# Patient Record
Sex: FEMALE | Race: WHITE | NOT HISPANIC OR LATINO | Employment: OTHER | URBAN - NONMETROPOLITAN AREA
[De-identification: names, ages, dates, MRNs, and addresses within clinical notes are randomized per-mention and may not be internally consistent; named-entity substitution may affect disease eponyms.]

---

## 2017-02-22 ENCOUNTER — TRANSCRIBE ORDERS (OUTPATIENT)
Dept: MAMMOGRAPHY | Facility: HOSPITAL | Age: 77
End: 2017-02-22

## 2017-02-22 DIAGNOSIS — Z13.9 SCREENING: Primary | ICD-10-CM

## 2017-03-16 ENCOUNTER — HOSPITAL ENCOUNTER (OUTPATIENT)
Dept: MAMMOGRAPHY | Facility: HOSPITAL | Age: 77
Discharge: HOME | End: 2017-03-16
Admitting: NURSE PRACTITIONER

## 2017-03-16 DIAGNOSIS — Z13.9 SCREENING: ICD-10-CM

## 2017-03-16 PROCEDURE — 77063 BREAST TOMOSYNTHESIS BI: CPT

## 2017-03-16 PROCEDURE — G0202 SCR MAMMO BI INCL CAD: HCPCS

## 2017-04-07 ENCOUNTER — OFFICE VISIT (OUTPATIENT)
Dept: PRIMARY CARE CLINIC | Age: 77
End: 2017-04-07
Payer: MEDICARE

## 2017-04-07 VITALS
HEIGHT: 66 IN | OXYGEN SATURATION: 97 % | SYSTOLIC BLOOD PRESSURE: 120 MMHG | BODY MASS INDEX: 35.93 KG/M2 | WEIGHT: 223.6 LBS | TEMPERATURE: 98.3 F | RESPIRATION RATE: 20 BRPM | DIASTOLIC BLOOD PRESSURE: 62 MMHG | HEART RATE: 76 BPM

## 2017-04-07 DIAGNOSIS — J06.9 UPPER RESPIRATORY TRACT INFECTION, UNSPECIFIED TYPE: Primary | ICD-10-CM

## 2017-04-07 PROCEDURE — 1036F TOBACCO NON-USER: CPT | Performed by: FAMILY MEDICINE

## 2017-04-07 PROCEDURE — 1123F ACP DISCUSS/DSCN MKR DOCD: CPT | Performed by: FAMILY MEDICINE

## 2017-04-07 PROCEDURE — 4040F PNEUMOC VAC/ADMIN/RCVD: CPT | Performed by: FAMILY MEDICINE

## 2017-04-07 PROCEDURE — 99213 OFFICE O/P EST LOW 20 MIN: CPT | Performed by: FAMILY MEDICINE

## 2017-04-07 PROCEDURE — 1090F PRES/ABSN URINE INCON ASSESS: CPT | Performed by: FAMILY MEDICINE

## 2017-04-07 PROCEDURE — G8417 CALC BMI ABV UP PARAM F/U: HCPCS | Performed by: FAMILY MEDICINE

## 2017-04-07 PROCEDURE — G8427 DOCREV CUR MEDS BY ELIG CLIN: HCPCS | Performed by: FAMILY MEDICINE

## 2017-04-07 PROCEDURE — G8400 PT W/DXA NO RESULTS DOC: HCPCS | Performed by: FAMILY MEDICINE

## 2017-04-07 RX ORDER — AZITHROMYCIN 250 MG/1
TABLET, FILM COATED ORAL
Qty: 1 PACKET | Refills: 0 | Status: SHIPPED | OUTPATIENT
Start: 2017-04-07 | End: 2018-05-17 | Stop reason: ALTCHOICE

## 2017-04-07 RX ORDER — BENZONATATE 200 MG/1
200 CAPSULE ORAL 3 TIMES DAILY PRN
Qty: 30 CAPSULE | Refills: 0 | Status: SHIPPED | OUTPATIENT
Start: 2017-04-07 | End: 2017-04-14

## 2017-04-07 ASSESSMENT — ENCOUNTER SYMPTOMS
CONSTIPATION: 0
SINUS PRESSURE: 1
VOMITING: 0
SHORTNESS OF BREATH: 0
EYE REDNESS: 0
EYE ITCHING: 0
DIARRHEA: 0
EYE PAIN: 1
WHEEZING: 0
COUGH: 1
SORE THROAT: 1
RHINORRHEA: 1
EYE DISCHARGE: 0
NAUSEA: 0
ABDOMINAL PAIN: 0

## 2017-05-04 ENCOUNTER — HOSPITAL ENCOUNTER (OUTPATIENT)
Dept: GENERAL RADIOLOGY | Age: 77
Discharge: HOME | End: 2017-05-04
Attending: FAMILY MEDICINE | Admitting: FAMILY MEDICINE

## 2017-05-04 DIAGNOSIS — R19.00 PELVIC MASS IN FEMALE: ICD-10-CM

## 2018-02-05 ENCOUNTER — TRANSCRIBE ORDERS (OUTPATIENT)
Dept: MAMMOGRAPHY | Facility: HOSPITAL | Age: 78
End: 2018-02-05

## 2018-02-05 DIAGNOSIS — Z12.39 BREAST CANCER SCREENING: Primary | ICD-10-CM

## 2018-02-14 RX ORDER — INSULIN GLARGINE 100 [IU]/ML
INJECTION, SOLUTION SUBCUTANEOUS
Status: ON HOLD | COMMUNITY
End: 2018-06-11

## 2018-02-14 RX ORDER — CARVEDILOL 25 MG/1
TABLET ORAL
COMMUNITY

## 2018-02-14 RX ORDER — MONTELUKAST SODIUM 10 MG/1
TABLET ORAL
COMMUNITY

## 2018-02-14 RX ORDER — AMLODIPINE BESYLATE 5 MG/1
5 TABLET ORAL DAILY
COMMUNITY

## 2018-02-14 RX ORDER — OXYCODONE AND ACETAMINOPHEN 10; 325 MG/1; MG/1
1 TABLET ORAL EVERY 6 HOURS PRN
COMMUNITY

## 2018-02-14 RX ORDER — LOSARTAN POTASSIUM 100 MG/1
TABLET ORAL
COMMUNITY

## 2018-02-14 RX ORDER — DOCUSATE SODIUM 100 MG/1
100 CAPSULE, LIQUID FILLED ORAL 2 TIMES DAILY
Status: ON HOLD | COMMUNITY
End: 2018-06-11

## 2018-02-14 RX ORDER — ROPINIROLE 2 MG/1
TABLET, FILM COATED ORAL
COMMUNITY

## 2018-02-14 RX ORDER — FUROSEMIDE 20 MG/1
20 TABLET ORAL 2 TIMES DAILY
Status: ON HOLD | COMMUNITY
End: 2018-06-11

## 2018-02-14 RX ORDER — PHENOL 1.4 %
600 AEROSOL, SPRAY (ML) MUCOUS MEMBRANE DAILY
COMMUNITY

## 2018-02-14 RX ORDER — LEVOTHYROXINE SODIUM 0.07 MG/1
TABLET ORAL
COMMUNITY

## 2018-02-14 RX ORDER — ASPIRIN 81 MG/1
TABLET, CHEWABLE ORAL
COMMUNITY

## 2018-02-15 ENCOUNTER — OFFICE VISIT (OUTPATIENT)
Dept: SURGERY | Facility: CLINIC | Age: 78
End: 2018-02-15

## 2018-02-15 VITALS
WEIGHT: 220 LBS | OXYGEN SATURATION: 98 % | HEIGHT: 64 IN | SYSTOLIC BLOOD PRESSURE: 120 MMHG | BODY MASS INDEX: 37.56 KG/M2 | DIASTOLIC BLOOD PRESSURE: 50 MMHG | TEMPERATURE: 98.1 F | HEART RATE: 67 BPM

## 2018-02-15 DIAGNOSIS — K59.04 CHRONIC IDIOPATHIC CONSTIPATION: Primary | ICD-10-CM

## 2018-02-15 DIAGNOSIS — Z12.11 SCREENING FOR COLON CANCER: ICD-10-CM

## 2018-02-15 DIAGNOSIS — N18.2 ANEMIA IN STAGE 2 CHRONIC KIDNEY DISEASE: ICD-10-CM

## 2018-02-15 DIAGNOSIS — D63.1 ANEMIA IN STAGE 2 CHRONIC KIDNEY DISEASE: ICD-10-CM

## 2018-02-15 PROCEDURE — 99203 OFFICE O/P NEW LOW 30 MIN: CPT | Performed by: SURGERY

## 2018-02-15 NOTE — PROGRESS NOTES
Patient: Dai Murphy    YOB: 1940    Date: 02/15/2018    Primary Care Provider: Buck Stern MD    Chief complaint:   Chief Complaint   Patient presents with   • Constipation     Chronic constipation        Subjective .     History of present illness:  I saw the patient in the office today as a consultation for evaluation and treatment of constipation and right flank pain. Constipation has been intermittent for the last several years.  Patient states she does have to take a laxative to have a bowel movement.  Patient denies any black or bloody bowel movements.  She also complains of RUQ pain and nausea after eating fatty/greasy food.  This is mild and only intermittent.  She no longer has her gallbladder.  Last normal colonoscopy was done 7 years ago at [facility] by myself. Patient denies a family history of colon cancer.      The following portions of the patient's history were reviewed and updated as appropriate: allergies, current medications, past family history, past medical history, past social history, past surgical history and problem list.      Review of Systems   Constitutional: Negative for chills, fever and unexpected weight change.   HENT: Negative for hearing loss, trouble swallowing and voice change.    Eyes: Negative for visual disturbance.   Respiratory: Positive for cough and shortness of breath. Negative for apnea, chest tightness and wheezing.    Cardiovascular: Negative for chest pain, palpitations and leg swelling.   Gastrointestinal: Positive for abdominal pain, constipation and nausea. Negative for abdominal distention, anal bleeding, blood in stool, diarrhea, rectal pain and vomiting.   Endocrine: Negative for cold intolerance and heat intolerance.   Genitourinary: Positive for flank pain. Negative for difficulty urinating and dysuria.   Musculoskeletal: Negative for back pain and gait problem.   Skin: Negative for color change, rash and wound.   Neurological:  Negative for dizziness, syncope, speech difficulty, weakness, light-headedness, numbness and headaches.   Hematological: Negative for adenopathy. Does not bruise/bleed easily.   Psychiatric/Behavioral: Negative for confusion. The patient is not nervous/anxious.        History:  Past Medical History:   Diagnosis Date   • CAD (coronary artery disease)    • CHF (congestive heart failure)    • Chronic kidney disease    • Diabetes mellitus    • GERD (gastroesophageal reflux disease)    • Hyperlipidemia    • Hypertension        Past Surgical History:   Procedure Laterality Date   • CHOLECYSTECTOMY     • OOPHORECTOMY         Family History   Problem Relation Age of Onset   • Heart disease Mother    • No Known Problems Father        Social History   Substance Use Topics   • Smoking status: Former Smoker     Quit date: 2/15/1968   • Smokeless tobacco: Never Used   • Alcohol use No       Allergies:  No Known Allergies    Medications:    Current Outpatient Prescriptions:   •  amLODIPine (NORVASC) 5 MG tablet, Take 5 mg by mouth Daily., Disp: , Rfl:   •  aspirin 81 MG chewable tablet, Take 81 mg by mouth daily., Disp: , Rfl:   •  calcium carbonate (OS-TONYA) 600 MG tablet, Take 600 mg by mouth Daily., Disp: , Rfl:   •  carvedilol (COREG) 25 MG tablet, Take 25 mg by mouth 2 times daily (with meals)., Disp: , Rfl:   •  docusate sodium (COLACE) 100 MG capsule, Take 100 mg by mouth 2 (Two) Times a Day., Disp: , Rfl:   •  fluticasone (VERAMYST) 27.5 MCG/SPRAY nasal spray, 2 sprays into each nostril Daily., Disp: , Rfl:   •  furosemide (LASIX) 20 MG tablet, Take 20 mg by mouth 2 (Two) Times a Day., Disp: , Rfl:   •  insulin glargine (LANTUS) 100 UNIT/ML injection, Inject 50 Units into the skin daily., Disp: , Rfl:   •  insulin lispro (humaLOG) 100 UNIT/ML injection, Inject  under the skin 3 (Three) Times a Day Before Meals., Disp: , Rfl:   •  levothyroxine (SYNTHROID, LEVOTHROID) 75 MCG tablet, Take 75 mcg by mouth Daily., Disp: , Rfl:    •  losartan (COZAAR) 100 MG tablet, Take 100 mg by mouth daily., Disp: , Rfl:   •  metFORMIN (GLUCOPHAGE) 500 MG tablet, Take 500 mg by mouth 2 times daily (with meals)., Disp: , Rfl:   •  montelukast (SINGULAIR) 10 MG tablet, Take 10 mg by mouth nightly., Disp: , Rfl:   •  oxyCODONE-acetaminophen (PERCOCET)  MG per tablet, Take 1 tablet by mouth Every 6 (Six) Hours As Needed for Moderate Pain ., Disp: , Rfl:   •  rOPINIRole (REQUIP) 2 MG tablet, Take 2 mg by mouth 2 times daily., Disp: , Rfl:     Objective     Vital Signs:   Temp:  [98.1 °F (36.7 °C)] 98.1 °F (36.7 °C)  Heart Rate:  [67] 67  BP: (120)/(50) 120/50    Physical Exam:   General Appearance:    Alert, cooperative, in no acute distress   Head:    Normocephalic, without obvious abnormality, atraumatic   Eyes:            Lids and lashes normal, conjunctivae and sclerae normal, no   icterus, no pallor, corneas clear, PERRL   Ears:    Ears appear intact with no abnormalities noted   Lungs:     Clear to auscultation,respirations regular, even and                  unlabored    Heart:    Regular rhythm and normal rate, normal S1 and S2, no            murmur   Abdomen:     no masses, no organomegaly, soft non-tender, non-distended, no guarding, there is no evidence of tenderness   Extremities:   Moves all extremities well, no edema, no cyanosis, no             redness   Pulses:   Pulses palpable and equal bilaterally   Skin:   No bleeding, bruising or rash   Neurologic:   Cranial nerves 2 - 12 grossly intact, sensation intact   Psychiatric: No evidence of depression or anxiety      Results Review:   I reviewed the patient's new clinical results.  Labs were reviewed    Review of Systems was reviewed and confirmed as accurate today.    Assessment/Plan :    1. Chronic idiopathic constipation : Stool softener seems to help.  Fiber supplement of choice daily might be of benefit as well.     2. Screening for colon cancer :Patient had a colonoscopy which  revealed diverticulosis 7 years ago.  She is not a candidate for screening at this time since she had no polyps and no polyp history.  No family history of colon cancer.      3. Anemia: Likely secondary to chronic disease and/or mild renal disease.  Does not appear to be microcytic.  I'll check an iron level.  I'll also check a stool guaiac.  If her iron is low or stool guaiac is positive I'll recommend a colonoscopy.    If there is an indication for colonoscopy such as iron deficiency or blood in her stool we'll proceed with a colonoscopy.  She understands this procedure as well as the risk of bleeding and perforation.    Electronically signed by Robert Foy MD  02/15/18 2:49 PM  Scribed for Robert Foy MD by Shweta Ornelas. 2/15/2018  3:45 PM

## 2018-02-20 ENCOUNTER — RESULTS ENCOUNTER (OUTPATIENT)
Dept: SURGERY | Facility: CLINIC | Age: 78
End: 2018-02-20

## 2018-02-20 DIAGNOSIS — D63.1 ANEMIA IN STAGE 2 CHRONIC KIDNEY DISEASE: ICD-10-CM

## 2018-02-20 DIAGNOSIS — N18.2 ANEMIA IN STAGE 2 CHRONIC KIDNEY DISEASE: ICD-10-CM

## 2018-03-22 ENCOUNTER — HOSPITAL ENCOUNTER (OUTPATIENT)
Dept: MAMMOGRAPHY | Facility: HOSPITAL | Age: 78
Discharge: HOME | End: 2018-03-22
Admitting: FAMILY MEDICINE

## 2018-03-22 DIAGNOSIS — Z12.39 BREAST CANCER SCREENING: ICD-10-CM

## 2018-03-22 PROCEDURE — 77063 BREAST TOMOSYNTHESIS BI: CPT

## 2018-03-22 PROCEDURE — 77067 SCR MAMMO BI INCL CAD: CPT

## 2018-03-23 ENCOUNTER — TRANSCRIBE ORDERS (OUTPATIENT)
Dept: ADMINISTRATIVE | Facility: HOSPITAL | Age: 78
End: 2018-03-23

## 2018-03-23 DIAGNOSIS — M54.41 ACUTE BACK PAIN WITH SCIATICA, RIGHT: Primary | ICD-10-CM

## 2018-03-26 ENCOUNTER — HOSPITAL ENCOUNTER (OUTPATIENT)
Dept: OTHER | Age: 78
Discharge: HOME | End: 2018-03-26

## 2018-03-26 LAB
A/G RATIO: 1.5 (ref 0.8–2)
ALBUMIN SERPL-MCNC: 4.2 G/DL (ref 3.4–4.8)
ALP BLD-CCNC: 101 U/L (ref 25–100)
ALT SERPL-CCNC: 16 U/L (ref 4–36)
ANION GAP SERPL CALCULATED.3IONS-SCNC: 13 MMOL/L (ref 3–16)
AST SERPL-CCNC: 15 U/L (ref 8–33)
BASOPHILS ABSOLUTE: 0 K/UL (ref 0–0.1)
BASOPHILS RELATIVE PERCENT: 0.4 %
BILIRUB SERPL-MCNC: 0.7 MG/DL (ref 0.3–1.2)
BUN BLDV-MCNC: 36 MG/DL (ref 6–20)
CALCIUM SERPL-MCNC: 9.5 MG/DL (ref 8.5–10.5)
CHLORIDE BLD-SCNC: 97 MMOL/L (ref 98–107)
CO2: 27 MMOL/L (ref 20–30)
CREAT SERPL-MCNC: 1.1 MG/DL (ref 0.4–1.2)
EOSINOPHILS ABSOLUTE: 0.1 K/UL (ref 0–0.4)
EOSINOPHILS RELATIVE PERCENT: 1.7 %
GFR AFRICAN AMERICAN: 58
GFR NON-AFRICAN AMERICAN: 48
GLOBULIN: 2.8 G/DL
GLUCOSE BLD-MCNC: 245 MG/DL (ref 74–106)
HCT VFR BLD CALC: 35.5 % (ref 37–47)
HEMOGLOBIN: 11.6 G/DL (ref 11.5–16.5)
IMMATURE GRANULOCYTES #: 0 K/UL
IMMATURE GRANULOCYTES %: 0.2 % (ref 0–5)
LYMPHOCYTES ABSOLUTE: 2 K/UL (ref 1.5–4)
LYMPHOCYTES RELATIVE PERCENT: 23.7 %
MCH RBC QN AUTO: 30.7 PG (ref 27–32)
MCHC RBC AUTO-ENTMCNC: 32.7 G/DL (ref 31–35)
MCV RBC AUTO: 93.9 FL (ref 80–100)
MONOCYTES ABSOLUTE: 0.6 K/UL (ref 0.2–0.8)
MONOCYTES RELATIVE PERCENT: 6.7 %
NEUTROPHILS ABSOLUTE: 5.7 K/UL (ref 2–7.5)
NEUTROPHILS RELATIVE PERCENT: 67.3 %
PDW BLD-RTO: 12.3 % (ref 11–16)
PLATELET # BLD: 232 K/UL (ref 150–400)
PMV BLD AUTO: 9.6 FL (ref 6–10)
POTASSIUM SERPL-SCNC: 5.1 MMOL/L (ref 3.4–5.1)
RBC # BLD: 3.78 M/UL (ref 3.8–5.8)
RHEUMATOID FACTOR: <10 IU/ML
SODIUM BLD-SCNC: 137 MMOL/L (ref 136–145)
TOTAL PROTEIN: 7 G/DL (ref 6.4–8.3)
WBC # BLD: 8.4 K/UL (ref 4–11)

## 2018-03-27 LAB
ANA INTERPRETATION: NORMAL
ANTI-NUCLEAR ANTIBODY (ANA): NEGATIVE
KAPPA, FREE LIGHT CHAINS, SERUM: 69.65 MG/L (ref 3.3–19.4)
KAPPA/LAMBDA RATIO: 2.59 (ref 0.26–1.65)
KAPPA/LAMBDA TEST COMMENT: ABNORMAL
LAMBDA, FREE LIGHT CHAINS, SERUM: 26.92 MG/L (ref 5.71–26.3)

## 2018-03-29 ENCOUNTER — HOSPITAL ENCOUNTER (OUTPATIENT)
Dept: GENERAL RADIOLOGY | Facility: HOSPITAL | Age: 78
Discharge: HOME | End: 2018-03-29

## 2018-03-29 ENCOUNTER — TRANSCRIBE ORDERS (OUTPATIENT)
Dept: GENERAL RADIOLOGY | Facility: HOSPITAL | Age: 78
End: 2018-03-29

## 2018-03-29 ENCOUNTER — HOSPITAL ENCOUNTER (OUTPATIENT)
Dept: MRI IMAGING | Facility: HOSPITAL | Age: 78
Discharge: HOME | End: 2018-03-29
Admitting: FAMILY MEDICINE

## 2018-03-29 DIAGNOSIS — R52 PAIN: ICD-10-CM

## 2018-03-29 DIAGNOSIS — R52 PAIN: Primary | ICD-10-CM

## 2018-03-29 DIAGNOSIS — M54.41 ACUTE BACK PAIN WITH SCIATICA, RIGHT: ICD-10-CM

## 2018-03-29 LAB — MISCELLANEOUS LAB TEST ORDER: ABNORMAL

## 2018-03-29 PROCEDURE — 72148 MRI LUMBAR SPINE W/O DYE: CPT

## 2018-03-29 PROCEDURE — 73130 X-RAY EXAM OF HAND: CPT

## 2018-03-29 PROCEDURE — 73610 X-RAY EXAM OF ANKLE: CPT

## 2018-03-29 PROCEDURE — 72100 X-RAY EXAM L-S SPINE 2/3 VWS: CPT

## 2018-03-29 PROCEDURE — 73562 X-RAY EXAM OF KNEE 3: CPT

## 2018-04-04 ENCOUNTER — TELEPHONE (OUTPATIENT)
Dept: SURGERY | Facility: CLINIC | Age: 78
End: 2018-04-04

## 2018-04-04 NOTE — TELEPHONE ENCOUNTER
I called the patient to see if they were going to have labs done, however there was answer so I left a message for her to call back.

## 2018-04-26 ENCOUNTER — HOSPITAL ENCOUNTER (OUTPATIENT)
Dept: PHYSICAL THERAPY | Age: 78
Discharge: HOME | End: 2018-04-30
Attending: ORTHOPAEDIC SURGERY | Admitting: ORTHOPAEDIC SURGERY

## 2018-05-01 ENCOUNTER — HOSPITAL ENCOUNTER (OUTPATIENT)
Dept: OTHER | Age: 78
Discharge: HOME | End: 2018-05-31
Attending: ORTHOPAEDIC SURGERY | Admitting: ORTHOPAEDIC SURGERY

## 2018-05-08 ENCOUNTER — TELEPHONE (OUTPATIENT)
Dept: SURGERY | Facility: CLINIC | Age: 78
End: 2018-05-08

## 2018-05-08 NOTE — TELEPHONE ENCOUNTER
Please let patient no that I ordered that labwork to further characterize her anemia make sure it was sent because her iron is low.  If her iron is low she would need a colonoscopy if not then we can avoid the colonoscopy.  I do recommend getting those labs.  She doesn't have to do I am at the hospital.  She can do them at [facility] if she wants to.

## 2018-05-17 ENCOUNTER — TRANSCRIBE ORDERS (OUTPATIENT)
Dept: ADMINISTRATIVE | Facility: HOSPITAL | Age: 78
End: 2018-05-17

## 2018-05-17 DIAGNOSIS — R42 DIZZINESS AND GIDDINESS: Primary | ICD-10-CM

## 2018-05-22 ENCOUNTER — HOSPITAL ENCOUNTER (OUTPATIENT)
Dept: ULTRASOUND IMAGING | Facility: HOSPITAL | Age: 78
Discharge: HOME | End: 2018-05-22
Admitting: NURSE PRACTITIONER

## 2018-05-22 DIAGNOSIS — R42 DIZZINESS AND GIDDINESS: ICD-10-CM

## 2018-05-22 PROCEDURE — 93880 EXTRACRANIAL BILAT STUDY: CPT

## 2018-05-23 NOTE — TELEPHONE ENCOUNTER
Letter given to Jessica so that it will be sent certified mail since we are unable to speak to the patient on the phone.

## 2018-06-07 ENCOUNTER — OFFICE VISIT (OUTPATIENT)
Dept: SURGERY | Facility: CLINIC | Age: 78
End: 2018-06-07

## 2018-06-07 VITALS
HEIGHT: 64 IN | HEART RATE: 69 BPM | TEMPERATURE: 97.9 F | OXYGEN SATURATION: 98 % | BODY MASS INDEX: 36.91 KG/M2 | WEIGHT: 216.2 LBS

## 2018-06-07 DIAGNOSIS — I65.23 CAROTID STENOSIS, ASYMPTOMATIC, BILATERAL: Primary | ICD-10-CM

## 2018-06-07 PROCEDURE — 99214 OFFICE O/P EST MOD 30 MIN: CPT | Performed by: SURGERY

## 2018-06-07 NOTE — PROGRESS NOTES
Patient: Dai Murphy    YOB: 1940    Date: 06/07/2018    Primary Care Provider: Buck Stern MD    Chief Complaint   Patient presents with   • Carotid Artery Disease       Subjective .     History of present illness:  The patient is in the office today for evaluation for carotid endarterectomy.  She complains of dizziness and tingling in her feet.  She denies any pain.  She had an ultrasound that shows greater than 70% on the right side and less than 50% on the left side. Patient denies TIA or stroke symptoms. No chest pain or shortness of breath. She was seen by cardiology and had stable findings and cleared for surgery. No family history of TIA or strokes.    The following portions of the patient's history were reviewed and updated as appropriate: allergies, current medications, past family history, past medical history, past social history, past surgical history and problem list.      Review of Systems   Constitutional: Negative for chills, fever and unexpected weight change.   HENT: Negative for hearing loss, trouble swallowing and voice change.    Eyes: Negative for visual disturbance.   Respiratory: Negative for apnea, cough, chest tightness, shortness of breath and wheezing.    Cardiovascular: Negative for chest pain, palpitations and leg swelling.   Gastrointestinal: Negative for abdominal distention, abdominal pain, anal bleeding, blood in stool, constipation, diarrhea, nausea, rectal pain and vomiting.   Endocrine: Negative for cold intolerance and heat intolerance.   Genitourinary: Negative for difficulty urinating, dysuria and flank pain.   Musculoskeletal: Negative for back pain and gait problem.   Skin: Negative for color change, rash and wound.   Neurological: Positive for dizziness, light-headedness and numbness. Negative for syncope, speech difficulty, weakness and headaches.   Hematological: Negative for adenopathy. Does not bruise/bleed easily.   Psychiatric/Behavioral:  "Negative for confusion. The patient is not nervous/anxious.        Allergies:  No Known Allergies    Medications:    Current Outpatient Prescriptions:   •  amLODIPine (NORVASC) 5 MG tablet, Take 5 mg by mouth Daily., Disp: , Rfl:   •  aspirin 81 MG chewable tablet, Take 81 mg by mouth daily., Disp: , Rfl:   •  calcium carbonate (OS-TONYA) 600 MG tablet, Take 600 mg by mouth Daily., Disp: , Rfl:   •  carvedilol (COREG) 25 MG tablet, Take 25 mg by mouth 2 times daily (with meals)., Disp: , Rfl:   •  docusate sodium (COLACE) 100 MG capsule, Take 100 mg by mouth 2 (Two) Times a Day., Disp: , Rfl:   •  fluticasone (VERAMYST) 27.5 MCG/SPRAY nasal spray, 2 sprays into each nostril Daily., Disp: , Rfl:   •  furosemide (LASIX) 20 MG tablet, Take 20 mg by mouth 2 (Two) Times a Day., Disp: , Rfl:   •  insulin glargine (LANTUS) 100 UNIT/ML injection, Inject 50 Units into the skin daily., Disp: , Rfl:   •  insulin lispro (humaLOG) 100 UNIT/ML injection, Inject  under the skin 3 (Three) Times a Day Before Meals., Disp: , Rfl:   •  levothyroxine (SYNTHROID, LEVOTHROID) 75 MCG tablet, Take 75 mcg by mouth Daily., Disp: , Rfl:   •  losartan (COZAAR) 100 MG tablet, Take 100 mg by mouth daily., Disp: , Rfl:   •  metFORMIN (GLUCOPHAGE) 500 MG tablet, Take 500 mg by mouth 2 times daily (with meals)., Disp: , Rfl:   •  montelukast (SINGULAIR) 10 MG tablet, Take 10 mg by mouth nightly., Disp: , Rfl:   •  oxyCODONE-acetaminophen (PERCOCET)  MG per tablet, Take 1 tablet by mouth Every 6 (Six) Hours As Needed for Moderate Pain ., Disp: , Rfl:   •  rOPINIRole (REQUIP) 2 MG tablet, Take 2 mg by mouth 2 times daily., Disp: , Rfl:     History\"  Past Medical History:   Diagnosis Date   • CAD (coronary artery disease)    • CHF (congestive heart failure)    • Chronic kidney disease    • Diabetes mellitus    • GERD (gastroesophageal reflux disease)    • Hyperlipidemia    • Hypertension        Past Surgical History:   Procedure Laterality Date " "  • CHOLECYSTECTOMY     • OOPHORECTOMY         Family History   Problem Relation Age of Onset   • Heart disease Mother    • No Known Problems Father        Social History   Substance Use Topics   • Smoking status: Former Smoker     Quit date: 2/15/1968   • Smokeless tobacco: Never Used   • Alcohol use No        Objective     Vital Signs:   Vitals:    06/07/18 1032   Pulse: 69   Temp: 97.9 °F (36.6 °C)   TempSrc: Temporal Artery    SpO2: 98%   Weight: 98.1 kg (216 lb 3.2 oz)   Height: 162.6 cm (64.02\")       Physical Exam:   General Appearance:    Alert, cooperative, in no acute distress   Head:    Normocephalic, without obvious abnormality, atraumatic   Eyes:            Lids and lashes normal, conjunctivae and sclerae normal, no   icterus, no pallor, corneas clear, PERRLA   Ears:    Ears appear intact with no abnormalities noted   Throat:   No oral lesions, no thrush, oral mucosa moist   Neck:   No adenopathy, supple, trachea midline, no thyromegaly, Bilateral  carotid bruits, no JVD   Lungs:     Clear to auscultation,respirations regular, even and                  unlabored    Heart:    Regular rhythm and normal rate, normal S1 and S2, no            murmur, no gallop, no rub, no click   Chest Wall:    No abnormalities observed   Abdomen:     Normal bowel sounds, no masses, no organomegaly, soft        non-tender, non-distended, no guarding, no rebound                tenderness   Extremities:   Moves all extremities well, no edema, no cyanosis, no             redness   Pulses:   Pulses palpable and equal bilaterally   Skin:   No bleeding, bruising or rash   Lymph nodes:   No palpable adenopathy   Neurologic:   Cranial nerves 2 - 12 grossly intact, sensation intact, DTR       present and equal bilaterally     Results Review:   I reviewed the patient's new clinical results.    Assessment/Plan     1. Carotid stenosis, asymptomatic, bilateral        Discussion with patient, recommend right carotid endarterectomy for " high-grade stenosis. Risks of bleeding, infection and 2% chance of perioperative CVA were discussed and patient agreeable. She also understands she'll be admitted in ICU for overnight observation. Patient understands and is agreeable, no further questions per patient.    I discussed the patients findings and my recommendations with patient    Review of Systems was reviewed and confirmed as accurate today.    Electronically signed by Nate Matthew MD  06/07/18      Portions of this note have been scribed for Nate Matthew MD by Michelle Milligan. 6/7/2018  10:49 AM

## 2018-06-08 ENCOUNTER — TRANSCRIBE ORDERS (OUTPATIENT)
Dept: ADMINISTRATIVE | Facility: HOSPITAL | Age: 78
End: 2018-06-08

## 2018-06-08 ENCOUNTER — HOSPITAL ENCOUNTER (OUTPATIENT)
Dept: GENERAL RADIOLOGY | Facility: HOSPITAL | Age: 78
Discharge: HOME | End: 2018-06-08
Admitting: SURGERY

## 2018-06-08 ENCOUNTER — APPOINTMENT (OUTPATIENT)
Dept: PREADMISSION TESTING | Facility: HOSPITAL | Age: 78
End: 2018-06-08

## 2018-06-08 VITALS — BODY MASS INDEX: 36.88 KG/M2 | WEIGHT: 216 LBS | HEIGHT: 64 IN

## 2018-06-08 DIAGNOSIS — I25.10 CAD IN NATIVE ARTERY: Primary | ICD-10-CM

## 2018-06-08 PROBLEM — I65.23 CAROTID STENOSIS, ASYMPTOMATIC, BILATERAL: Status: ACTIVE | Noted: 2018-06-08

## 2018-06-08 LAB
ANION GAP SERPL CALCULATED.3IONS-SCNC: 16 MMOL/L (ref 10–20)
BUN BLD-MCNC: 31 MG/DL (ref 7–20)
BUN/CREAT SERPL: 31 (ref 7.1–23.5)
CALCIUM SPEC-SCNC: 9.3 MG/DL (ref 8.4–10.2)
CHLORIDE SERPL-SCNC: 102 MMOL/L (ref 98–107)
CO2 SERPL-SCNC: 28 MMOL/L (ref 26–30)
CREAT BLD-MCNC: 1 MG/DL (ref 0.6–1.3)
DEPRECATED RDW RBC AUTO: 42.8 FL (ref 37–54)
ERYTHROCYTE [DISTWIDTH] IN BLOOD BY AUTOMATED COUNT: 12.6 % (ref 11.5–14.5)
GFR SERPL CREATININE-BSD FRML MDRD: 54 ML/MIN/1.73
GLUCOSE BLD-MCNC: 271 MG/DL (ref 74–98)
HCT VFR BLD AUTO: 32.9 % (ref 37–47)
HGB BLD-MCNC: 11 G/DL (ref 12–16)
MCH RBC QN AUTO: 31.3 PG (ref 27–31)
MCHC RBC AUTO-ENTMCNC: 33.4 G/DL (ref 30–37)
MCV RBC AUTO: 93.5 FL (ref 81–99)
PLATELET # BLD AUTO: 245 10*3/MM3 (ref 130–400)
PMV BLD AUTO: 10.2 FL (ref 6–12)
POTASSIUM BLD-SCNC: 5 MMOL/L (ref 3.5–5.1)
RBC # BLD AUTO: 3.52 10*6/MM3 (ref 4.2–5.4)
SODIUM BLD-SCNC: 141 MMOL/L (ref 137–145)
WBC NRBC COR # BLD: 7.56 10*3/MM3 (ref 4.8–10.8)

## 2018-06-08 PROCEDURE — 80048 BASIC METABOLIC PNL TOTAL CA: CPT | Performed by: SURGERY

## 2018-06-08 PROCEDURE — 71045 X-RAY EXAM CHEST 1 VIEW: CPT

## 2018-06-08 PROCEDURE — 85027 COMPLETE CBC AUTOMATED: CPT | Performed by: SURGERY

## 2018-06-08 PROCEDURE — 36415 COLL VENOUS BLD VENIPUNCTURE: CPT

## 2018-06-08 NOTE — DISCHARGE INSTRUCTIONS
Patient instructed on PAT PASS information.  Patient/family member verbalized understanding of instruction/education.Patient to apply Chlorhexadine wipes  to surgical area (as instructed) the night before procedure and the AM of procedure. Wipes provided.Chlorhexidine wipes given to patient with verification sheet. Patient/Family member verbalized understanding to all teaching and instruction.PAT PASS GIVEN/REVIEWED WITH PT.  VERBALIZED UNDERSTANDING OF THE FOLLOWING:  DO NOT EAT, DRINK, SMOKE, USE SMOKELESS TOBACCO OR CHEW GUM AFTER MIDNIGHT THE NIGHT BEFORE SURGERY.  THIS ALSO INCLUDES HARD CANDIES AND MINTS.    DO NOT SHAVE THE AREA TO BE OPERATED ON AT LEAST 48 HOURS PRIOR TO THE PROCEDURE.  DO NOT WEAR MAKE UP OR NAIL POLISH.  DO NOT LEAVE IN ANY PIERCING OR WEAR JEWELRY THE DAY OF SURGERY.      DO NOT USE ADHESIVES IF YOU WEAR DENTURES.    DO NOT WEAR EYE CONTACTS; BRING IN YOUR GLASSES.    ONLY TAKE MEDICATION THE MORNING OF YOUR PROCEDURE IF INSTRUCTED BY YOUR SURGEON WITH ENOUGH WATER TO SWALLOW THE MEDICATION.  IF YOUR SURGEON DID NOT SPECIFY WHICH MEDICATIONS TO TAKE, YOU WILL NEED TO CALL THEIR OFFICE FOR FURTHER INSTRUCTIONS AND DO AS THEY INSTRUCT.    LEAVE ANYTHING YOU CONSIDER VALUABLE AT HOME.    YOU WILL NEED TO ARRANGE FOR SOMEONE TO DRIVE YOU HOME AFTER SURGERY.  IT IS RECOMMENDED THAT YOU DO NOT DRIVE, WORK, DRINK ALCOHOL OR MAKE MAJOR DECISIONS FOR AT LEAST 24 HOURS AFTER YOUR PROCEDURE IS COMPLETE.      THE DAY OF YOUR PROCEDURE, BRING IN THE FOLLOWING IF APPLICABLE:   PICTURE ID AND INSURANCE/MEDICARE OR MEDICAID CARDS   COPY OF ADVANCED DIRECTIVE/LIVING WILL/POWER OR    CPAP/BIPAP/INHALERS   SKIN PREP SHEET   YOUR PREADMISSION TESTING PASS (IF NOT A PHONE HISTORY)

## 2018-06-11 ENCOUNTER — HOSPITAL ENCOUNTER (OUTPATIENT)
Facility: HOSPITAL | Age: 78
Discharge: HOME | End: 2018-06-12
Attending: INTERNAL MEDICINE | Admitting: INTERNAL MEDICINE

## 2018-06-11 DIAGNOSIS — I25.10 CAD IN NATIVE ARTERY: Primary | ICD-10-CM

## 2018-06-11 LAB
ALBUMIN SERPL-MCNC: 4.2 G/DL (ref 3.5–5)
ALBUMIN/GLOB SERPL: 1.4 G/DL (ref 1–2)
ALP SERPL-CCNC: 115 U/L (ref 38–126)
ALT SERPL W P-5'-P-CCNC: 25 U/L (ref 13–69)
ANION GAP SERPL CALCULATED.3IONS-SCNC: 14.5 MMOL/L (ref 10–20)
AST SERPL-CCNC: 22 U/L (ref 15–46)
BILIRUB SERPL-MCNC: 0.8 MG/DL (ref 0.2–1.3)
BUN BLD-MCNC: 29 MG/DL (ref 7–20)
BUN/CREAT SERPL: 29 (ref 7.1–23.5)
CALCIUM SPEC-SCNC: 9.4 MG/DL (ref 8.4–10.2)
CHLORIDE SERPL-SCNC: 104 MMOL/L (ref 98–107)
CO2 SERPL-SCNC: 28 MMOL/L (ref 26–30)
CREAT BLD-MCNC: 1 MG/DL (ref 0.6–1.3)
DEPRECATED RDW RBC AUTO: 42 FL (ref 37–54)
DEPRECATED RDW RBC AUTO: 42.8 FL (ref 37–54)
ERYTHROCYTE [DISTWIDTH] IN BLOOD BY AUTOMATED COUNT: 12.3 % (ref 11.5–14.5)
ERYTHROCYTE [DISTWIDTH] IN BLOOD BY AUTOMATED COUNT: 12.5 % (ref 11.5–14.5)
GFR SERPL CREATININE-BSD FRML MDRD: 54 ML/MIN/1.73
GLOBULIN UR ELPH-MCNC: 3 GM/DL
GLUCOSE BLD-MCNC: 175 MG/DL (ref 74–98)
GLUCOSE BLDC GLUCOMTR-MCNC: 190 MG/DL (ref 70–130)
GLUCOSE BLDC GLUCOMTR-MCNC: 238 MG/DL (ref 70–130)
HCT VFR BLD AUTO: 31.5 % (ref 37–47)
HCT VFR BLD AUTO: 34.3 % (ref 37–47)
HGB BLD-MCNC: 10.4 G/DL (ref 12–16)
HGB BLD-MCNC: 11.7 G/DL (ref 12–16)
MCH RBC QN AUTO: 30.7 PG (ref 27–31)
MCH RBC QN AUTO: 31.7 PG (ref 27–31)
MCHC RBC AUTO-ENTMCNC: 33 G/DL (ref 30–37)
MCHC RBC AUTO-ENTMCNC: 34.1 G/DL (ref 30–37)
MCV RBC AUTO: 92.9 FL (ref 81–99)
MCV RBC AUTO: 93 FL (ref 81–99)
PLATELET # BLD AUTO: 196 10*3/MM3 (ref 130–400)
PLATELET # BLD AUTO: 223 10*3/MM3 (ref 130–400)
PMV BLD AUTO: 9.7 FL (ref 6–12)
PMV BLD AUTO: 9.9 FL (ref 6–12)
POTASSIUM BLD-SCNC: 4.5 MMOL/L (ref 3.5–5.1)
PROT SERPL-MCNC: 7.2 G/DL (ref 6.3–8.2)
RBC # BLD AUTO: 3.39 10*6/MM3 (ref 4.2–5.4)
RBC # BLD AUTO: 3.69 10*6/MM3 (ref 4.2–5.4)
SODIUM BLD-SCNC: 142 MMOL/L (ref 137–145)
WBC NRBC COR # BLD: 10.1 10*3/MM3 (ref 4.8–10.8)
WBC NRBC COR # BLD: 8.17 10*3/MM3 (ref 4.8–10.8)

## 2018-06-11 PROCEDURE — 85027 COMPLETE CBC AUTOMATED: CPT | Performed by: INTERNAL MEDICINE

## 2018-06-11 PROCEDURE — A9270 NON-COVERED ITEM OR SERVICE: HCPCS | Performed by: INTERNAL MEDICINE

## 2018-06-11 PROCEDURE — 63710000001 AMLODIPINE 5 MG TABLET: Performed by: INTERNAL MEDICINE

## 2018-06-11 PROCEDURE — C1894 INTRO/SHEATH, NON-LASER: HCPCS | Performed by: INTERNAL MEDICINE

## 2018-06-11 PROCEDURE — 82962 GLUCOSE BLOOD TEST: CPT

## 2018-06-11 PROCEDURE — C1874 STENT, COATED/COV W/DEL SYS: HCPCS | Performed by: INTERNAL MEDICINE

## 2018-06-11 PROCEDURE — C1887 CATHETER, GUIDING: HCPCS | Performed by: INTERNAL MEDICINE

## 2018-06-11 PROCEDURE — 63710000001 ATORVASTATIN 40 MG TABLET: Performed by: INTERNAL MEDICINE

## 2018-06-11 PROCEDURE — C1769 GUIDE WIRE: HCPCS | Performed by: INTERNAL MEDICINE

## 2018-06-11 PROCEDURE — 63710000001 INSULIN DETEMIR PER 5 UNITS: Performed by: INTERNAL MEDICINE

## 2018-06-11 PROCEDURE — 93005 ELECTROCARDIOGRAM TRACING: CPT | Performed by: INTERNAL MEDICINE

## 2018-06-11 PROCEDURE — 63710000001 ASPIRIN 81 MG CHEWABLE TABLET: Performed by: INTERNAL MEDICINE

## 2018-06-11 PROCEDURE — G0378 HOSPITAL OBSERVATION PER HR: HCPCS

## 2018-06-11 PROCEDURE — 63710000001 INSULIN ASPART PER 5 UNITS: Performed by: INTERNAL MEDICINE

## 2018-06-11 PROCEDURE — 25010000002 MIDAZOLAM PER 1 MG: Performed by: INTERNAL MEDICINE

## 2018-06-11 PROCEDURE — 63710000001 ROPINIROLE 1 MG TABLET: Performed by: INTERNAL MEDICINE

## 2018-06-11 PROCEDURE — C1760 CLOSURE DEV, VASC: HCPCS | Performed by: INTERNAL MEDICINE

## 2018-06-11 PROCEDURE — 25010000002 FENTANYL CITRATE (PF) 100 MCG/2ML SOLUTION: Performed by: INTERNAL MEDICINE

## 2018-06-11 PROCEDURE — 93572 IV DOP VEL&/PRESS C FLO EA: CPT | Performed by: INTERNAL MEDICINE

## 2018-06-11 PROCEDURE — 63710000001 MONTELUKAST 10 MG TABLET: Performed by: INTERNAL MEDICINE

## 2018-06-11 PROCEDURE — 25010000002 ADENOSINE (DIAGNOSTIC) PER 30 MG: Performed by: INTERNAL MEDICINE

## 2018-06-11 PROCEDURE — 63710000001 LOSARTAN 50 MG TABLET: Performed by: INTERNAL MEDICINE

## 2018-06-11 PROCEDURE — 80053 COMPREHEN METABOLIC PANEL: CPT | Performed by: INTERNAL MEDICINE

## 2018-06-11 PROCEDURE — 63710000001 CARVEDILOL 25 MG TABLET: Performed by: INTERNAL MEDICINE

## 2018-06-11 PROCEDURE — C9600 PERC DRUG-EL COR STENT SING: HCPCS | Performed by: INTERNAL MEDICINE

## 2018-06-11 PROCEDURE — 0 IOPAMIDOL PER 1 ML: Performed by: INTERNAL MEDICINE

## 2018-06-11 PROCEDURE — 25010000002 BIVALIRUDIN TRIFLUOROACETATE 250 MG RECONSTITUTED SOLUTION 1 EACH VIAL: Performed by: INTERNAL MEDICINE

## 2018-06-11 PROCEDURE — 63710000001 OXYCODONE-ACETAMINOPHEN 10-325 MG TABLET: Performed by: INTERNAL MEDICINE

## 2018-06-11 PROCEDURE — 93458 L HRT ARTERY/VENTRICLE ANGIO: CPT | Performed by: INTERNAL MEDICINE

## 2018-06-11 PROCEDURE — 93571 IV DOP VEL&/PRESS C FLO 1ST: CPT | Performed by: INTERNAL MEDICINE

## 2018-06-11 PROCEDURE — 25010000002 HEPARIN (PORCINE) PER 1000 UNITS: Performed by: INTERNAL MEDICINE

## 2018-06-11 PROCEDURE — C1725 CATH, TRANSLUMIN NON-LASER: HCPCS | Performed by: INTERNAL MEDICINE

## 2018-06-11 DEVICE — XIENCE ALPINE EVEROLIMUS ELUTING CORONARY STENT SYSTEM 3.50 MM X 38 MM / RAPID-EXCHANGE
Type: IMPLANTABLE DEVICE | Status: FUNCTIONAL
Brand: XIENCE ALPINE

## 2018-06-11 DEVICE — XIENCE ALPINE EVEROLIMUS ELUTING CORONARY STENT SYSTEM 2.75 MM X 33 MM / RAPID-EXCHANGE
Type: IMPLANTABLE DEVICE | Status: FUNCTIONAL
Brand: XIENCE ALPINE

## 2018-06-11 DEVICE — XIENCE ALPINE EVEROLIMUS ELUTING CORONARY STENT SYSTEM 3.00 MM X 15 MM / RAPID-EXCHANGE
Type: IMPLANTABLE DEVICE | Status: FUNCTIONAL
Brand: XIENCE ALPINE

## 2018-06-11 RX ORDER — ASPIRIN 325 MG
TABLET ORAL AS NEEDED
Status: DISCONTINUED | OUTPATIENT
Start: 2018-06-11 | End: 2018-06-11 | Stop reason: HOSPADM

## 2018-06-11 RX ORDER — CLOPIDOGREL BISULFATE 75 MG/1
TABLET ORAL AS NEEDED
Status: DISCONTINUED | OUTPATIENT
Start: 2018-06-11 | End: 2018-06-11 | Stop reason: HOSPADM

## 2018-06-11 RX ORDER — CLOPIDOGREL BISULFATE 75 MG/1
75 TABLET ORAL DAILY
Status: DISCONTINUED | OUTPATIENT
Start: 2018-06-12 | End: 2018-06-12 | Stop reason: HOSPADM

## 2018-06-11 RX ORDER — LIDOCAINE HYDROCHLORIDE 10 MG/ML
INJECTION, SOLUTION INFILTRATION; PERINEURAL AS NEEDED
Status: DISCONTINUED | OUTPATIENT
Start: 2018-06-11 | End: 2018-06-11 | Stop reason: HOSPADM

## 2018-06-11 RX ORDER — AMLODIPINE BESYLATE 5 MG/1
5 TABLET ORAL DAILY
Status: DISCONTINUED | OUTPATIENT
Start: 2018-06-11 | End: 2018-06-12 | Stop reason: HOSPADM

## 2018-06-11 RX ORDER — FENTANYL CITRATE 50 UG/ML
INJECTION, SOLUTION INTRAMUSCULAR; INTRAVENOUS AS NEEDED
Status: DISCONTINUED | OUTPATIENT
Start: 2018-06-11 | End: 2018-06-11 | Stop reason: HOSPADM

## 2018-06-11 RX ORDER — CARVEDILOL 25 MG/1
25 TABLET ORAL 2 TIMES DAILY WITH MEALS
Status: DISCONTINUED | OUTPATIENT
Start: 2018-06-11 | End: 2018-06-12 | Stop reason: HOSPADM

## 2018-06-11 RX ORDER — SODIUM CHLORIDE 9 MG/ML
100 INJECTION, SOLUTION INTRAVENOUS CONTINUOUS
Status: DISCONTINUED | OUTPATIENT
Start: 2018-06-11 | End: 2018-06-12 | Stop reason: HOSPADM

## 2018-06-11 RX ORDER — MONTELUKAST SODIUM 10 MG/1
10 TABLET ORAL DAILY
Status: DISCONTINUED | OUTPATIENT
Start: 2018-06-11 | End: 2018-06-12 | Stop reason: HOSPADM

## 2018-06-11 RX ORDER — LEVOTHYROXINE SODIUM 0.07 MG/1
75 TABLET ORAL
Status: DISCONTINUED | OUTPATIENT
Start: 2018-06-12 | End: 2018-06-12 | Stop reason: HOSPADM

## 2018-06-11 RX ORDER — OXYCODONE AND ACETAMINOPHEN 10; 325 MG/1; MG/1
1 TABLET ORAL EVERY 6 HOURS PRN
Status: DISCONTINUED | OUTPATIENT
Start: 2018-06-11 | End: 2018-06-12 | Stop reason: HOSPADM

## 2018-06-11 RX ORDER — ALPRAZOLAM 0.5 MG/1
0.5 TABLET ORAL 3 TIMES DAILY PRN
Status: DISCONTINUED | OUTPATIENT
Start: 2018-06-11 | End: 2018-06-12 | Stop reason: HOSPADM

## 2018-06-11 RX ORDER — LOSARTAN POTASSIUM 50 MG/1
100 TABLET ORAL
Status: DISCONTINUED | OUTPATIENT
Start: 2018-06-11 | End: 2018-06-12 | Stop reason: HOSPADM

## 2018-06-11 RX ORDER — MIDAZOLAM HYDROCHLORIDE 1 MG/ML
INJECTION INTRAMUSCULAR; INTRAVENOUS AS NEEDED
Status: DISCONTINUED | OUTPATIENT
Start: 2018-06-11 | End: 2018-06-11 | Stop reason: HOSPADM

## 2018-06-11 RX ORDER — NICOTINE POLACRILEX 4 MG
1 LOZENGE BUCCAL
Status: DISCONTINUED | OUTPATIENT
Start: 2018-06-11 | End: 2018-06-12 | Stop reason: HOSPADM

## 2018-06-11 RX ORDER — DEXTROSE MONOHYDRATE 25 G/50ML
25 INJECTION, SOLUTION INTRAVENOUS
Status: DISCONTINUED | OUTPATIENT
Start: 2018-06-11 | End: 2018-06-12 | Stop reason: HOSPADM

## 2018-06-11 RX ORDER — ROPINIROLE 1 MG/1
2 TABLET, FILM COATED ORAL NIGHTLY
Status: DISCONTINUED | OUTPATIENT
Start: 2018-06-11 | End: 2018-06-12 | Stop reason: HOSPADM

## 2018-06-11 RX ORDER — ASPIRIN 81 MG/1
81 TABLET, CHEWABLE ORAL DAILY
Status: DISCONTINUED | OUTPATIENT
Start: 2018-06-11 | End: 2018-06-12 | Stop reason: HOSPADM

## 2018-06-11 RX ORDER — ATORVASTATIN CALCIUM 40 MG/1
80 TABLET, FILM COATED ORAL NIGHTLY
Status: DISCONTINUED | OUTPATIENT
Start: 2018-06-11 | End: 2018-06-12 | Stop reason: HOSPADM

## 2018-06-11 RX ADMIN — AMLODIPINE BESYLATE 5 MG: 5 TABLET ORAL at 16:43

## 2018-06-11 RX ADMIN — MONTELUKAST SODIUM 10 MG: 10 TABLET, FILM COATED ORAL at 16:42

## 2018-06-11 RX ADMIN — ASPIRIN 81 MG 81 MG: 81 TABLET ORAL at 16:42

## 2018-06-11 RX ADMIN — LOSARTAN POTASSIUM 100 MG: 50 TABLET, FILM COATED ORAL at 16:42

## 2018-06-11 RX ADMIN — INSULIN ASPART 4 UNITS: 100 INJECTION, SOLUTION INTRAVENOUS; SUBCUTANEOUS at 16:42

## 2018-06-11 RX ADMIN — OXYCODONE HYDROCHLORIDE AND ACETAMINOPHEN 1 TABLET: 10; 325 TABLET ORAL at 22:02

## 2018-06-11 RX ADMIN — INSULIN ASPART 2 UNITS: 100 INJECTION, SOLUTION INTRAVENOUS; SUBCUTANEOUS at 22:00

## 2018-06-11 RX ADMIN — INSULIN DETEMIR 50 UNITS: 100 INJECTION, SOLUTION SUBCUTANEOUS at 22:00

## 2018-06-11 RX ADMIN — ROPINIROLE HYDROCHLORIDE 2 MG: 1 TABLET, FILM COATED ORAL at 21:56

## 2018-06-11 RX ADMIN — CARVEDILOL 25 MG: 25 TABLET, FILM COATED ORAL at 17:46

## 2018-06-11 RX ADMIN — ATORVASTATIN CALCIUM 80 MG: 40 TABLET, FILM COATED ORAL at 21:56

## 2018-06-12 ENCOUNTER — TELEPHONE (OUTPATIENT)
Dept: SURGERY | Facility: CLINIC | Age: 78
End: 2018-06-12

## 2018-06-12 VITALS
SYSTOLIC BLOOD PRESSURE: 133 MMHG | BODY MASS INDEX: 36.27 KG/M2 | DIASTOLIC BLOOD PRESSURE: 57 MMHG | OXYGEN SATURATION: 98 % | HEART RATE: 63 BPM | RESPIRATION RATE: 18 BRPM | TEMPERATURE: 97.9 F | WEIGHT: 212.44 LBS | HEIGHT: 64 IN

## 2018-06-12 LAB
ANION GAP SERPL CALCULATED.3IONS-SCNC: 9.9 MMOL/L (ref 10–20)
BUN BLD-MCNC: 24 MG/DL (ref 7–20)
BUN/CREAT SERPL: 30 (ref 7.1–23.5)
CALCIUM SPEC-SCNC: 8.9 MG/DL (ref 8.4–10.2)
CHLORIDE SERPL-SCNC: 105 MMOL/L (ref 98–107)
CO2 SERPL-SCNC: 30 MMOL/L (ref 26–30)
CREAT BLD-MCNC: 0.8 MG/DL (ref 0.6–1.3)
DEPRECATED RDW RBC AUTO: 41 FL (ref 37–54)
ERYTHROCYTE [DISTWIDTH] IN BLOOD BY AUTOMATED COUNT: 12.3 % (ref 11.5–14.5)
GFR SERPL CREATININE-BSD FRML MDRD: 69 ML/MIN/1.73
GLUCOSE BLD-MCNC: 110 MG/DL (ref 74–98)
GLUCOSE BLDC GLUCOMTR-MCNC: 140 MG/DL (ref 70–130)
GLUCOSE BLDC GLUCOMTR-MCNC: 188 MG/DL (ref 70–130)
HCT VFR BLD AUTO: 29.8 % (ref 37–47)
HGB BLD-MCNC: 10.2 G/DL (ref 12–16)
MCH RBC QN AUTO: 31.6 PG (ref 27–31)
MCHC RBC AUTO-ENTMCNC: 34.2 G/DL (ref 30–37)
MCV RBC AUTO: 92.3 FL (ref 81–99)
PLATELET # BLD AUTO: 174 10*3/MM3 (ref 130–400)
PMV BLD AUTO: 9.6 FL (ref 6–12)
POTASSIUM BLD-SCNC: 3.9 MMOL/L (ref 3.5–5.1)
RBC # BLD AUTO: 3.23 10*6/MM3 (ref 4.2–5.4)
SODIUM BLD-SCNC: 141 MMOL/L (ref 137–145)
TROPONIN I SERPL-MCNC: 0.25 NG/ML (ref 0–0.03)
WBC NRBC COR # BLD: 8.71 10*3/MM3 (ref 4.8–10.8)

## 2018-06-12 PROCEDURE — A9270 NON-COVERED ITEM OR SERVICE: HCPCS | Performed by: INTERNAL MEDICINE

## 2018-06-12 PROCEDURE — 63710000001 CLOPIDOGREL 75 MG TABLET: Performed by: INTERNAL MEDICINE

## 2018-06-12 PROCEDURE — 84484 ASSAY OF TROPONIN QUANT: CPT | Performed by: INTERNAL MEDICINE

## 2018-06-12 PROCEDURE — 82962 GLUCOSE BLOOD TEST: CPT

## 2018-06-12 PROCEDURE — 85027 COMPLETE CBC AUTOMATED: CPT | Performed by: INTERNAL MEDICINE

## 2018-06-12 PROCEDURE — 63710000001 CARVEDILOL 25 MG TABLET: Performed by: INTERNAL MEDICINE

## 2018-06-12 PROCEDURE — 63710000001 AMLODIPINE 5 MG TABLET: Performed by: INTERNAL MEDICINE

## 2018-06-12 PROCEDURE — 63710000001 LEVOTHYROXINE 75 MCG TABLET: Performed by: INTERNAL MEDICINE

## 2018-06-12 PROCEDURE — 63710000001 LOSARTAN 50 MG TABLET: Performed by: INTERNAL MEDICINE

## 2018-06-12 PROCEDURE — G0378 HOSPITAL OBSERVATION PER HR: HCPCS

## 2018-06-12 PROCEDURE — 80048 BASIC METABOLIC PNL TOTAL CA: CPT | Performed by: INTERNAL MEDICINE

## 2018-06-12 PROCEDURE — 63710000001 ASPIRIN 81 MG CHEWABLE TABLET: Performed by: INTERNAL MEDICINE

## 2018-06-12 PROCEDURE — 63710000001 MONTELUKAST 10 MG TABLET: Performed by: INTERNAL MEDICINE

## 2018-06-12 PROCEDURE — 93005 ELECTROCARDIOGRAM TRACING: CPT | Performed by: INTERNAL MEDICINE

## 2018-06-12 RX ORDER — ATORVASTATIN CALCIUM 80 MG/1
80 TABLET, FILM COATED ORAL NIGHTLY
Qty: 90 TABLET | Refills: 3 | Status: SHIPPED | OUTPATIENT
Start: 2018-06-12

## 2018-06-12 RX ORDER — CLOPIDOGREL BISULFATE 75 MG/1
75 TABLET ORAL DAILY
Qty: 90 TABLET | Refills: 3 | Status: SHIPPED | OUTPATIENT
Start: 2018-06-12

## 2018-06-12 RX ADMIN — ASPIRIN 81 MG 81 MG: 81 TABLET ORAL at 09:18

## 2018-06-12 RX ADMIN — MONTELUKAST SODIUM 10 MG: 10 TABLET, FILM COATED ORAL at 09:19

## 2018-06-12 RX ADMIN — CLOPIDOGREL BISULFATE 75 MG: 75 TABLET ORAL at 09:18

## 2018-06-12 RX ADMIN — LOSARTAN POTASSIUM 100 MG: 50 TABLET, FILM COATED ORAL at 09:18

## 2018-06-12 RX ADMIN — LEVOTHYROXINE SODIUM 75 MCG: 75 TABLET ORAL at 06:07

## 2018-06-12 RX ADMIN — SODIUM CHLORIDE 100 ML/HR: 9 INJECTION, SOLUTION INTRAVENOUS at 00:32

## 2018-06-12 RX ADMIN — CARVEDILOL 25 MG: 25 TABLET, FILM COATED ORAL at 09:18

## 2018-06-12 RX ADMIN — AMLODIPINE BESYLATE 5 MG: 5 TABLET ORAL at 09:18

## 2018-06-25 ENCOUNTER — APPOINTMENT (OUTPATIENT)
Dept: PREADMISSION TESTING | Facility: HOSPITAL | Age: 78
End: 2018-06-25

## 2018-06-25 VITALS — BODY MASS INDEX: 35.85 KG/M2 | HEIGHT: 64 IN | WEIGHT: 210 LBS

## 2018-06-26 ENCOUNTER — TRANSCRIBE ORDERS (OUTPATIENT)
Dept: ADMINISTRATIVE | Facility: HOSPITAL | Age: 78
End: 2018-06-26

## 2018-06-26 DIAGNOSIS — R33.9 INCOMPLETE BLADDER EMPTYING: Primary | ICD-10-CM

## 2018-06-27 ENCOUNTER — TELEPHONE (OUTPATIENT)
Dept: SURGERY | Facility: CLINIC | Age: 78
End: 2018-06-27

## 2018-06-27 DIAGNOSIS — I65.29 STENOSIS OF CAROTID ARTERY, UNSPECIFIED LATERALITY: Primary | ICD-10-CM

## 2018-06-28 ENCOUNTER — HOSPITAL ENCOUNTER (OUTPATIENT)
Dept: ULTRASOUND IMAGING | Facility: HOSPITAL | Age: 78
Discharge: HOME | End: 2018-06-28
Admitting: INTERNAL MEDICINE

## 2018-06-28 DIAGNOSIS — R33.9 INCOMPLETE BLADDER EMPTYING: ICD-10-CM

## 2018-06-28 PROCEDURE — 76857 US EXAM PELVIC LIMITED: CPT

## 2018-06-28 NOTE — TELEPHONE ENCOUNTER
I spoke to patient's daughter and informed her  will need cardiac clearence and [facility] will be contacting her. Her pending

## 2018-07-02 ENCOUNTER — ANESTHESIA (OUTPATIENT)
Dept: PERIOP | Facility: HOSPITAL | Age: 78
End: 2018-07-02

## 2018-07-02 ENCOUNTER — ANESTHESIA EVENT (OUTPATIENT)
Dept: PERIOP | Facility: HOSPITAL | Age: 78
End: 2018-07-02

## 2018-07-02 ENCOUNTER — HOSPITAL ENCOUNTER (INPATIENT)
Facility: HOSPITAL | Age: 78
LOS: 1 days | Discharge: HOME | End: 2018-07-03
Attending: SURGERY | Admitting: SURGERY

## 2018-07-02 DIAGNOSIS — I65.23 CAROTID STENOSIS, ASYMPTOMATIC, BILATERAL: ICD-10-CM

## 2018-07-02 LAB
GLUCOSE BLDC GLUCOMTR-MCNC: 142 MG/DL (ref 70–130)
GLUCOSE BLDC GLUCOMTR-MCNC: 183 MG/DL (ref 70–130)
GLUCOSE BLDC GLUCOMTR-MCNC: 184 MG/DL (ref 70–130)
GLUCOSE BLDC GLUCOMTR-MCNC: 211 MG/DL (ref 70–130)
GLUCOSE BLDC GLUCOMTR-MCNC: 259 MG/DL (ref 70–130)
GLUCOSE BLDC GLUCOMTR-MCNC: 269 MG/DL (ref 70–130)
GLUCOSE BLDC GLUCOMTR-MCNC: 295 MG/DL (ref 70–130)
GLUCOSE BLDC GLUCOMTR-MCNC: 319 MG/DL (ref 70–130)

## 2018-07-02 PROCEDURE — 03CH0ZZ EXTIRPATION OF MATTER FROM RIGHT COMMON CAROTID ARTERY, OPEN APPROACH: ICD-10-PCS | Performed by: SURGERY

## 2018-07-02 PROCEDURE — 03CK0ZZ EXTIRPATION OF MATTER FROM RIGHT INTERNAL CAROTID ARTERY, OPEN APPROACH: ICD-10-PCS | Performed by: SURGERY

## 2018-07-02 PROCEDURE — 35800 EXPLORE NECK VESSELS: CPT | Performed by: SURGERY

## 2018-07-02 PROCEDURE — 25010000002 MIDAZOLAM PER 1 MG: Performed by: NURSE ANESTHETIST, CERTIFIED REGISTERED

## 2018-07-02 PROCEDURE — 25010000002 FENTANYL CITRATE (PF) 100 MCG/2ML SOLUTION: Performed by: NURSE ANESTHETIST, CERTIFIED REGISTERED

## 2018-07-02 PROCEDURE — 82962 GLUCOSE BLOOD TEST: CPT

## 2018-07-02 PROCEDURE — 63710000001 INSULIN ASPART PER 5 UNITS: Performed by: SURGERY

## 2018-07-02 PROCEDURE — 88304 TISSUE EXAM BY PATHOLOGIST: CPT | Performed by: SURGERY

## 2018-07-02 PROCEDURE — 94799 UNLISTED PULMONARY SVC/PX: CPT

## 2018-07-02 PROCEDURE — 03LY0ZZ OCCLUSION OF UPPER ARTERY, OPEN APPROACH: ICD-10-PCS | Performed by: SURGERY

## 2018-07-02 PROCEDURE — 25010000002 HEPARIN (PORCINE) PER 1000 UNITS: Performed by: SURGERY

## 2018-07-02 PROCEDURE — 0KC20ZZ EXTIRPATION OF MATTER FROM RIGHT NECK MUSCLE, OPEN APPROACH: ICD-10-PCS | Performed by: SURGERY

## 2018-07-02 PROCEDURE — 35301 RECHANNELING OF ARTERY: CPT | Performed by: SURGERY

## 2018-07-02 PROCEDURE — 63710000001 INSULIN REGULAR HUMAN PER 5 UNITS: Performed by: SURGERY

## 2018-07-02 PROCEDURE — 03CM0ZZ EXTIRPATION OF MATTER FROM RIGHT EXTERNAL CAROTID ARTERY, OPEN APPROACH: ICD-10-PCS | Performed by: SURGERY

## 2018-07-02 PROCEDURE — 25010000002 PROTAMINE SULFATE PER 10 MG: Performed by: NURSE ANESTHETIST, CERTIFIED REGISTERED

## 2018-07-02 PROCEDURE — 25010000002 HEPARIN (PORCINE) PER 1000 UNITS: Performed by: NURSE ANESTHETIST, CERTIFIED REGISTERED

## 2018-07-02 PROCEDURE — 25010000002 PROPOFOL 1000 MG/ML EMULSION: Performed by: NURSE ANESTHETIST, CERTIFIED REGISTERED

## 2018-07-02 PROCEDURE — 03JY0ZZ INSPECTION OF UPPER ARTERY, OPEN APPROACH: ICD-10-PCS | Performed by: SURGERY

## 2018-07-02 PROCEDURE — 25010000003 CEFAZOLIN PER 500 MG: Performed by: NURSE ANESTHETIST, CERTIFIED REGISTERED

## 2018-07-02 RX ORDER — ONDANSETRON 2 MG/ML
4 INJECTION INTRAMUSCULAR; INTRAVENOUS EVERY 6 HOURS PRN
Status: DISCONTINUED | OUTPATIENT
Start: 2018-07-02 | End: 2018-07-03 | Stop reason: HOSPADM

## 2018-07-02 RX ORDER — MAGNESIUM HYDROXIDE 1200 MG/15ML
LIQUID ORAL AS NEEDED
Status: DISCONTINUED | OUTPATIENT
Start: 2018-07-02 | End: 2018-07-02 | Stop reason: HOSPADM

## 2018-07-02 RX ORDER — SODIUM CHLORIDE 0.9 % (FLUSH) 0.9 %
1-10 SYRINGE (ML) INJECTION AS NEEDED
Status: DISCONTINUED | OUTPATIENT
Start: 2018-07-02 | End: 2018-07-03 | Stop reason: HOSPADM

## 2018-07-02 RX ORDER — ONDANSETRON 4 MG/1
4 TABLET, ORALLY DISINTEGRATING ORAL EVERY 6 HOURS PRN
Status: DISCONTINUED | OUTPATIENT
Start: 2018-07-02 | End: 2018-07-03 | Stop reason: HOSPADM

## 2018-07-02 RX ORDER — HEPARIN SODIUM 1000 [USP'U]/ML
INJECTION, SOLUTION INTRAVENOUS; SUBCUTANEOUS AS NEEDED
Status: DISCONTINUED | OUTPATIENT
Start: 2018-07-02 | End: 2018-07-02 | Stop reason: SURG

## 2018-07-02 RX ORDER — SODIUM CHLORIDE 9 MG/ML
100 INJECTION, SOLUTION INTRAVENOUS CONTINUOUS
Status: DISCONTINUED | OUTPATIENT
Start: 2018-07-02 | End: 2018-07-03 | Stop reason: HOSPADM

## 2018-07-02 RX ORDER — SODIUM CHLORIDE, SODIUM LACTATE, POTASSIUM CHLORIDE, CALCIUM CHLORIDE 600; 310; 30; 20 MG/100ML; MG/100ML; MG/100ML; MG/100ML
INJECTION, SOLUTION INTRAVENOUS CONTINUOUS PRN
Status: DISCONTINUED | OUTPATIENT
Start: 2018-07-02 | End: 2018-07-02 | Stop reason: SURG

## 2018-07-02 RX ORDER — FENTANYL CITRATE 50 UG/ML
INJECTION, SOLUTION INTRAMUSCULAR; INTRAVENOUS AS NEEDED
Status: DISCONTINUED | OUTPATIENT
Start: 2018-07-02 | End: 2018-07-02 | Stop reason: SURG

## 2018-07-02 RX ORDER — ROPINIROLE 1 MG/1
2 TABLET, FILM COATED ORAL EVERY 12 HOURS SCHEDULED
Status: DISCONTINUED | OUTPATIENT
Start: 2018-07-02 | End: 2018-07-03 | Stop reason: HOSPADM

## 2018-07-02 RX ORDER — SODIUM CHLORIDE 0.9 % (FLUSH) 0.9 %
1-10 SYRINGE (ML) INJECTION AS NEEDED
Status: DISCONTINUED | OUTPATIENT
Start: 2018-07-02 | End: 2018-07-02

## 2018-07-02 RX ORDER — MEPERIDINE HYDROCHLORIDE 50 MG/ML
25 INJECTION INTRAMUSCULAR; INTRAVENOUS; SUBCUTANEOUS
Status: DISCONTINUED | OUTPATIENT
Start: 2018-07-02 | End: 2018-07-02 | Stop reason: HOSPADM

## 2018-07-02 RX ORDER — PROTAMINE SULFATE 10 MG/ML
INJECTION, SOLUTION INTRAVENOUS AS NEEDED
Status: DISCONTINUED | OUTPATIENT
Start: 2018-07-02 | End: 2018-07-02 | Stop reason: SURG

## 2018-07-02 RX ORDER — MORPHINE SULFATE 2 MG/ML
2 INJECTION, SOLUTION INTRAMUSCULAR; INTRAVENOUS
Status: DISCONTINUED | OUTPATIENT
Start: 2018-07-02 | End: 2018-07-02 | Stop reason: HOSPADM

## 2018-07-02 RX ORDER — DEXAMETHASONE SODIUM PHOSPHATE 10 MG/ML
INJECTION, SOLUTION INTRAMUSCULAR; INTRAVENOUS
Status: DISPENSED
Start: 2018-07-02 | End: 2018-07-02

## 2018-07-02 RX ORDER — CARVEDILOL 25 MG/1
25 TABLET ORAL 2 TIMES DAILY WITH MEALS
Status: DISCONTINUED | OUTPATIENT
Start: 2018-07-02 | End: 2018-07-03 | Stop reason: HOSPADM

## 2018-07-02 RX ORDER — ONDANSETRON 4 MG/1
4 TABLET, FILM COATED ORAL EVERY 6 HOURS PRN
Status: DISCONTINUED | OUTPATIENT
Start: 2018-07-02 | End: 2018-07-03 | Stop reason: HOSPADM

## 2018-07-02 RX ORDER — LIDOCAINE HYDROCHLORIDE 10 MG/ML
INJECTION, SOLUTION INFILTRATION; PERINEURAL AS NEEDED
Status: DISCONTINUED | OUTPATIENT
Start: 2018-07-02 | End: 2018-07-02 | Stop reason: HOSPADM

## 2018-07-02 RX ORDER — LOSARTAN POTASSIUM 50 MG/1
50 TABLET ORAL 2 TIMES DAILY
Status: DISCONTINUED | OUTPATIENT
Start: 2018-07-02 | End: 2018-07-03 | Stop reason: HOSPADM

## 2018-07-02 RX ORDER — HYDROCODONE BITARTRATE AND ACETAMINOPHEN 7.5; 325 MG/1; MG/1
1 TABLET ORAL EVERY 4 HOURS PRN
Status: DISCONTINUED | OUTPATIENT
Start: 2018-07-02 | End: 2018-07-03 | Stop reason: HOSPADM

## 2018-07-02 RX ORDER — KETAMINE HYDROCHLORIDE 50 MG/ML
INJECTION, SOLUTION, CONCENTRATE INTRAMUSCULAR; INTRAVENOUS AS NEEDED
Status: DISCONTINUED | OUTPATIENT
Start: 2018-07-02 | End: 2018-07-02 | Stop reason: SURG

## 2018-07-02 RX ORDER — SODIUM CHLORIDE 9 MG/ML
500 INJECTION, SOLUTION INTRAVENOUS CONTINUOUS
Status: ACTIVE | OUTPATIENT
Start: 2018-07-02 | End: 2018-07-03

## 2018-07-02 RX ORDER — MEPERIDINE HYDROCHLORIDE 50 MG/ML
50 INJECTION INTRAMUSCULAR; INTRAVENOUS; SUBCUTANEOUS ONCE AS NEEDED
Status: DISCONTINUED | OUTPATIENT
Start: 2018-07-02 | End: 2018-07-02 | Stop reason: HOSPADM

## 2018-07-02 RX ORDER — HYDROCODONE BITARTRATE AND ACETAMINOPHEN 7.5; 325 MG/1; MG/1
1 TABLET ORAL ONCE AS NEEDED
Status: DISCONTINUED | OUTPATIENT
Start: 2018-07-02 | End: 2018-07-02 | Stop reason: HOSPADM

## 2018-07-02 RX ORDER — DEXTROSE AND SODIUM CHLORIDE 5; .45 G/100ML; G/100ML
100 INJECTION, SOLUTION INTRAVENOUS CONTINUOUS
Status: DISCONTINUED | OUTPATIENT
Start: 2018-07-02 | End: 2018-07-02

## 2018-07-02 RX ORDER — NICOTINE POLACRILEX 4 MG
1 LOZENGE BUCCAL
Status: DISCONTINUED | OUTPATIENT
Start: 2018-07-02 | End: 2018-07-03 | Stop reason: HOSPADM

## 2018-07-02 RX ORDER — INSULIN GLARGINE 100 [IU]/ML
70 INJECTION, SOLUTION SUBCUTANEOUS DAILY
COMMUNITY

## 2018-07-02 RX ORDER — DEXTROSE MONOHYDRATE 25 G/50ML
25 INJECTION, SOLUTION INTRAVENOUS
Status: DISCONTINUED | OUTPATIENT
Start: 2018-07-02 | End: 2018-07-03 | Stop reason: HOSPADM

## 2018-07-02 RX ORDER — MONTELUKAST SODIUM 10 MG/1
5 TABLET ORAL NIGHTLY
Status: DISCONTINUED | OUTPATIENT
Start: 2018-07-02 | End: 2018-07-03 | Stop reason: HOSPADM

## 2018-07-02 RX ORDER — MIDAZOLAM HYDROCHLORIDE 1 MG/ML
INJECTION INTRAMUSCULAR; INTRAVENOUS AS NEEDED
Status: DISCONTINUED | OUTPATIENT
Start: 2018-07-02 | End: 2018-07-02 | Stop reason: SURG

## 2018-07-02 RX ORDER — AMLODIPINE BESYLATE 5 MG/1
5 TABLET ORAL DAILY
Status: DISCONTINUED | OUTPATIENT
Start: 2018-07-02 | End: 2018-07-03 | Stop reason: HOSPADM

## 2018-07-02 RX ORDER — ONDANSETRON 2 MG/ML
4 INJECTION INTRAMUSCULAR; INTRAVENOUS ONCE AS NEEDED
Status: DISCONTINUED | OUTPATIENT
Start: 2018-07-02 | End: 2018-07-02 | Stop reason: HOSPADM

## 2018-07-02 RX ADMIN — HYDROCODONE BITARTRATE AND ACETAMINOPHEN 1 TABLET: 7.5; 325 TABLET ORAL at 10:53

## 2018-07-02 RX ADMIN — INSULIN ASPART 4 UNITS: 100 INJECTION, SOLUTION INTRAVENOUS; SUBCUTANEOUS at 17:04

## 2018-07-02 RX ADMIN — FENTANYL CITRATE 50 MCG: 50 INJECTION, SOLUTION INTRAMUSCULAR; INTRAVENOUS at 07:36

## 2018-07-02 RX ADMIN — SODIUM CHLORIDE 500 ML: 9 INJECTION, SOLUTION INTRAVENOUS at 07:10

## 2018-07-02 RX ADMIN — METFORMIN HYDROCHLORIDE 500 MG: 500 TABLET, FILM COATED ORAL at 10:53

## 2018-07-02 RX ADMIN — SODIUM CHLORIDE 100 ML/HR: 9 INJECTION, SOLUTION INTRAVENOUS at 15:03

## 2018-07-02 RX ADMIN — HEPARIN SODIUM 5000 UNITS: 1000 INJECTION, SOLUTION INTRAVENOUS; SUBCUTANEOUS at 07:53

## 2018-07-02 RX ADMIN — INSULIN ASPART 3 UNITS: 100 INJECTION, SOLUTION INTRAVENOUS; SUBCUTANEOUS at 21:14

## 2018-07-02 RX ADMIN — CARVEDILOL 25 MG: 25 TABLET, FILM COATED ORAL at 17:04

## 2018-07-02 RX ADMIN — SODIUM CHLORIDE, POTASSIUM CHLORIDE, SODIUM LACTATE AND CALCIUM CHLORIDE: 600; 310; 30; 20 INJECTION, SOLUTION INTRAVENOUS at 07:20

## 2018-07-02 RX ADMIN — ROPINIROLE HYDROCHLORIDE 2 MG: 1 TABLET, FILM COATED ORAL at 10:52

## 2018-07-02 RX ADMIN — FENTANYL CITRATE 50 MCG: 50 INJECTION, SOLUTION INTRAMUSCULAR; INTRAVENOUS at 07:27

## 2018-07-02 RX ADMIN — SODIUM CHLORIDE, POTASSIUM CHLORIDE, SODIUM LACTATE AND CALCIUM CHLORIDE: 600; 310; 30; 20 INJECTION, SOLUTION INTRAVENOUS at 08:20

## 2018-07-02 RX ADMIN — CEFAZOLIN SODIUM 2 G: 2 SOLUTION INTRAVENOUS at 07:20

## 2018-07-02 RX ADMIN — MONTELUKAST SODIUM 5 MG: 10 TABLET, FILM COATED ORAL at 21:14

## 2018-07-02 RX ADMIN — KETAMINE HYDROCHLORIDE 25 MG: 50 INJECTION, SOLUTION INTRAMUSCULAR; INTRAVENOUS at 11:48

## 2018-07-02 RX ADMIN — MIDAZOLAM HYDROCHLORIDE 1 MG: 1 INJECTION, SOLUTION INTRAMUSCULAR; INTRAVENOUS at 11:48

## 2018-07-02 RX ADMIN — LOSARTAN POTASSIUM 50 MG: 50 TABLET, FILM COATED ORAL at 21:14

## 2018-07-02 RX ADMIN — PROPOFOL 50 MCG/KG/MIN: 10 INJECTION, EMULSION INTRAVENOUS at 07:27

## 2018-07-02 RX ADMIN — MIDAZOLAM HYDROCHLORIDE 1 MG: 1 INJECTION, SOLUTION INTRAMUSCULAR; INTRAVENOUS at 12:03

## 2018-07-02 RX ADMIN — PROPOFOL 50 MCG/KG/MIN: 10 INJECTION, EMULSION INTRAVENOUS at 11:58

## 2018-07-02 RX ADMIN — PROTAMINE SULFATE 50 MG: 10 INJECTION, SOLUTION INTRAVENOUS at 12:07

## 2018-07-02 RX ADMIN — HUMAN INSULIN 20 UNITS: 100 INJECTION, SOLUTION SUBCUTANEOUS at 13:12

## 2018-07-02 RX ADMIN — DEXTROSE AND SODIUM CHLORIDE 100 ML/HR: 5; 450 INJECTION, SOLUTION INTRAVENOUS at 09:33

## 2018-07-02 RX ADMIN — ROPINIROLE HYDROCHLORIDE 2 MG: 1 TABLET, FILM COATED ORAL at 21:14

## 2018-07-02 RX ADMIN — HYDROCODONE BITARTRATE AND ACETAMINOPHEN 1 TABLET: 7.5; 325 TABLET ORAL at 21:18

## 2018-07-02 NOTE — ANESTHESIA PREPROCEDURE EVALUATION
Anesthesia Evaluation     Patient summary reviewed and Nursing notes reviewed   no history of anesthetic complications:  NPO Solid Status: > 8 hours  NPO Liquid Status: > 8 hours           Airway   Mallampati: II  TM distance: >3 FB  Neck ROM: full  No difficulty expected  Dental    (+) edentulous, upper dentures and lower dentures    Pulmonary - normal exam   (+) a smoker Former,     ROS comment: Quit smoking 50 years ago  Cardiovascular - normal exam  Exercise tolerance: poor (<4 METS)    ECG reviewed  PT is on anticoagulation therapy  Patient on routine beta blocker and Beta blocker given within 24 hours of surgery    (+) hypertension well controlled 2 medications or greater, CAD, cardiac stents Drug eluting stent within the past 12 months CHF, PVD, hyperlipidemia,  carotid artery disease right carotid    ROS comment: 6/11/18 cardiac cath  Conclusions:     #1 Successful stenting of the distal LAD (drug-eluting stent 3 x 15 mm) reducing a critical lesion to remnant of 0% improving at TRISTEN 2 flow to TRISTEN-3 flow.     #2 successful stenting of the mid LAD (drug-eluting stent 3.5 x 33 mm) oozing a critical lesion to a remnant of 0% improving at TRISTEN 2 flow to TRISTEN-3 flow.     #3 persistent calcific 30-50% narrowing in the proximal LAD thought to be physiologically insignificant by FFR evaluation (0.85) during intravenous adenosine infusion.     #4 borderline FFR values 1 minute into adenosine infusion in the RCA (0.82)-adenosine terminated due to severe bradycardia.     #5 successful angioplasty with stenting of the proximal RCA (drug-eluting stent 2.75 x 33 mm) reducing a critical lesion to a remnant of 0% been maintained TRISTEN-3 flow before and after.     #6 successful post dilatation of the proximal aspect of the bowel stent by 3 mm noncompliant balloon.    5/17/18 Carotid doppler    IMPRESSION:     1. Severe plaque in the right carotid bulb and proximal ICA producing a  greater than 70% stenosis.  2. Mild plaque  in the left carotid bulb producing a less than 50%  stenosis. Of note there is retrograde flow in the vertebral artery which  may reflect a significant stenosis at the origin of the left subclavian  artery.             Neuro/Psych  (+) headaches,     GI/Hepatic/Renal/Endo    (+)  GERD well controlled,  diabetes mellitus type 1 poorly controlled using insulin,     Musculoskeletal     Abdominal  - normal exam   Substance History      OB/GYN          Other   (+) arthritis       Other Comment: Fasting blood glucose = 184 mg/dl DOS                Anesthesia Plan    ASA 3     MAC and regional   (Patient advised that intravenous sedation would be used as the primary anesthetic, along with local anesthesia if necessary. Every effort will be made to make sure the patient is comfortable.     The patient was told that they may experience recall of the procedure. The patient was further advised that if the MAC anesthetic was deemed ineffective that general anesthesia administered via LMA or ETT may be required.    Patient verbalized understanding and agreed to plan of care.     Intermediate cervical plexus PNB discussed to be used as primary anesthetic. Pt advised of risks/benefits of procedure. Pt verbalized understanding of procedure and agreed to plan. )  intravenous induction   Anesthetic plan and risks discussed with patient.

## 2018-07-02 NOTE — ANESTHESIA PROCEDURE NOTES
Peripheral Block    Start time: 7/2/2018 7:10 AM  Stop time: 7/2/2018 7:14 AM  Reason for block: at surgeon's request and post-op pain management  Performed by  CRNA: SANDY LOCKHART  Preanesthetic Checklist  Completed: patient identified, site marked, surgical consent, pre-op evaluation, timeout performed, IV checked, risks and benefits discussed and monitors and equipment checked  Prep:  Pt Position: supine  Sterile barriers:cap, gloves, mask and sterile barriers  Prep: ChloraPrep  Patient monitoring: blood pressure monitoring, continuous pulse oximetry and EKG  Procedure  Performed under: local infiltration  Guidance:ultrasound guided  Nerve locations: superficial ceerv plexus. Images:still images obtained    Anesthesia block type: superficial cervical plexus.  Injection Technique:single-shot  Needle Type:echogenic  Needle Gauge:22 G  Resistance on Injection: none  Medications  Comment:Adjuncts per total volume of LA:    [id number] mg PSF      If required, intravenous sedation was given -- see meds on anesthesia record.  Local Injected:bupivacaine 0.25% Local Amount Injected:10mL  Post Assessment  Injection Assessment: negative aspiration for heme, no paresthesia on injection and incremental injection  Patient Tolerance:comfortable throughout block  Complications:no  Additional Notes    ++++

## 2018-07-02 NOTE — ANESTHESIA POSTPROCEDURE EVALUATION
Patient: Dai Murphy    Procedure Summary     Date:  07/02/18 Room / Location:  [facility] OR  /  [facility] OR    Anesthesia Start:  0720 Anesthesia Stop:  0832    Procedure:  CAROTID ENDARTERECTOMY (Right Neck) Diagnosis:       Carotid stenosis, asymptomatic, bilateral      (Carotid stenosis, asymptomatic, bilateral [I65.23])    Surgeon:  Nate Matthew MD Provider:  Kevin Lara CRNA    Anesthesia Type:  MAC, regional ASA Status:  3          Anesthesia Type: MAC, regional  Last vitals  BP   132/44 (07/02/18 0918)   Temp   97.3 °F (36.3 °C) (07/02/18 0829)   Pulse   65 (07/02/18 0918)   Resp   18 (07/02/18 0918)     SpO2   100 % (07/02/18 0918)     Post Anesthesia Care and Evaluation    Patient location during evaluation: bedside  Patient participation: complete - patient participated  Level of consciousness: awake  Pain score: 0  Pain management: adequate  Airway patency: patent  Anesthetic complications: No anesthetic complications  PONV Status: controlled  Cardiovascular status: acceptable and stable  Respiratory status: acceptable and room air  Hydration status: acceptable

## 2018-07-02 NOTE — PLAN OF CARE
Problem: Skin Injury Risk (Adult)  Goal: Skin Health and Integrity  Outcome: Ongoing (interventions implemented as appropriate)   07/02/18 1110   Skin Injury Risk (Adult)   Skin Health and Integrity achieves outcome       Problem: Pain, Acute (Adult)  Goal: Acceptable Pain Control/Comfort Level  Outcome: Ongoing (interventions implemented as appropriate)   07/02/18 1110   Pain, Acute (Adult)   Acceptable Pain Control/Comfort Level making progress toward outcome

## 2018-07-02 NOTE — DISCHARGE INSTR - OTHER ORDERS
If you have any questions about your recovery, please call the [facility] Nurse Call Center at [phone]. A registered nurse is available 24 hours a day 7 days a week to assist you.

## 2018-07-02 NOTE — SIGNIFICANT NOTE
not in sds wr and na at cell phone at [phone]. Reached via cell phone at [phone] to inform him pt in pacu and room assignment.

## 2018-07-02 NOTE — OP NOTE
PATIENT:    Dai Murphy    DATE OF SURGERY:  7/2/2018    PHYSICIAN:    Nate Matthew MD    REFERRING PHYSICIAN:  Nate Matthew MD    YOB: 1940    PREOPERATIVE DIAGNOSIS:  Hematoma postop from right carotid endarterectomy    POSTOPERATIVE DIAGNOSIS:  Small arterial bleeder in the right sternocleidomastoid muscle with evacuation of hematoma    PROCEDURE:  Exploration right carotid endarterectomy site with ligation of small artery in the sternocleidomastoid muscle and evacuation of hematoma.    INDICATIONS:  The patient was sent to me as a consultation by Nate Matthew MD for evaluation and treatment of a history significant for right carotid hematoma about 4 hours postop, quickly expanding.  Airway not compromised.. They are here now today for evacuation of hematoma and expiration of wound for possible arterial bleeder.    ANESTHESIA:  Sedation with local Anesthesia     OPERATIVE PROCEDURE:  The patient was taken to the operating room, placed in the supine position, and given sedation with local anesthesia.  They were prepped and draped in the normal sterile fashion.  They did receive preoperative IV antibiotics.  The nursing staff did perform intraoperative timeout prior to the incision.    The incision was opened on the right neck and a small arterial bleeder was found the sternocleidomastoid muscle.  It was quickly ligated with 2 clips.  All other bleeding was observed closely and cauterized.  No bleeding from the anastomosis and arteriotomy site was seen.  I placed thrombin in the wound for hemostasis.  After close observation and no further bleeding was visualized.  I went ahead and closed the platysma with 3-0 Vicryl and the skin with 4-0 Vicryl suture.  Procedure tolerated well, no complications.  EBL was 20 cc.    The patient was stable at this point in time and subsequently transferred back to the recovery room in stable condition.       Nate Matthew MD  7/2/2018  12:08  PM

## 2018-07-02 NOTE — OP NOTE
PATIENT:    Dai Murphy    DATE OF SURGERY:  7/2/2018    PHYSICIAN:    Nate Matthew MD    REFERRING PHYSICIAN:  Nate Matthew MD    YOB: 1940    PREOPERATIVE DIAGNOSIS:  Right carotid stenosis greater than 90%    POSTOPERATIVE DIAGNOSIS:  Same    PROCEDURE:  Right carotid endarterectomy    INDICATIONS:  The patient was sent to me as a consultation by Nate Matthew MD for evaluation and treatment of a history significant for right carotid stenosis greater than 90%. They are here now today for right carotid endarterectomy    ANESTHESIA:  Sedation with regional Anesthesia     OPERATIVE PROCEDURE:  The patient was taken to the operating room, placed in the supine position, and given sedation with regional anesthesia.  They were prepped and draped in the normal sterile fashion.  They did receive preoperative IV antibiotics.  The nursing staff did perform intraoperative timeout prior to the incision.    Incision made along the right border of the sternocleidomastoid muscle.  The platysma was divided.  The facial vein was also divided.  I was able to get control of the internal carotid artery, external carotid artery and the common carotid artery.  Careful not to injure the nerves.  I then gave 5000 units of heparin IV and clamped after 5 minutes.  Patient remained asymptomatic during clamping, was able to move her left arm and Capoten.  I then opened up the artery and made an arteriotomy.  I did a endarterectomy and remove the entire plaque with an excellent endpoint in the internal carotid artery.  There was good backbleeding from the ICA.  I irrigated copiously with saline and removed all loose debris.  I then closed the arteriotomy with a 6-0 Prolene suture from each and an met in the center.  I then opened up the external carotid and common carotid flushed it.  Finally the ICA was opened.  Clamp time was 13 minutes.  Patient remained neurologically intact after unclamping.  Bleeding was  stopped with Surgicel and cautery.  The platysma was closed with a running suture and the skin with 4-0 nylon suture.  Dressing applied, no complications.  EBL was 20 cc.    The patient was stable at this point in time and subsequently transferred back to the recovery room in stable condition.       Nate Matthew MD  7/2/2018  8:40 AM

## 2018-07-02 NOTE — ANESTHESIA POSTPROCEDURE EVALUATION
Patient: Dai Murphy    Procedure Summary     Date:  07/02/18 Room / Location:  [facility] OR  /  [facility] OR    Anesthesia Start:  1145 Anesthesia Stop:      Procedure:  CAROTID ENDARTERECTOMY EXPLORATION AND EVACUATION OF HEMATOMA; LIGATION OF UN-NAMED  ARTERY (Right Neck) Diagnosis:      Surgeon:  Nate Matthew MD Provider:  Clemente David CRNA    Anesthesia Type:  MAC, regional ASA Status:  3          Anesthesia Type: MAC, regional  Last vitals  BP   141/59 (07/02/18 1135)   Temp   97.9 °F (36.6 °C) (07/02/18 1000)   Pulse   67 (07/02/18 1135)   Resp   16 (07/02/18 1000)     SpO2   100 % (07/02/18 1135)     Post Anesthesia Care and Evaluation    Patient location during evaluation: PHASE II  Patient participation: complete - patient participated  Level of consciousness: awake  Pain score: 0  Pain management: adequate  Airway patency: patent  Anesthetic complications: No anesthetic complications  PONV Status: none  Cardiovascular status: acceptable  Respiratory status: acceptable and nasal cannula  Hydration status: acceptable    Comments: vsss resp spont, reflexes intact, responsive, report given to pacu nurse

## 2018-07-02 NOTE — NURSING NOTE
Upon reassessment, patient noted to have increased swelling to incision site. Drainage noted to be darker red in color. Patient complains of increased pain and difficulty swallowing. Dr. Matthew phoned and message given to ARAVIND Harrison RN of patient assessment. Orders given to return to OR at this time. 4x4 gauze applied to site. Process explained to patient, transport monitor applied and patient transported to OR in stable condition with RN and monitor tech at 1130

## 2018-07-03 VITALS
SYSTOLIC BLOOD PRESSURE: 101 MMHG | OXYGEN SATURATION: 100 % | WEIGHT: 218.7 LBS | HEART RATE: 58 BPM | DIASTOLIC BLOOD PRESSURE: 69 MMHG | TEMPERATURE: 98.2 F | HEIGHT: 64 IN | BODY MASS INDEX: 37.34 KG/M2 | RESPIRATION RATE: 20 BRPM

## 2018-07-03 LAB
ANION GAP SERPL CALCULATED.3IONS-SCNC: 11.3 MMOL/L (ref 10–20)
BUN BLD-MCNC: 25 MG/DL (ref 7–20)
BUN/CREAT SERPL: 25 (ref 7.1–23.5)
CALCIUM SPEC-SCNC: 8.4 MG/DL (ref 8.4–10.2)
CHLORIDE SERPL-SCNC: 111 MMOL/L (ref 98–107)
CO2 SERPL-SCNC: 22 MMOL/L (ref 26–30)
CREAT BLD-MCNC: 1 MG/DL (ref 0.6–1.3)
GFR SERPL CREATININE-BSD FRML MDRD: 54 ML/MIN/1.73
GLUCOSE BLD-MCNC: 113 MG/DL (ref 74–98)
GLUCOSE BLDC GLUCOMTR-MCNC: 120 MG/DL (ref 70–130)
GLUCOSE BLDC GLUCOMTR-MCNC: 147 MG/DL (ref 70–130)
GLUCOSE BLDC GLUCOMTR-MCNC: 207 MG/DL (ref 70–130)
POTASSIUM BLD-SCNC: 5.3 MMOL/L (ref 3.5–5.1)
SODIUM BLD-SCNC: 139 MMOL/L (ref 137–145)

## 2018-07-03 PROCEDURE — 63710000001 INSULIN ASPART PER 5 UNITS: Performed by: SURGERY

## 2018-07-03 PROCEDURE — 80048 BASIC METABOLIC PNL TOTAL CA: CPT | Performed by: SURGERY

## 2018-07-03 PROCEDURE — 99024 POSTOP FOLLOW-UP VISIT: CPT | Performed by: SURGERY

## 2018-07-03 PROCEDURE — 82962 GLUCOSE BLOOD TEST: CPT

## 2018-07-03 PROCEDURE — 63710000001 INSULIN DETEMIR PER 5 UNITS: Performed by: SURGERY

## 2018-07-03 RX ORDER — FLUTICASONE PROPIONATE 50 MCG
2 SPRAY, SUSPENSION (ML) NASAL DAILY
Status: ON HOLD | COMMUNITY
End: 2018-07-03

## 2018-07-03 RX ORDER — HYDROCODONE BITARTRATE AND ACETAMINOPHEN 7.5; 325 MG/1; MG/1
1 TABLET ORAL EVERY 4 HOURS PRN
Qty: 24 TABLET | Refills: 0 | Status: SHIPPED | OUTPATIENT
Start: 2018-07-03 | End: 2018-07-12

## 2018-07-03 RX ADMIN — HYDROCODONE BITARTRATE AND ACETAMINOPHEN 1 TABLET: 7.5; 325 TABLET ORAL at 03:03

## 2018-07-03 RX ADMIN — LOSARTAN POTASSIUM 50 MG: 50 TABLET, FILM COATED ORAL at 08:38

## 2018-07-03 RX ADMIN — METFORMIN HYDROCHLORIDE 500 MG: 500 TABLET, FILM COATED ORAL at 08:38

## 2018-07-03 RX ADMIN — CARVEDILOL 25 MG: 25 TABLET, FILM COATED ORAL at 08:38

## 2018-07-03 RX ADMIN — ROPINIROLE HYDROCHLORIDE 2 MG: 1 TABLET, FILM COATED ORAL at 08:38

## 2018-07-03 RX ADMIN — INSULIN ASPART 3 UNITS: 100 INJECTION, SOLUTION INTRAVENOUS; SUBCUTANEOUS at 12:05

## 2018-07-03 RX ADMIN — AMLODIPINE BESYLATE 5 MG: 5 TABLET ORAL at 08:38

## 2018-07-03 RX ADMIN — SODIUM CHLORIDE 100 ML/HR: 9 INJECTION, SOLUTION INTRAVENOUS at 00:38

## 2018-07-03 RX ADMIN — INSULIN DETEMIR 70 UNITS: 100 INJECTION, SOLUTION SUBCUTANEOUS at 06:37

## 2018-07-03 NOTE — PROGRESS NOTES
Discharge Planning Assessment  [facility]     Patient Name: Dai Murphy  MRN: [id number]  Today's Date: 7/3/2018    Admit Date: 7/2/2018          Discharge Needs Assessment     Row Name 07/03/18 1034       Discharge Needs Assessment    Readmission Within the Last 30 Days no previous admission in last 30 days    Concerns to be Addressed discharge planning    Equipment Needed After Discharge none    Discharge Coordination/Progress home with  no home health, but  checks onthem            Discharge Plan     Row Name 07/03/18 1035       Plan    Plan Sw spoke to patient and  rearding discharge plan Lives with . Denies needs.        Destination     No service coordination in this encounter.      Durable Medical Equipment     No service coordination in this encounter.      Dialysis/Infusion     No service coordination in this encounter.      Home Medical Care     No service coordination in this encounter.      Social Care     No service coordination in this encounter.        Expected Discharge Date and Time     Expected Discharge Date Expected Discharge Time    Jul 3, 2018               Demographic Summary     Row Name 07/03/18 1033       General Information    Admission Type inpatient    Arrived From home    Expected Length of Stay (LOS) 1    Referral Source admission list    Reason for Consult discharge planning    Preferred Language English            Functional Status     Row Name 07/03/18 1033       Functional Status, IADL    Medications independent    Meal Preparation independent    Housekeeping assistive person    Laundry assistive person    Shopping assistive person            Psychosocial    No documentation.           Abuse/Neglect    No documentation.           Legal    No documentation.           Substance Abuse    No documentation.           Patient Forms    No documentation.         nAya Baptiste

## 2018-07-03 NOTE — DISCHARGE INSTRUCTIONS
Please follow all post op instructions and follow up appointment time from your physician's office included in your discharge packet.  .   No pushing, pulling, tugging,  heavy lifting, or strenuous activity.  Always use good hand hygiene/washing techniques.  NO driving while taking pain medications.To assist you in voiding:  Drink plenty of fluids  Listen to running water while attempting to void.    May remove dressing in 2 days. May shower in 2 days.

## 2018-07-03 NOTE — PROGRESS NOTES
"Patient: Dai Murphy  Procedure(s):  CAROTID ENDARTERECTOMY EXPLORATION AND EVACUATION OF HEMATOMA; LIGATION OF UN-NAMED  ARTERY  Anesthesia type: [unfilled]    Patient location: ICU  Last vitals: /66 (BP Location: Right arm, Patient Position: Lying)   Pulse 59   Temp 97.9 °F (36.6 °C) (Oral)   Resp 18   Ht 162.6 cm (64\")   Wt 99.2 kg (218 lb 11.2 oz)   SpO2 98%   BMI 37.54 kg/m²   Level of consciousness: awake, alert and oriented    Post-anesthesia pain: adequate analgesia  Airway patency: patent  Respiratory: unassisted  Cardiovascular: stable and blood pressure at baseline  Hydration: euvolemic    Anesthetic complications: no       "

## 2018-07-03 NOTE — DISCHARGE SUMMARY
Date of Discharge:  7/3/2018    Discharge Diagnosis:   Right carotid stenosis    Problem List:  Principal Problem:    Carotid stenosis, asymptomatic, bilateral      Presenting Problem/History of Present Illness  Carotid stenosis, asymptomatic, bilateral [I65.23]  Carotid stenosis, asymptomatic, bilateral [I65.23]        Hospital Course  Patient is a 77 y.o. female presented with right carotid stenosis greater than 90%.  Patient underwent successful right CEA.,  4 hours postop, patient developed an expanding hematoma in the right neck.  Patient taken operating room immediately and had hematoma decompressed, was found to have a small arterial bleed in the sternocleidomastoid muscle.  It was ligated with 2 clips.  Patient taken back up stairs was doing well.  No neurologic deficits.  After 24 hours, ready for discharge.  Patient was on Plavix and aspirin given procedure due to recent stent placement..        Physical Exam:     General Appearance:    Alert, cooperative, in no acute distress   Head:    Normocephalic, without obvious abnormality, atraumatic   Eyes:            Lids and lashes normal, conjunctivae and sclerae normal, no   icterus, no pallor, corneas clear, PERRLA   Ears:    Ears appear intact with no abnormalities noted   Throat:   No oral lesions, no thrush, oral mucosa moist   Neck:   No adenopathy, supple, trachea midline, no thyromegaly, no     carotid bruit, no JVD   Back:     No kyphosis present, no scoliosis present, no skin lesions,       erythema or scars, no tenderness to percussion or                   palpation,   range of motion normal   Lungs:     Clear to auscultation,respirations regular, even and                   unlabored    Heart:    Regular rhythm and normal rate, normal S1 and S2, no            murmur, no gallop, no rub, no click   Breast Exam:    Deferred   Abdomen:     Normal bowel sounds, no masses, no organomegaly, soft        non-tender, non-distended, no guarding, no rebound                  tenderness   Genitalia:    Deferred   Extremities:   Moves all extremities well, no edema, no cyanosis, no              redness   Pulses:   Pulses palpable and equal bilaterally   Skin:   No bleeding, bruising or rash   Lymph nodes:   No palpable adenopathy   Neurologic:   Cranial nerves 2 - 12 grossly intact, sensation intact, DTR        present and equal bilaterally       Procedures Performed  Procedure(s):  CAROTID ENDARTERECTOMY EXPLORATION AND EVACUATION OF HEMATOMA; LIGATION OF UN-NAMED  ARTERY       Consults:   Consults     No orders found for last 30 day(s).          Pertinent labs/studies reviewed with patient    Condition on Discharge:  Stable    Vital Signs  Temp:  [97.3 °F (36.3 °C)-98.3 °F (36.8 °C)] 97.9 °F (36.6 °C)  Heart Rate:  [52-72] 58  Resp:  [13-19] 18  BP: ()/(39-98) 118/49    Discharge Disposition  Home or Self Care    Discharge Medications     Discharge Medications      New Medications      Instructions Start Date   HYDROcodone-acetaminophen 7.5-325 MG per tablet  Commonly known as:  NORCO   1 tablet, Oral, Every 4 Hours PRN         Continue These Medications      Instructions Start Date   amLODIPine 5 MG tablet  Commonly known as:  NORVASC   5 mg, Oral, Daily      aspirin 81 MG chewable tablet   Take 81 mg by mouth daily.      atorvastatin 80 MG tablet  Commonly known as:  LIPITOR   80 mg, Oral, Nightly      calcium carbonate 600 MG tablet  Commonly known as:  OS-TONYA   600 mg, Oral, Daily      carvedilol 25 MG tablet  Commonly known as:  COREG   Take 25 mg by mouth 2 times daily (with meals).      clopidogrel 75 MG tablet  Commonly known as:  PLAVIX   75 mg, Oral, Daily      insulin glargine 100 UNIT/ML injection  Commonly known as:  LANTUS   70 Units, Subcutaneous, Daily      levothyroxine 75 MCG tablet  Commonly known as:  SYNTHROID, LEVOTHROID   Take 75 mcg by mouth Daily.      losartan 100 MG tablet  Commonly known as:  COZAAR   Take 100 mg by mouth daily.       metFORMIN 500 MG tablet  Commonly known as:  GLUCOPHAGE   Take 500 mg by mouth 2 times daily (with meals).      montelukast 10 MG tablet  Commonly known as:  SINGULAIR   Take 10 mg by mouth nightly.      oxyCODONE-acetaminophen  MG per tablet  Commonly known as:  PERCOCET   1 tablet, Oral, Every 6 Hours PRN      rOPINIRole 2 MG tablet  Commonly known as:  REQUIP   Take 2 mg by mouth 2 times daily.             Discharge Diet       Activity at Discharge      Follow-up Appointments  No future appointments.  Additional Instructions for the Follow-ups that You Need to Schedule     Discharge Follow-up with Specialty: richard; 1 Week    As directed      Specialty:  richard    Follow Up:  1 Week                 Nate Matthew MD  7/3/2018  4:59 AM

## 2018-07-03 NOTE — PLAN OF CARE
Problem: Carotid Endarterectomy (Adult)  Goal: Signs and Symptoms of Listed Potential Problems Will be Absent, Minimized or Managed (Carotid Endarterectomy)  Outcome: Ongoing (interventions implemented as appropriate)

## 2018-07-05 NOTE — PROGRESS NOTES
Case Management Discharge Note         Destination     No service has been selected for the patient.      Durable Medical Equipment     No service has been selected for the patient.      Dialysis/Infusion     No service has been selected for the patient.      Home Medical Care     No service has been selected for the patient.      Social Care     No service has been selected for the patient.        Other: Other    Final Discharge Disposition Code: 01 - home or self-care

## 2018-07-07 LAB
LAB AP CASE REPORT: NORMAL
PATH REPORT.FINAL DX SPEC: NORMAL

## 2018-07-12 ENCOUNTER — OFFICE VISIT (OUTPATIENT)
Dept: SURGERY | Facility: CLINIC | Age: 78
End: 2018-07-12

## 2018-07-12 VITALS
SYSTOLIC BLOOD PRESSURE: 98 MMHG | HEIGHT: 64 IN | WEIGHT: 218.7 LBS | BODY MASS INDEX: 37.34 KG/M2 | OXYGEN SATURATION: 99 % | TEMPERATURE: 96.8 F | DIASTOLIC BLOOD PRESSURE: 47 MMHG | HEART RATE: 67 BPM

## 2018-07-12 DIAGNOSIS — Z48.89 POSTOPERATIVE VISIT: Primary | ICD-10-CM

## 2018-07-12 PROCEDURE — 99024 POSTOP FOLLOW-UP VISIT: CPT | Performed by: SURGERY

## 2018-07-12 NOTE — PROGRESS NOTES
"Patient: Dai Murphy    YOB: 1940    Date: 07/12/2018    Primary Care Provider: Buck Stern MD    Chief Complaint   Patient presents with   • Post-op     CEA       History: The patient is in the office today for a post op visit after her recent right CEA.  She says that she is doing well since the procedure. The incision is healing well.  She complains of being tired since the surgery.noted TIA or stroke symptoms. Patient had postop hematoma, doing well from that standpoint.    The following portions of the patient's history were reviewed and updated as appropriate: allergies, current medications, past family history, past medical history, past social history, past surgical history and problem list.      Review of Systems   Constitutional: Negative for chills, fatigue and fever.   Respiratory: Negative for cough.    Cardiovascular: Negative for chest pain.   Gastrointestinal: Negative for abdominal pain, diarrhea, nausea and vomiting.       Vital Signs  Vitals:    07/12/18 1103   BP: 98/47   Pulse: 67   Temp: 96.8 °F (36 °C)   TempSrc: Temporal Artery    SpO2: 99%   Weight: 99.2 kg (218 lb 11.1 oz)   Height: 162.6 cm (64.02\")       Allergies:  No Known Allergies    Medications:    Current Outpatient Prescriptions:   •  amLODIPine (NORVASC) 5 MG tablet, Take 5 mg by mouth Daily., Disp: , Rfl:   •  aspirin 81 MG chewable tablet, Take 81 mg by mouth daily., Disp: , Rfl:   •  atorvastatin (LIPITOR) 80 MG tablet, Take 1 tablet by mouth Every Night., Disp: 90 tablet, Rfl: 3  •  calcium carbonate (OS-TONYA) 600 MG tablet, Take 600 mg by mouth Daily., Disp: , Rfl:   •  carvedilol (COREG) 25 MG tablet, Take 25 mg by mouth 2 times daily (with meals)., Disp: , Rfl:   •  clopidogrel (PLAVIX) 75 MG tablet, Take 1 tablet by mouth Daily., Disp: 90 tablet, Rfl: 3  •  HYDROcodone-acetaminophen (NORCO) 7.5-325 MG per tablet, Take 1 tablet by mouth Every 4 (Four) Hours As Needed for Moderate Pain, Disp: 24 " tablet, Rfl: 0  •  insulin glargine (LANTUS) 100 UNIT/ML injection, Inject 70 Units under the skin Daily., Disp: , Rfl:   •  levothyroxine (SYNTHROID, LEVOTHROID) 75 MCG tablet, Take 75 mcg by mouth Daily., Disp: , Rfl:   •  losartan (COZAAR) 100 MG tablet, Take 100 mg by mouth daily., Disp: , Rfl:   •  metFORMIN (GLUCOPHAGE) 500 MG tablet, Take 500 mg by mouth 2 times daily (with meals)., Disp: , Rfl:   •  montelukast (SINGULAIR) 10 MG tablet, Take 10 mg by mouth nightly., Disp: , Rfl:   •  oxyCODONE-acetaminophen (PERCOCET)  MG per tablet, Take 1 tablet by mouth Every 6 (Six) Hours As Needed for Moderate Pain ., Disp: , Rfl:   •  rOPINIRole (REQUIP) 2 MG tablet, Take 2 mg by mouth 2 times daily., Disp: , Rfl:     Physical Exam:   General Appearance:    Alert, cooperative, in no acute distress   Head:    Normocephalic, without obvious abnormality, atraumatic   Lungs:     Clear to auscultation,respirations regular, even and                  unlabored    Heart:    Regular rhythm and normal rate, normal S1 and S2, no            murmur, no gallop, no rub, no click   Abdomen:     Normal bowel sounds, no masses, no organomegaly, soft        non-tender, non-distended, no guarding, no rebound                tenderness   Extremities:   Moves all extremities well, no edema, no cyanosis, no             redness   Pulses:   Pulses palpable and equal bilaterally   Skin:   No bleeding, bruising or rash       Results Review:   I reviewed the patient's new clinical results.     Assessment/Plan     1. Postoperative visit      Continue aspirin daily, follow-up in one year for carotid ultrasound.  Electronically signed by Nate Matthew MD  07/12/18    Portions of this note have been scribed for Nate Matthew MD by Michelle Milligan. 7/12/2018  12:16 PM

## 2018-07-31 ENCOUNTER — HOSPITAL ENCOUNTER (OUTPATIENT)
Facility: HOSPITAL | Age: 78
Discharge: HOME | End: 2018-07-31
Payer: MEDICARE

## 2018-07-31 LAB
ALBUMIN SERPL-MCNC: 4 G/DL (ref 3.4–4.8)
ANION GAP SERPL CALCULATED.3IONS-SCNC: 14 MMOL/L (ref 3–16)
BASOPHILS ABSOLUTE: 0 K/UL (ref 0–0.1)
BASOPHILS RELATIVE PERCENT: 0.3 %
BUN BLDV-MCNC: 32 MG/DL (ref 6–20)
CALCIUM SERPL-MCNC: 9.7 MG/DL (ref 8.5–10.5)
CHLORIDE BLD-SCNC: 102 MMOL/L (ref 98–107)
CO2: 24 MMOL/L (ref 20–30)
CREAT SERPL-MCNC: 1 MG/DL (ref 0.4–1.2)
EOSINOPHILS ABSOLUTE: 0.2 K/UL (ref 0–0.4)
EOSINOPHILS RELATIVE PERCENT: 3.3 %
GFR AFRICAN AMERICAN: >59
GFR NON-AFRICAN AMERICAN: 53
GLUCOSE BLD-MCNC: 189 MG/DL (ref 74–106)
HCT VFR BLD CALC: 32.1 % (ref 37–47)
HEMOGLOBIN: 10.1 G/DL (ref 11.5–16.5)
IMMATURE GRANULOCYTES #: 0 K/UL
IMMATURE GRANULOCYTES %: 0.3 % (ref 0–5)
LYMPHOCYTES ABSOLUTE: 1.6 K/UL (ref 1.5–4)
LYMPHOCYTES RELATIVE PERCENT: 24.7 %
MCH RBC QN AUTO: 30.7 PG (ref 27–32)
MCHC RBC AUTO-ENTMCNC: 31.5 G/DL (ref 31–35)
MCV RBC AUTO: 97.6 FL (ref 80–100)
MONOCYTES ABSOLUTE: 0.6 K/UL (ref 0.2–0.8)
MONOCYTES RELATIVE PERCENT: 8.8 %
NEUTROPHILS ABSOLUTE: 4.1 K/UL (ref 2–7.5)
NEUTROPHILS RELATIVE PERCENT: 62.6 %
PARATHYROID HORMONE INTACT: 41.5 PG/ML (ref 14–72)
PDW BLD-RTO: 13 % (ref 11–16)
PHOSPHORUS: 4.7 MG/DL (ref 2.5–4.5)
PLATELET # BLD: 242 K/UL (ref 150–400)
PMV BLD AUTO: 10.2 FL (ref 6–10)
POTASSIUM SERPL-SCNC: 5 MMOL/L (ref 3.4–5.1)
RBC # BLD: 3.29 M/UL (ref 3.8–5.8)
SODIUM BLD-SCNC: 140 MMOL/L (ref 136–145)
VITAMIN D 25-HYDROXY: 18.3 (ref 32–100)
WBC # BLD: 6.6 K/UL (ref 4–11)

## 2018-07-31 PROCEDURE — 80069 RENAL FUNCTION PANEL: CPT

## 2018-07-31 PROCEDURE — 83970 ASSAY OF PARATHORMONE: CPT

## 2018-07-31 PROCEDURE — 36415 COLL VENOUS BLD VENIPUNCTURE: CPT

## 2018-07-31 PROCEDURE — 82306 VITAMIN D 25 HYDROXY: CPT

## 2018-07-31 PROCEDURE — 85025 COMPLETE CBC W/AUTO DIFF WBC: CPT

## 2019-01-05 ENCOUNTER — HOSPITAL ENCOUNTER (EMERGENCY)
Facility: HOSPITAL | Age: 79
Discharge: HOME | End: 2019-01-05
Attending: FAMILY MEDICINE
Payer: MEDICARE

## 2019-01-05 ENCOUNTER — APPOINTMENT (OUTPATIENT)
Dept: GENERAL RADIOLOGY | Facility: HOSPITAL | Age: 79
End: 2019-01-05
Payer: MEDICARE

## 2019-01-05 VITALS
BODY MASS INDEX: 35.34 KG/M2 | TEMPERATURE: 98.7 F | HEART RATE: 68 BPM | SYSTOLIC BLOOD PRESSURE: 139 MMHG | RESPIRATION RATE: 16 BRPM | OXYGEN SATURATION: 97 % | HEIGHT: 60 IN | WEIGHT: 180 LBS | DIASTOLIC BLOOD PRESSURE: 43 MMHG

## 2019-01-05 DIAGNOSIS — R07.9 CHEST PAIN, UNSPECIFIED TYPE: Primary | ICD-10-CM

## 2019-01-05 LAB
A/G RATIO: 1.3 (ref 0.8–2)
ALBUMIN SERPL-MCNC: 3.9 G/DL (ref 3.4–4.8)
ALP BLD-CCNC: 115 U/L (ref 25–100)
ALT SERPL-CCNC: 16 U/L (ref 4–36)
ANION GAP SERPL CALCULATED.3IONS-SCNC: 10 MMOL/L (ref 3–16)
AST SERPL-CCNC: 19 U/L (ref 8–33)
BASOPHILS ABSOLUTE: 0 K/UL (ref 0–0.1)
BASOPHILS RELATIVE PERCENT: 0.4 %
BILIRUB SERPL-MCNC: 1 MG/DL (ref 0.3–1.2)
BUN BLDV-MCNC: 28 MG/DL (ref 6–20)
CALCIUM SERPL-MCNC: 9.5 MG/DL (ref 8.5–10.5)
CHLORIDE BLD-SCNC: 102 MMOL/L (ref 98–107)
CO2: 27 MMOL/L (ref 20–30)
CREAT SERPL-MCNC: 1 MG/DL (ref 0.4–1.2)
EOSINOPHILS ABSOLUTE: 0.3 K/UL (ref 0–0.4)
EOSINOPHILS RELATIVE PERCENT: 2.7 %
GFR AFRICAN AMERICAN: >59
GFR NON-AFRICAN AMERICAN: 53
GLOBULIN: 3.1 G/DL
GLUCOSE BLD-MCNC: 175 MG/DL (ref 74–106)
HCT VFR BLD CALC: 33.3 % (ref 37–47)
HEMOGLOBIN: 11 G/DL (ref 11.5–16.5)
IMMATURE GRANULOCYTES #: 0 K/UL
IMMATURE GRANULOCYTES %: 0.2 % (ref 0–5)
LYMPHOCYTES ABSOLUTE: 2.1 K/UL (ref 1.5–4)
LYMPHOCYTES RELATIVE PERCENT: 22.8 %
MCH RBC QN AUTO: 31.5 PG (ref 27–32)
MCHC RBC AUTO-ENTMCNC: 33 G/DL (ref 31–35)
MCV RBC AUTO: 95.4 FL (ref 80–100)
MONOCYTES ABSOLUTE: 0.7 K/UL (ref 0.2–0.8)
MONOCYTES RELATIVE PERCENT: 7.6 %
NEUTROPHILS ABSOLUTE: 6.1 K/UL (ref 2–7.5)
NEUTROPHILS RELATIVE PERCENT: 66.3 %
PDW BLD-RTO: 12.1 % (ref 11–16)
PLATELET # BLD: 245 K/UL (ref 150–400)
PMV BLD AUTO: 10.3 FL (ref 6–10)
POTASSIUM SERPL-SCNC: 4.8 MMOL/L (ref 3.4–5.1)
RBC # BLD: 3.49 M/UL (ref 3.8–5.8)
SODIUM BLD-SCNC: 139 MMOL/L (ref 136–145)
TOTAL PROTEIN: 7 G/DL (ref 6.4–8.3)
TROPONIN: <0.3 NG/ML
WBC # BLD: 9.2 K/UL (ref 4–11)

## 2019-01-05 PROCEDURE — 36415 COLL VENOUS BLD VENIPUNCTURE: CPT

## 2019-01-05 PROCEDURE — 71045 X-RAY EXAM CHEST 1 VIEW: CPT

## 2019-01-05 PROCEDURE — 85025 COMPLETE CBC W/AUTO DIFF WBC: CPT

## 2019-01-05 PROCEDURE — 99285 EMERGENCY DEPT VISIT HI MDM: CPT

## 2019-01-05 PROCEDURE — 80053 COMPREHEN METABOLIC PANEL: CPT

## 2019-01-05 PROCEDURE — 93005 ELECTROCARDIOGRAM TRACING: CPT

## 2019-01-05 PROCEDURE — 84484 ASSAY OF TROPONIN QUANT: CPT

## 2019-01-05 RX ORDER — NITROGLYCERIN 0.4 MG/1
TABLET SUBLINGUAL
Qty: 25 TABLET | Refills: 0 | Status: SHIPPED | OUTPATIENT
Start: 2019-01-05

## 2019-01-05 RX ORDER — 0.9 % SODIUM CHLORIDE 0.9 %
10 VIAL (ML) INJECTION DAILY
Status: DISCONTINUED | OUTPATIENT
Start: 2019-01-05 | End: 2019-01-05 | Stop reason: HOSPADM

## 2019-01-05 RX ORDER — PANTOPRAZOLE SODIUM 40 MG/10ML
40 INJECTION, POWDER, LYOPHILIZED, FOR SOLUTION INTRAVENOUS DAILY
Status: DISCONTINUED | OUTPATIENT
Start: 2019-01-05 | End: 2019-01-05

## 2019-01-05 ASSESSMENT — PAIN SCALES - GENERAL: PAINLEVEL_OUTOF10: 9

## 2019-01-05 ASSESSMENT — PAIN DESCRIPTION - DESCRIPTORS: DESCRIPTORS: SHARP

## 2019-01-05 ASSESSMENT — HEART SCORE: ECG: 0

## 2019-01-05 ASSESSMENT — PAIN DESCRIPTION - ORIENTATION: ORIENTATION: LEFT

## 2019-01-05 ASSESSMENT — PAIN DESCRIPTION - PROGRESSION: CLINICAL_PROGRESSION: GRADUALLY WORSENING

## 2019-01-05 ASSESSMENT — PAIN DESCRIPTION - LOCATION: LOCATION: CHEST

## 2019-01-05 ASSESSMENT — ENCOUNTER SYMPTOMS: NAUSEA: 1

## 2019-01-05 ASSESSMENT — PAIN DESCRIPTION - PAIN TYPE: TYPE: ACUTE PAIN

## 2019-01-05 ASSESSMENT — PAIN DESCRIPTION - ONSET: ONSET: ON-GOING

## 2019-01-05 ASSESSMENT — PAIN DESCRIPTION - FREQUENCY: FREQUENCY: INTERMITTENT

## 2019-02-13 ENCOUNTER — TRANSCRIBE ORDERS (OUTPATIENT)
Dept: ADMINISTRATIVE | Facility: HOSPITAL | Age: 79
End: 2019-02-13

## 2019-02-13 DIAGNOSIS — Z12.39 SCREENING BREAST EXAMINATION: Primary | ICD-10-CM

## 2019-03-27 ENCOUNTER — HOSPITAL ENCOUNTER (OUTPATIENT)
Dept: MAMMOGRAPHY | Facility: HOSPITAL | Age: 79
Discharge: HOME | End: 2019-03-27
Admitting: INTERNAL MEDICINE

## 2019-03-27 DIAGNOSIS — Z12.39 SCREENING BREAST EXAMINATION: ICD-10-CM

## 2019-03-27 PROCEDURE — 77067 SCR MAMMO BI INCL CAD: CPT

## 2019-03-27 PROCEDURE — 77063 BREAST TOMOSYNTHESIS BI: CPT

## 2019-05-27 ENCOUNTER — APPOINTMENT (OUTPATIENT)
Dept: GENERAL RADIOLOGY | Facility: HOSPITAL | Age: 79
End: 2019-05-27
Payer: MEDICARE

## 2019-05-27 ENCOUNTER — HOSPITAL ENCOUNTER (EMERGENCY)
Facility: HOSPITAL | Age: 79
Discharge: HOME | End: 2019-05-27
Attending: HOSPITALIST
Payer: MEDICARE

## 2019-05-27 VITALS
SYSTOLIC BLOOD PRESSURE: 132 MMHG | DIASTOLIC BLOOD PRESSURE: 88 MMHG | HEIGHT: 65 IN | TEMPERATURE: 99 F | OXYGEN SATURATION: 97 % | RESPIRATION RATE: 16 BRPM | WEIGHT: 203 LBS | BODY MASS INDEX: 33.82 KG/M2 | HEART RATE: 72 BPM

## 2019-05-27 DIAGNOSIS — R11.0 NAUSEA: ICD-10-CM

## 2019-05-27 DIAGNOSIS — E86.0 DEHYDRATION: Primary | ICD-10-CM

## 2019-05-27 DIAGNOSIS — R53.83 FATIGUE, UNSPECIFIED TYPE: ICD-10-CM

## 2019-05-27 DIAGNOSIS — N30.00 ACUTE CYSTITIS WITHOUT HEMATURIA: ICD-10-CM

## 2019-05-27 LAB
A/G RATIO: 1.4 (ref 0.8–2)
ALBUMIN SERPL-MCNC: 3.9 G/DL (ref 3.4–4.8)
ALP BLD-CCNC: 47 U/L (ref 25–100)
ALT SERPL-CCNC: 15 U/L (ref 4–36)
ANION GAP SERPL CALCULATED.3IONS-SCNC: 13 MMOL/L (ref 3–16)
AST SERPL-CCNC: 20 U/L (ref 8–33)
BACTERIA: ABNORMAL /HPF
BASOPHILS ABSOLUTE: 0 K/UL (ref 0–0.1)
BASOPHILS RELATIVE PERCENT: 0.3 %
BILIRUB SERPL-MCNC: 0.4 MG/DL (ref 0.3–1.2)
BILIRUBIN URINE: ABNORMAL
BLOOD, URINE: NEGATIVE
BUN BLDV-MCNC: 46 MG/DL (ref 6–20)
CALCIUM SERPL-MCNC: 9.1 MG/DL (ref 8.5–10.5)
CHLORIDE BLD-SCNC: 100 MMOL/L (ref 98–107)
CLARITY: CLEAR
CO2: 24 MMOL/L (ref 20–30)
COLOR: YELLOW
CREAT SERPL-MCNC: 1.7 MG/DL (ref 0.4–1.2)
EOSINOPHILS ABSOLUTE: 0.1 K/UL (ref 0–0.4)
EOSINOPHILS RELATIVE PERCENT: 1.6 %
EPITHELIAL CELLS, UA: ABNORMAL /HPF
GFR AFRICAN AMERICAN: 35
GFR NON-AFRICAN AMERICAN: 29
GLOBULIN: 2.8 G/DL
GLUCOSE BLD-MCNC: 276 MG/DL (ref 74–106)
GLUCOSE URINE: NEGATIVE MG/DL
HCT VFR BLD CALC: 34.6 % (ref 37–47)
HEMOGLOBIN: 11.2 G/DL (ref 11.5–16.5)
IMMATURE GRANULOCYTES #: 0 K/UL
IMMATURE GRANULOCYTES %: 0.3 % (ref 0–5)
KETONES, URINE: NEGATIVE MG/DL
LEUKOCYTE ESTERASE, URINE: ABNORMAL
LIPASE: 16 U/L (ref 5.6–51.3)
LYMPHOCYTES ABSOLUTE: 2.3 K/UL (ref 1.5–4)
LYMPHOCYTES RELATIVE PERCENT: 29.1 %
MCH RBC QN AUTO: 31.5 PG (ref 27–32)
MCHC RBC AUTO-ENTMCNC: 32.4 G/DL (ref 31–35)
MCV RBC AUTO: 97.5 FL (ref 80–100)
MICROSCOPIC EXAMINATION: YES
MONOCYTES ABSOLUTE: 0.5 K/UL (ref 0.2–0.8)
MONOCYTES RELATIVE PERCENT: 6.8 %
MUCUS: ABNORMAL /LPF
NEUTROPHILS ABSOLUTE: 4.9 K/UL (ref 2–7.5)
NEUTROPHILS RELATIVE PERCENT: 61.9 %
NITRITE, URINE: NEGATIVE
PDW BLD-RTO: 12.4 % (ref 11–16)
PH UA: 5 (ref 5–8)
PLATELET # BLD: 258 K/UL (ref 150–400)
PMV BLD AUTO: 10.7 FL (ref 6–10)
POTASSIUM REFLEX MAGNESIUM: 4.5 MMOL/L (ref 3.4–5.1)
PROTEIN UA: NEGATIVE MG/DL
RBC # BLD: 3.55 M/UL (ref 3.8–5.8)
RBC UA: ABNORMAL /HPF (ref 0–2)
SODIUM BLD-SCNC: 137 MMOL/L (ref 136–145)
SPECIFIC GRAVITY UA: >=1.03 (ref 1–1.03)
TOTAL PROTEIN: 6.7 G/DL (ref 6.4–8.3)
URINE REFLEX TO CULTURE: YES
URINE TYPE: ABNORMAL
UROBILINOGEN, URINE: 0.2 E.U./DL
WBC # BLD: 8 K/UL (ref 4–11)
WBC UA: ABNORMAL /HPF (ref 0–5)

## 2019-05-27 PROCEDURE — 81001 URINALYSIS AUTO W/SCOPE: CPT

## 2019-05-27 PROCEDURE — 71101 X-RAY EXAM UNILAT RIBS/CHEST: CPT

## 2019-05-27 PROCEDURE — 96360 HYDRATION IV INFUSION INIT: CPT

## 2019-05-27 PROCEDURE — 83690 ASSAY OF LIPASE: CPT

## 2019-05-27 PROCEDURE — 80053 COMPREHEN METABOLIC PANEL: CPT

## 2019-05-27 PROCEDURE — 99284 EMERGENCY DEPT VISIT MOD MDM: CPT

## 2019-05-27 PROCEDURE — 85025 COMPLETE CBC W/AUTO DIFF WBC: CPT

## 2019-05-27 PROCEDURE — 93005 ELECTROCARDIOGRAM TRACING: CPT

## 2019-05-27 PROCEDURE — 87086 URINE CULTURE/COLONY COUNT: CPT

## 2019-05-27 PROCEDURE — 2580000003 HC RX 258: Performed by: HOSPITALIST

## 2019-05-27 RX ORDER — 0.9 % SODIUM CHLORIDE 0.9 %
1000 INTRAVENOUS SOLUTION INTRAVENOUS ONCE
Status: COMPLETED | OUTPATIENT
Start: 2019-05-27 | End: 2019-05-27

## 2019-05-27 RX ORDER — NITROFURANTOIN 25; 75 MG/1; MG/1
100 CAPSULE ORAL 2 TIMES DAILY
Qty: 20 CAPSULE | Refills: 0 | Status: SHIPPED | OUTPATIENT
Start: 2019-05-27 | End: 2019-06-06

## 2019-05-27 RX ORDER — ONDANSETRON 4 MG/1
4 TABLET, ORALLY DISINTEGRATING ORAL EVERY 8 HOURS PRN
Qty: 15 TABLET | Refills: 0 | Status: SHIPPED | OUTPATIENT
Start: 2019-05-27

## 2019-05-27 RX ADMIN — SODIUM CHLORIDE 1000 ML: 9 INJECTION, SOLUTION INTRAVENOUS at 17:06

## 2019-05-27 NOTE — ED PROVIDER NOTES
[address] ENCOUNTER      Pt Name: Anisa Alicia  MRN: [id number]  YOB: 1940  Date of evaluation: 5/27/2019  [facility]       Chief Complaint   Patient presents with    Abdominal Pain    Fatigue    Shortness of Breath    Nausea     no vomiting         HISTORY OF PRESENT ILLNESS  (Location/Symptom, Timing/Onset, Context/Setting, Quality, Duration, Modifying Factors, Severity.)   Anisa  is a 77 y.o. female who presents to the emergency department for multiple complaints. Patient states that she cleaned her house and had some activity about 2 weeks ago which was more than what she usually does and since then she's been complaining of pain to the right back/ribs area. Patient advised nursing staff that she was having abdominal pain but she declined that with me R she states she has had some mild nausea but no vomiting. Patient states that she's been fatigued and weak for several weeks. She also states that she's had intermittent pain around her heart. States that she's been short of breath with these symptoms. She denies any diaphoresis. Patient states the only thing that really going on today while she was resting on the stretcher was just the pain in her back. Denies any numbness tingling weakness of extremities. Denies any trauma or injury. Patient was just concerned because she had had pancreatitis before in the past and was concerned that this could possibly be a flare of this. Denies any sick contacts. Patient does admit to some mild burning with urination but denies any hematuria. Nursing notes were reviewed.     REVIEW OFSYSTEMS    (2-9 systems for level 4, 10 or more for level 5)   ROS:  General:  No fevers, no chills, +weakness/fatigue  Cardiovascular:  No chest pain, no palpitations  Respiratory:  +shortness of breath, no cough, no wheezing  Gastrointestinal:  No pain, + nausea, no vomiting, no diarrhea  Musculoskeletal:  +back/rib pain on right, no joint pain  Skin:  No rash, no easy bruising  Neurologic:  No speech problems, no headache, no extremity weakness  Psychiatric:  No anxiety  Genitourinary:  +dysuria, no hematuria    Except as noted above the remainder of the review of systems was reviewed and negative. PAST MEDICAL HISTORY     Past Medical History:   Diagnosis Date    Arthritis     CAD (coronary artery disease)     Chronic kidney disease     ? low functioning per pt    Diabetes mellitus ([address]     Hyperlipidemia     Hypertension     Thyroid disease          SURGICAL HISTORY       Past Surgical History:   Procedure Laterality Date    CARDIAC SURGERY      3 stents in  2018     SECTION      CHOLECYSTECTOMY      COLONOSCOPY      HYSTERECTOMY           CURRENT MEDICATIONS       Previous Medications    ASPIRIN 81 MG CHEWABLE TABLET    Take 81 mg by mouth daily. CALCIUM CARB-CHOLECALCIFEROL (CALCIUM+D3 PO)    Take by mouth daily    CARVEDILOL (COREG) 25 MG TABLET    Take 25 mg by mouth 2 times daily (with meals). FLUTICASONE (FLONASE) 50 MCG/ACT NASAL SPRAY    1 spray by Nasal route daily Each nostril daily    INSULIN GLARGINE (LANTUS SC)    Inject 50 Units into the skin daily. LEVOTHYROXINE (SYNTHROID) 75 MCG TABLET    Take 75 mcg by mouth Daily. LORATADINE (CLARITIN) 10 MG TABLET    Take 10 mg by mouth daily    LOSARTAN (COZAAR) 100 MG TABLET    Take 100 mg by mouth daily. MOMETASONE-FORMOTEROL (DULERA) 200-5 MCG/ACT INHALER    Inhale 2 puffs into the lungs 2 times daily    MONTELUKAST (SINGULAIR) 10 MG TABLET    Take 10 mg by mouth nightly. NAPROXEN (NAPROSYN) 500 MG TABLET    Take 1 tablet by mouth 2 times daily as needed for Pain    NITROGLYCERIN (NITROSTAT) 0.4 MG SL TABLET    Take with onset of chest pain, up to max of 3 total doses 5 min apart.  If no relief after 3 doses, call 911 or go to the Emergency Room OXYCODONE-ACETAMINOPHEN (PERCOCET) 7.5-325 MG PER TABLET    Take 1 tablet by mouth daily. Thais Martinez RANITIDINE (ZANTAC) 150 MG TABLET    Take 1 tablet by mouth 2 times daily    ROPINIROLE (REQUIP) 2 MG TABLET    Take 2 mg by mouth 2 times daily. SIMVASTATIN (ZOCOR) 10 MG TABLET    Take 10 mg by mouth nightly. ALLERGIES     Patient has no known allergies. FAMILY HISTORY     History reviewed. No pertinent family history.        SOCIAL HISTORY       Social History     Socioeconomic History    Marital status:      Spouse name: None    Number of children: None    Years of education: None    Highest education level: None   Occupational History    None   Social Needs    Financial resource strain: None    Food insecurity:     Worry: None     Inability: None    Transportation needs:     Medical: None     Non-medical: None   Tobacco Use    Smoking status: Former Smoker    Smokeless tobacco: Never Used   Substance and Sexual Activity    Alcohol use: No    Drug use: No    Sexual activity: Never   Lifestyle    Physical activity:     Days per week: None     Minutes per session: None    Stress: None   Relationships    Social connections:     Talks on phone: None     Gets together: None     Attends [employer] service: None     Active member of club or organization: None     Attends meetings of clubs or organizations: None     Relationship status: None    Intimate partner violence:     Fear of current or ex partner: None     Emotionally abused: None     Physically abused: None     Forced sexual activity: None   Other Topics Concern    None   Social History Narrative    None         PHYSICAL EXAM    (up to 7 for level 4, 8 or more for level 5)     ED Triage Vitals [05/27/19 1355]   BP Temp Temp Source Pulse Resp SpO2 Height Weight   (!) 121/42 99 °F (37.2 °C) Oral 70 18 97 % 5' 5\" (1.651 m) 203 lb (92.1 kg)       Physical Exam  General :Patient is awake, alert, oriented, in no acute distress, nontoxic images are visualized and preliminarily interpreted by the emergency physician with the below findings:      ? Radiologist's Report Reviewed:  XR RIBS RIGHT INCLUDE CHEST (MIN 3 VIEWS)   Final Result   Impression:   1. No evidence of rib fracture.             ED BEDSIDE ULTRASOUND:   Performed by ED Physician - none    LABS:    I have reviewed and interpreted all of the currently available lab results from this visit (ifapplicable):  Results for orders placed or performed during the hospital encounter of 05/27/19   Urine Reflex to Culture   Result Value Ref Range    Color, UA Yellow Straw/Yellow    Clarity, UA Clear Clear    Glucose, Ur Negative Negative mg/dL    Bilirubin Urine SMALL (A) Negative    Ketones, Urine Negative Negative mg/dL    Specific Gravity, UA >=1.030 1.005 - 1.030    Blood, Urine Negative Negative    pH, UA 5.0 5.0 - 8.0    Protein, UA Negative Negative mg/dL    Urobilinogen, Urine 0.2 <2.0 E.U./dL    Nitrite, Urine Negative Negative    Leukocyte Esterase, Urine TRACE (A) Negative    Microscopic Examination YES     Urine Reflex to Culture Yes     Urine Type Voided    Microscopic Urinalysis   Result Value Ref Range    Mucus, UA 1+ (A) /LPF    WBC, UA 6-10 (A) 0 - 5 /HPF    RBC, UA None seen 0 - 2 /HPF    Epi Cells 5-10 /HPF    Bacteria, UA Rare (A) /HPF   CBC Auto Differential   Result Value Ref Range    WBC 8.0 4.0 - 11.0 K/uL    RBC 3.55 (L) 3.80 - 5.80 M/uL    Hemoglobin 11.2 (L) 11.5 - 16.5 g/dL    Hematocrit 34.6 (L) 37.0 - 47.0 %    MCV 97.5 80.0 - 100.0 fL    MCH 31.5 27.0 - 32.0 pg    MCHC 32.4 31.0 - 35.0 g/dL    RDW 12.4 11.0 - 16.0 %    Platelets 828 806 - 412 K/uL    MPV 10.7 (H) 6.0 - 10.0 fL    Neutrophils % 61.9 %    Immature Granulocytes % 0.3 0.0 - 5.0 %    Lymphocytes % 29.1 %    Monocytes % 6.8 %    Eosinophils % 1.6 %    Basophils % 0.3 %    Neutrophils # 4.9 2.0 - 7.5 K/uL    Immature Granulocytes # 0.0 K/uL    Lymphocytes # 2.3 1.5 - 4.0 K/uL    Monocytes # 0.5 0.2 - 0.8 K/uL Eosinophils # 0.1 0.0 - 0.4 K/uL    Basophils # 0.0 0.0 - 0.1 K/uL   Comprehensive Metabolic Panel w/ Reflex to MG   Result Value Ref Range    Sodium 137 136 - 145 mmol/L    Potassium reflex Magnesium 4.5 3.4 - 5.1 mmol/L    Chloride 100 98 - 107 mmol/L    CO2 24 20 - 30 mmol/L    Anion Gap 13 3 - 16    Glucose 276 (H) 74 - 106 mg/dL    BUN 46 (H) 6 - 20 mg/dL    CREATININE 1.7 (H) 0.4 - 1.2 mg/dL    GFR Non-African American 29 (L) >59    GFR  35 (L) >59    Calcium 9.1 8.5 - 10.5 mg/dL    Total Protein 6.7 6.4 - 8.3 g/dL    Alb 3.9 3.4 - 4.8 g/dL    Albumin/Globulin Ratio 1.4 0.8 - 2.0    Total Bilirubin 0.4 0.3 - 1.2 mg/dL    Alkaline Phosphatase 47 25 - 100 U/L    ALT 15 4 - 36 U/L    AST 20 8 - 33 U/L    Globulin 2.8 g/dL   Lipase   Result Value Ref Range    Lipase 16.0 5.6 - 51.3 U/L        All other labs were within normal range or not returned as of this dictation. EMERGENCY DEPARTMENT COURSE and DIFFERENTIAL DIAGNOSIS/MDM:   Vitals:    Vitals:    05/27/19 1400 05/27/19 1415 05/27/19 1430 05/27/19 1445   BP: (!) 121/42 (!) 121/42     Pulse: 68 65 68 67   Resp:       Temp:       TempSrc:       SpO2: 97% 97% 95% 97%   Weight:       Height:           MEDICATIONS ADMINISTERED IN ED:  Medications   0.9 % sodium chloride bolus (1,000 mLs Intravenous New Bag 5/27/19 1706)       After initial evaluation and examination I did have a conversation with the patient about the upcoming plan, treatment and possible disposition advised them on Abilify and a reason for her weakness and fatigue vitals are very vague symptoms that can be per tray by many different things. Patient denies that we would perform a radiograph of the right ribs with chest to rule out any acute fracture since she does have posterior rib pain and some mild shortness of breath. Advised we'll perform a CBC, CMP, lipase, UA and twelve-lead EKG. Patient has been ambulating to the room and around the room without any difficulty.  She is resting comfortably on the bed no acute distress. I'll disposition be determined once her radiological studies been performed and reviewed. Blood work showed white count 8000, hemoglobin is 11.2, hematocrit 34.6, platelet counts 862. Pregnancy metabolic panel was benign except for glucose of 276, BUN of 36 creatinine 1.7. Lipase was 16    UA showed small amount of bilirubin and trace leukoesterase. Microscopy showed 6-10 white cells, no red cells seen. 5-10 epithelial cells. Rare bacteria. Right rib series with chest read by radiology as no evidence of rib fracture. Radiological diagnostic studies were discussed with her she does state her understanding. Patient advised her laboratory studies show that she is dehydrated with a BUN of 48 and creatinine 1.7 with a previous finding of 1.0 for creatinine and 28 for her BUN. Patient advises that she's not been eating or drinking O last several days she didn't really hasn't had much of an appetite. Advised that she probably needs to have some fluid resuscitation will give her a liter of fluids here. I did discuss with the patient about possible admission to the hospital but she declined she states she is not staying she would prefer to go home. Patient advised me that she will increase her fluid intake over the next several days. Is also concern for mild urinary tract infection that the patient can be treated for with antibiotics. Patient advised that culture results will take at least 3-5 days to return. They will check the culture results against the antibiotic at time of discharge from this paperwork to see if any change in her antibody coverage as needed. Patient was informed that if she does not hear anything within the next 3-5 days and discontinued taking antibiotic as prescribed. Patient be discharged home per her request after fluid bolus of 1 L normal saline. Patient be placed on Macrobid for her possible urinary tract infection.     The patient will follow-up with their PCP in 1-2 days for reevaluation. Patient advised of her symptoms worsens or new symptoms arise she should return back to emergency department for further evaluation workup. CONSULTS:  None    PROCEDURES:  Procedures    CRITICAL CARE TIME    Total Critical Care time was 0 minutes, excluding separately reportable procedures. There was a high probability of clinically significant/life threatening deterioration in the patient's condition which required my urgent intervention. FINAL IMPRESSION      1. Dehydration    2. Nausea    3. Acute cystitis without hematuria    4. Fatigue, unspecified type          DISPOSITION/PLAN   DISPOSITION        PATIENT REFERRED TO:  Jimmy Rangel  [phone]    In 2 days      [facility] Emergency Department  [facility] [address] [facility]  [phone]    As needed, If symptoms worsen      DISCHARGE MEDICATIONS:  New Prescriptions    NITROFURANTOIN, MACROCRYSTAL-MONOHYDRATE, (MACROBID) 100 MG CAPSULE    Take 1 capsule by mouth 2 times daily for 10 days    ONDANSETRON (ZOFRAN ODT) 4 MG DISINTEGRATING TABLET    Take 1 tablet by mouth every 8 hours as needed for Nausea or Vomiting       Comment: Please note this report has been produced using speech recognition software and may contain errorsrelated to that system including errors in grammar, punctuation, and spelling, as well as words and phrases that may be inappropriate. If there are any questions or concerns please feel free to contact the dictating providerfor clarification.     Tonya Perry DO  Attending Emergency Physician              Tonya Perry DO  05/27/19 5577

## 2019-05-29 LAB — URINE CULTURE, ROUTINE: NORMAL

## 2019-09-09 ENCOUNTER — OFFICE VISIT (OUTPATIENT)
Dept: SURGERY | Facility: CLINIC | Age: 79
End: 2019-09-09

## 2019-09-09 VITALS
TEMPERATURE: 98 F | DIASTOLIC BLOOD PRESSURE: 66 MMHG | HEIGHT: 64 IN | BODY MASS INDEX: 36.12 KG/M2 | OXYGEN SATURATION: 98 % | SYSTOLIC BLOOD PRESSURE: 140 MMHG | HEART RATE: 83 BPM | WEIGHT: 211.6 LBS

## 2019-09-09 DIAGNOSIS — I65.29 STENOSIS OF CAROTID ARTERY, UNSPECIFIED LATERALITY: Primary | ICD-10-CM

## 2019-09-09 DIAGNOSIS — I65.23 OCCLUSION AND STENOSIS OF BILATERAL CAROTID ARTERIES: ICD-10-CM

## 2019-09-09 PROCEDURE — 99212 OFFICE O/P EST SF 10 MIN: CPT | Performed by: SURGERY

## 2019-09-09 RX ORDER — FOLIC ACID 1 MG/1
1 TABLET ORAL DAILY
COMMUNITY

## 2019-09-09 RX ORDER — FUROSEMIDE 20 MG/1
20 TABLET ORAL 2 TIMES DAILY
COMMUNITY

## 2019-09-09 RX ORDER — LORATADINE 10 MG/1
10 TABLET ORAL DAILY
COMMUNITY

## 2019-09-09 RX ORDER — FERROUS SULFATE 325(65) MG
325 TABLET ORAL
COMMUNITY

## 2019-09-09 RX ORDER — LANOLIN ALCOHOL/MO/W.PET/CERES
1000 CREAM (GRAM) TOPICAL DAILY
COMMUNITY

## 2019-09-09 RX ORDER — DOCUSATE SODIUM 100 MG/1
100 CAPSULE, LIQUID FILLED ORAL 2 TIMES DAILY
COMMUNITY

## 2019-09-09 RX ORDER — RANITIDINE 150 MG/1
150 TABLET ORAL 2 TIMES DAILY
COMMUNITY

## 2019-09-09 RX ORDER — FLUTICASONE PROPIONATE 50 MCG
2 SPRAY, SUSPENSION (ML) NASAL DAILY
COMMUNITY

## 2019-09-09 RX ORDER — AMITRIPTYLINE HYDROCHLORIDE 10 MG/1
10 TABLET, FILM COATED ORAL NIGHTLY
COMMUNITY

## 2019-09-09 NOTE — PROGRESS NOTES
Patient: Dai Murphy    YOB: 1940    Date: 09/09/2019    Primary Care Provider: Buck Stern MD    Chief Complaint   Patient presents with   • Carotid Artery Disease       SUBJECTIVE:    History of present illness:  I saw the patient in the office today for the evaluation and treatment for carotid stenosis.  The patient did undergo a right CEA in July of 2018.  Patient denies weakness, dizziness, visual disturbances, slurred speech or memory loss.  Patient doing well, takes an aspirin daily.    The following portions of the patient's history were reviewed and updated as appropriate: allergies, current medications, past family history, past medical history, past social history, past surgical history and problem list.       Review of Systems   Constitutional: Negative for chills, fever and unexpected weight change.   HENT: Negative for hearing loss, trouble swallowing and voice change.    Eyes: Negative for visual disturbance.   Respiratory: Negative for apnea, cough, chest tightness, shortness of breath and wheezing.    Cardiovascular: Negative for chest pain, palpitations and leg swelling.   Gastrointestinal: Negative for abdominal distention, abdominal pain, anal bleeding, blood in stool, constipation, diarrhea, nausea, rectal pain and vomiting.   Endocrine: Negative for cold intolerance and heat intolerance.   Genitourinary: Negative for difficulty urinating, dysuria and flank pain.   Musculoskeletal: Negative for back pain and gait problem.   Skin: Negative for color change, rash and wound.   Neurological: Negative for dizziness, syncope, speech difficulty, weakness, light-headedness, numbness and headaches.   Hematological: Negative for adenopathy. Does not bruise/bleed easily.   Psychiatric/Behavioral: Negative for confusion. The patient is not nervous/anxious.        Allergies:  No Known Allergies    Medications:    Current Outpatient Medications:   •  amitriptyline (ELAVIL) 10 MG  tablet, Take 10 mg by mouth Every Night., Disp: , Rfl:   •  amLODIPine (NORVASC) 5 MG tablet, Take 5 mg by mouth Daily., Disp: , Rfl:   •  aspirin 81 MG chewable tablet, Take 81 mg by mouth daily., Disp: , Rfl:   •  atorvastatin (LIPITOR) 80 MG tablet, Take 1 tablet by mouth Every Night., Disp: 90 tablet, Rfl: 3  •  calcium carbonate (OS-TONYA) 600 MG tablet, Take 600 mg by mouth Daily., Disp: , Rfl:   •  carvedilol (COREG) 25 MG tablet, Take 25 mg by mouth 2 times daily (with meals)., Disp: , Rfl:   •  clopidogrel (PLAVIX) 75 MG tablet, Take 1 tablet by mouth Daily., Disp: 90 tablet, Rfl: 3  •  docusate sodium (COLACE) 100 MG capsule, Take 100 mg by mouth 2 (Two) Times a Day., Disp: , Rfl:   •  ferrous sulfate 325 (65 FE) MG tablet, Take 325 mg by mouth Daily With Breakfast., Disp: , Rfl:   •  fluticasone (FLONASE) 50 MCG/ACT nasal spray, 2 sprays into the nostril(s) as directed by provider Daily., Disp: , Rfl:   •  folic acid (FOLVITE) 1 MG tablet, Take 1 mg by mouth Daily., Disp: , Rfl:   •  furosemide (LASIX) 20 MG tablet, Take 20 mg by mouth 2 (Two) Times a Day., Disp: , Rfl:   •  insulin glargine (LANTUS) 100 UNIT/ML injection, Inject 70 Units under the skin Daily., Disp: , Rfl:   •  insulin lispro (humaLOG) 100 UNIT/ML injection, Inject  under the skin into the appropriate area as directed 3 (Three) Times a Day Before Meals., Disp: , Rfl:   •  levothyroxine (SYNTHROID, LEVOTHROID) 75 MCG tablet, Take 75 mcg by mouth Daily., Disp: , Rfl:   •  loratadine (CLARITIN) 10 MG tablet, Take 10 mg by mouth Daily., Disp: , Rfl:   •  losartan (COZAAR) 100 MG tablet, Take 100 mg by mouth daily., Disp: , Rfl:   •  mometasone-formoterol (DULERA 100) 100-5 MCG/ACT inhaler, Inhale 2 puffs 2 (Two) Times a Day., Disp: , Rfl:   •  montelukast (SINGULAIR) 10 MG tablet, Take 10 mg by mouth nightly., Disp: , Rfl:   •  oxyCODONE-acetaminophen (PERCOCET)  MG per tablet, Take 1 tablet by mouth Every 6 (Six) Hours As Needed for  Moderate Pain ., Disp: , Rfl:   •  raNITIdine (ZANTAC) 150 MG tablet, Take 150 mg by mouth 2 (Two) Times a Day., Disp: , Rfl:   •  vitamin B-12 (CYANOCOBALAMIN) 1000 MCG tablet, Take 1,000 mcg by mouth Daily., Disp: , Rfl:   •  metFORMIN (GLUCOPHAGE) 500 MG tablet, Take 500 mg by mouth 2 times daily (with meals)., Disp: , Rfl:   •  rOPINIRole (REQUIP) 2 MG tablet, Take 2 mg by mouth 2 times daily., Disp: , Rfl:     History:  Past Medical History:   Diagnosis Date   • Arthritis    • CAD (coronary artery disease)    • Cataracts, bilateral    • CHF (congestive heart failure) (CMS/HCC)    • Chronic kidney disease    • Constipation    • Diabetes mellitus (CMS/HCC)     TYPE 1   • Disease of thyroid gland     HYPOTHROIDISM   • Dysphagia    • Full dentures    • GERD (gastroesophageal reflux disease)     HISTORY OF NONE NOW   • Headache    • Hyperlipidemia    • Hypertension    • Stress incontinence    • Tinnitus    • Wears glasses     READING ONLY       Past Surgical History:   Procedure Laterality Date   • CARDIAC CATHETERIZATION N/A 6/11/2018    Procedure: Left Heart Cath;  Surgeon: Jason Cain MD;  Location: [facility] CATH INVASIVE LOCATION;  Service: Cardiovascular   • CARDIAC CATHETERIZATION N/A 6/11/2018    Procedure: Left ventriculography;  Surgeon: Jason Cain MD;  Location: [facility] CATH INVASIVE LOCATION;  Service: Cardiovascular   • CARDIAC CATHETERIZATION N/A 6/11/2018    Procedure: Coronary angiography;  Surgeon: Jason Cain MD;  Location: [facility] CATH INVASIVE LOCATION;  Service: Cardiovascular   • CARDIAC CATHETERIZATION N/A 6/11/2018    Procedure: Stent GUERITA coronary;  Surgeon: Jason Cain MD;  Location: [facility] CATH INVASIVE LOCATION;  Service: Cardiovascular   • CARDIAC CATHETERIZATION Bilateral 6/11/2018    Procedure: Functional Flow [place];  Surgeon: Jason Cain MD;  Location: [facility] CATH INVASIVE LOCATION;  Service: Cardiovascular   • CAROTID  "ENDARTERECTOMY Right 2018    Procedure: CAROTID ENDARTERECTOMY;  Surgeon: Nate Matthew MD;  Location: [facility] OR;  Service: Vascular   • CAROTID ENDARTERECTOMY Right 2018    Procedure: CAROTID ENDARTERECTOMY EXPLORATION AND EVACUATION OF HEMATOMA; LIGATION OF UN-NAMED  ARTERY;  Surgeon: Nate Matthew MD;  Location: [facility];  Service: Vascular   • CHOLECYSTECTOMY     • COLONOSCOPY     • CORONARY ANGIOPLASTY WITH STENT PLACEMENT      3 STENTS PLACED ON 2018   • MULTIPLE TOOTH EXTRACTIONS     • OOPHORECTOMY         Family History   Problem Relation Age of Onset   • Heart disease Mother    • No Known Problems Father        Social History     Tobacco Use   • Smoking status: Former Smoker     Types: Cigarettes     Last attempt to quit: 2/15/1968     Years since quittin.6   • Smokeless tobacco: Never Used   • Tobacco comment: QUIT 50 YEARS AGO   Substance Use Topics   • Alcohol use: No   • Drug use: No        OBJECTIVE:    Vital Signs:   Vitals:    19 1331   BP: 140/66   Pulse: 83   Temp: 98 °F (36.7 °C)   TempSrc: Temporal   SpO2: 98%   Weight: 96 kg (211 lb 9.6 oz)   Height: 162.6 cm (64\")       Physical Exam:   General Appearance:    Alert, cooperative, in no acute distress   Head:    Normocephalic, without obvious abnormality, atraumatic   Eyes:            Lids and lashes normal, conjunctivae and sclerae normal, no   icterus, no pallor, corneas clear, PERRLA   Ears:    Ears appear intact with no abnormalities noted   Throat:   No oral lesions, no thrush, oral mucosa moist   Neck:   No adenopathy, supple, trachea midline, no thyromegaly, bilateral carotid bruits, no JVD   Lungs:     Clear to auscultation,respirations regular, even and                  unlabored    Heart:    Regular rhythm and normal rate, normal S1 and S2, no            murmur, no gallop, no rub, no click   Chest Wall:    No abnormalities observed   Abdomen:     Normal bowel sounds, no masses, no organomegaly, soft       "  non-tender, non-distended, no guarding, no rebound                tenderness   Extremities:   Moves all extremities well, no edema, no cyanosis, no             redness   Pulses:   Pulses palpable and equal bilaterally   Skin:   No bleeding, bruising or rash   Lymph nodes:   No palpable adenopathy   Neurologic:   Cranial nerves 2 - 12 grossly intact, sensation intact, DTR       present and equal bilaterally     Results Review:   I reviewed the patient's new clinical results.    ASSESSMENT/PLAN:    1. Stenosis of carotid artery, unspecified laterality    2. Occlusion and stenosis of bilateral carotid arteries         Patient reassured, ultrasound today indicates slightly high velocity but no visible plaque and no significant stenosis.  Continue aspirin daily and follow-up in 1 year for repeat ultrasound    I discussed the patients findings and my recommendations with patient    Review of Systems was reviewed and confirmed as accurate today.    Electronically signed by Nate Matthew MD  09/09/19      .

## 2020-03-17 ENCOUNTER — TRANSCRIBE ORDERS (OUTPATIENT)
Dept: ADMINISTRATIVE | Facility: HOSPITAL | Age: 80
End: 2020-03-17

## 2020-03-17 DIAGNOSIS — Z12.31 BREAST CANCER SCREENING BY MAMMOGRAM: Primary | ICD-10-CM

## 2020-06-10 ENCOUNTER — TRANSCRIBE ORDERS (OUTPATIENT)
Dept: ADMINISTRATIVE | Facility: HOSPITAL | Age: 80
End: 2020-06-10

## 2020-06-10 DIAGNOSIS — Z12.31 VISIT FOR SCREENING MAMMOGRAM: Primary | ICD-10-CM

## 2020-08-13 ENCOUNTER — APPOINTMENT (OUTPATIENT)
Dept: MAMMOGRAPHY | Facility: HOSPITAL | Age: 80
End: 2020-08-13

## 2020-10-01 ENCOUNTER — TELEPHONE (OUTPATIENT)
Dept: SURGERY | Facility: CLINIC | Age: 80
End: 2020-10-01

## 2020-10-01 NOTE — TELEPHONE ENCOUNTER
Tried to contact patient at both numbers. I was not able to contact the patient concerning her appointment. Mailed patient a letter.

## 2020-11-19 ENCOUNTER — APPOINTMENT (OUTPATIENT)
Dept: MAMMOGRAPHY | Facility: HOSPITAL | Age: 80
End: 2020-11-19

## 2020-11-19 ENCOUNTER — HOSPITAL ENCOUNTER (OUTPATIENT)
Dept: MAMMOGRAPHY | Facility: HOSPITAL | Age: 80
Discharge: HOME | End: 2020-11-19
Admitting: INTERNAL MEDICINE

## 2020-11-19 DIAGNOSIS — Z12.31 BREAST CANCER SCREENING BY MAMMOGRAM: ICD-10-CM

## 2020-11-19 PROCEDURE — 77067 SCR MAMMO BI INCL CAD: CPT

## 2020-11-19 PROCEDURE — 77063 BREAST TOMOSYNTHESIS BI: CPT

## 2021-02-12 ENCOUNTER — APPOINTMENT (OUTPATIENT)
Dept: GENERAL RADIOLOGY | Facility: HOSPITAL | Age: 81
End: 2021-02-12
Payer: MEDICARE

## 2021-02-12 ENCOUNTER — APPOINTMENT (OUTPATIENT)
Dept: CT IMAGING | Facility: HOSPITAL | Age: 81
End: 2021-02-12
Payer: MEDICARE

## 2021-02-12 ENCOUNTER — HOSPITAL ENCOUNTER (OUTPATIENT)
Facility: HOSPITAL | Age: 81
Setting detail: OBSERVATION
Discharge: HOME | End: 2021-02-13
Attending: EMERGENCY MEDICINE | Admitting: INTERNAL MEDICINE
Payer: MEDICARE

## 2021-02-12 DIAGNOSIS — N39.0 URINARY TRACT INFECTION WITHOUT HEMATURIA, SITE UNSPECIFIED: Primary | ICD-10-CM

## 2021-02-12 DIAGNOSIS — E86.0 DEHYDRATION: ICD-10-CM

## 2021-02-12 DIAGNOSIS — N17.9 AKI (ACUTE KIDNEY INJURY) (HCC): ICD-10-CM

## 2021-02-12 LAB
A/G RATIO: 1.2 (ref 0.8–2)
ALBUMIN SERPL-MCNC: 3.6 G/DL (ref 3.4–4.8)
ALP BLD-CCNC: 76 U/L (ref 25–100)
ALT SERPL-CCNC: 18 U/L (ref 4–36)
ANION GAP SERPL CALCULATED.3IONS-SCNC: 8 MMOL/L (ref 3–16)
AST SERPL-CCNC: 20 U/L (ref 8–33)
BACTERIA: ABNORMAL /HPF
BASOPHILS ABSOLUTE: 0 K/UL (ref 0–0.1)
BASOPHILS RELATIVE PERCENT: 0.3 %
BILIRUB SERPL-MCNC: 0.3 MG/DL (ref 0.3–1.2)
BILIRUBIN URINE: NEGATIVE
BLOOD, URINE: NEGATIVE
BUN BLDV-MCNC: 40 MG/DL (ref 6–20)
CALCIUM SERPL-MCNC: 9 MG/DL (ref 8.5–10.5)
CHLORIDE BLD-SCNC: 100 MMOL/L (ref 98–107)
CLARITY: ABNORMAL
CO2: 24 MMOL/L (ref 20–30)
COLOR: YELLOW
CREAT SERPL-MCNC: 1.5 MG/DL (ref 0.4–1.2)
EOSINOPHILS ABSOLUTE: 0.3 K/UL (ref 0–0.4)
EOSINOPHILS RELATIVE PERCENT: 3.4 %
EPITHELIAL CELLS, UA: ABNORMAL /HPF (ref 0–5)
GFR AFRICAN AMERICAN: 40
GFR NON-AFRICAN AMERICAN: 33
GLOBULIN: 3 G/DL
GLUCOSE BLD-MCNC: 160 MG/DL (ref 74–106)
GLUCOSE BLD-MCNC: 273 MG/DL (ref 74–106)
GLUCOSE BLD-MCNC: 277 MG/DL (ref 74–106)
GLUCOSE URINE: 250 MG/DL
HBA1C MFR BLD: 9.2 %
HCT VFR BLD CALC: 34.2 % (ref 37–47)
HEMOGLOBIN: 11 G/DL (ref 11.5–16.5)
IMMATURE GRANULOCYTES #: 0 K/UL
IMMATURE GRANULOCYTES %: 0.1 % (ref 0–5)
KETONES, URINE: NEGATIVE MG/DL
LEUKOCYTE ESTERASE, URINE: ABNORMAL
LYMPHOCYTES ABSOLUTE: 1.8 K/UL (ref 1.5–4)
LYMPHOCYTES RELATIVE PERCENT: 23.4 %
MAGNESIUM: 2.2 MG/DL (ref 1.7–2.4)
MCH RBC QN AUTO: 30.7 PG (ref 27–32)
MCHC RBC AUTO-ENTMCNC: 32.2 G/DL (ref 31–35)
MCV RBC AUTO: 95.5 FL (ref 80–100)
MICROSCOPIC EXAMINATION: YES
MONOCYTES ABSOLUTE: 0.6 K/UL (ref 0.2–0.8)
MONOCYTES RELATIVE PERCENT: 8.2 %
NEUTROPHILS ABSOLUTE: 5 K/UL (ref 2–7.5)
NEUTROPHILS RELATIVE PERCENT: 64.6 %
NITRITE, URINE: NEGATIVE
PDW BLD-RTO: 11.9 % (ref 11–16)
PERFORMED ON: ABNORMAL
PERFORMED ON: ABNORMAL
PH UA: 5.5 (ref 5–8)
PLATELET # BLD: 226 K/UL (ref 150–400)
PMV BLD AUTO: 9.9 FL (ref 6–10)
POTASSIUM REFLEX MAGNESIUM: 5.1 MMOL/L (ref 3.4–5.1)
PRO-BNP: 215 PG/ML (ref 0–1800)
PROTEIN UA: NEGATIVE MG/DL
RBC # BLD: 3.58 M/UL (ref 3.8–5.8)
RBC UA: ABNORMAL /HPF (ref 0–4)
SARS-COV-2, NAAT: NOT DETECTED
SODIUM BLD-SCNC: 132 MMOL/L (ref 136–145)
SPECIFIC GRAVITY UA: 1.02 (ref 1–1.03)
TOTAL PROTEIN: 6.6 G/DL (ref 6.4–8.3)
TROPONIN: <0.3 NG/ML
TSH SERPL DL<=0.05 MIU/L-ACNC: 4.72 UIU/ML (ref 0.27–4.2)
URINE REFLEX TO CULTURE: YES
URINE TYPE: ABNORMAL
UROBILINOGEN, URINE: 1 E.U./DL
WBC # BLD: 7.7 K/UL (ref 4–11)
WBC UA: >100 /HPF (ref 0–5)

## 2021-02-12 PROCEDURE — G0378 HOSPITAL OBSERVATION PER HR: HCPCS

## 2021-02-12 PROCEDURE — U0002 COVID-19 LAB TEST NON-CDC: HCPCS

## 2021-02-12 PROCEDURE — 99284 EMERGENCY DEPT VISIT MOD MDM: CPT

## 2021-02-12 PROCEDURE — 96365 THER/PROPH/DIAG IV INF INIT: CPT

## 2021-02-12 PROCEDURE — 84443 ASSAY THYROID STIM HORMONE: CPT

## 2021-02-12 PROCEDURE — 93005 ELECTROCARDIOGRAM TRACING: CPT

## 2021-02-12 PROCEDURE — 84484 ASSAY OF TROPONIN QUANT: CPT

## 2021-02-12 PROCEDURE — 87186 SC STD MICRODIL/AGAR DIL: CPT

## 2021-02-12 PROCEDURE — 70450 CT HEAD/BRAIN W/O DYE: CPT

## 2021-02-12 PROCEDURE — P9612 CATHETERIZE FOR URINE SPEC: HCPCS

## 2021-02-12 PROCEDURE — 6360000002 HC RX W HCPCS: Performed by: PHYSICIAN ASSISTANT

## 2021-02-12 PROCEDURE — 6370000000 HC RX 637 (ALT 250 FOR IP): Performed by: PHYSICIAN ASSISTANT

## 2021-02-12 PROCEDURE — 2580000003 HC RX 258: Performed by: EMERGENCY MEDICINE

## 2021-02-12 PROCEDURE — 36415 COLL VENOUS BLD VENIPUNCTURE: CPT

## 2021-02-12 PROCEDURE — 96372 THER/PROPH/DIAG INJ SC/IM: CPT

## 2021-02-12 PROCEDURE — 83036 HEMOGLOBIN GLYCOSYLATED A1C: CPT

## 2021-02-12 PROCEDURE — 2580000003 HC RX 258: Performed by: PHYSICIAN ASSISTANT

## 2021-02-12 PROCEDURE — 85025 COMPLETE CBC W/AUTO DIFF WBC: CPT

## 2021-02-12 PROCEDURE — 87077 CULTURE AEROBIC IDENTIFY: CPT

## 2021-02-12 PROCEDURE — 1200000000 HC SEMI PRIVATE

## 2021-02-12 PROCEDURE — 96361 HYDRATE IV INFUSION ADD-ON: CPT

## 2021-02-12 PROCEDURE — 83880 ASSAY OF NATRIURETIC PEPTIDE: CPT

## 2021-02-12 PROCEDURE — 71045 X-RAY EXAM CHEST 1 VIEW: CPT

## 2021-02-12 PROCEDURE — 6360000002 HC RX W HCPCS: Performed by: EMERGENCY MEDICINE

## 2021-02-12 PROCEDURE — 81001 URINALYSIS AUTO W/SCOPE: CPT

## 2021-02-12 PROCEDURE — 80053 COMPREHEN METABOLIC PANEL: CPT

## 2021-02-12 PROCEDURE — 83735 ASSAY OF MAGNESIUM: CPT

## 2021-02-12 PROCEDURE — 51702 INSERT TEMP BLADDER CATH: CPT

## 2021-02-12 PROCEDURE — 87086 URINE CULTURE/COLONY COUNT: CPT

## 2021-02-12 RX ORDER — IPRATROPIUM BROMIDE AND ALBUTEROL SULFATE 2.5; .5 MG/3ML; MG/3ML
1 SOLUTION RESPIRATORY (INHALATION)
Status: DISCONTINUED | OUTPATIENT
Start: 2021-02-12 | End: 2021-02-12

## 2021-02-12 RX ORDER — CYCLOBENZAPRINE HCL 10 MG
10 TABLET ORAL 3 TIMES DAILY PRN
COMMUNITY

## 2021-02-12 RX ORDER — POLYETHYLENE GLYCOL 3350 17 G/17G
17 POWDER, FOR SOLUTION ORAL DAILY PRN
Status: DISCONTINUED | OUTPATIENT
Start: 2021-02-12 | End: 2021-02-13 | Stop reason: HOSPADM

## 2021-02-12 RX ORDER — AMLODIPINE BESYLATE 5 MG/1
10 TABLET ORAL DAILY
Status: DISCONTINUED | OUTPATIENT
Start: 2021-02-13 | End: 2021-02-13 | Stop reason: HOSPADM

## 2021-02-12 RX ORDER — DEXTROSE MONOHYDRATE 25 G/50ML
12.5 INJECTION, SOLUTION INTRAVENOUS PRN
Status: DISCONTINUED | OUTPATIENT
Start: 2021-02-12 | End: 2021-02-13 | Stop reason: HOSPADM

## 2021-02-12 RX ORDER — DOCUSATE SODIUM 100 MG/1
100 CAPSULE, LIQUID FILLED ORAL 2 TIMES DAILY PRN
COMMUNITY

## 2021-02-12 RX ORDER — SODIUM CHLORIDE 9 MG/ML
INJECTION, SOLUTION INTRAVENOUS CONTINUOUS
Status: ACTIVE | OUTPATIENT
Start: 2021-02-12 | End: 2021-02-13

## 2021-02-12 RX ORDER — DEXTROSE MONOHYDRATE 50 MG/ML
100 INJECTION, SOLUTION INTRAVENOUS PRN
Status: DISCONTINUED | OUTPATIENT
Start: 2021-02-12 | End: 2021-02-13 | Stop reason: HOSPADM

## 2021-02-12 RX ORDER — LEVOTHYROXINE SODIUM 0.07 MG/1
75 TABLET ORAL DAILY
Status: DISCONTINUED | OUTPATIENT
Start: 2021-02-13 | End: 2021-02-13 | Stop reason: HOSPADM

## 2021-02-12 RX ORDER — 0.9 % SODIUM CHLORIDE 0.9 %
500 INTRAVENOUS SOLUTION INTRAVENOUS ONCE
Status: COMPLETED | OUTPATIENT
Start: 2021-02-12 | End: 2021-02-12

## 2021-02-12 RX ORDER — CLOPIDOGREL BISULFATE 75 MG/1
75 TABLET ORAL DAILY
Status: DISCONTINUED | OUTPATIENT
Start: 2021-02-13 | End: 2021-02-13 | Stop reason: HOSPADM

## 2021-02-12 RX ORDER — FERROUS SULFATE TAB EC 324 MG (65 MG FE EQUIVALENT) 324 (65 FE) MG
324 TABLET DELAYED RESPONSE ORAL DAILY
Status: DISCONTINUED | OUTPATIENT
Start: 2021-02-13 | End: 2021-02-13 | Stop reason: HOSPADM

## 2021-02-12 RX ORDER — ACETAMINOPHEN 650 MG/1
650 SUPPOSITORY RECTAL EVERY 6 HOURS PRN
Status: DISCONTINUED | OUTPATIENT
Start: 2021-02-12 | End: 2021-02-13 | Stop reason: HOSPADM

## 2021-02-12 RX ORDER — INSULIN GLARGINE 100 [IU]/ML
60 INJECTION, SOLUTION SUBCUTANEOUS EVERY MORNING
Status: DISCONTINUED | OUTPATIENT
Start: 2021-02-13 | End: 2021-02-13 | Stop reason: HOSPADM

## 2021-02-12 RX ORDER — MONTELUKAST SODIUM 10 MG/1
10 TABLET ORAL NIGHTLY
Status: DISCONTINUED | OUTPATIENT
Start: 2021-02-12 | End: 2021-02-13 | Stop reason: HOSPADM

## 2021-02-12 RX ORDER — ACETAMINOPHEN 325 MG/1
650 TABLET ORAL EVERY 6 HOURS PRN
Status: DISCONTINUED | OUTPATIENT
Start: 2021-02-12 | End: 2021-02-13 | Stop reason: HOSPADM

## 2021-02-12 RX ORDER — ONDANSETRON 2 MG/ML
4 INJECTION INTRAMUSCULAR; INTRAVENOUS EVERY 6 HOURS PRN
Status: DISCONTINUED | OUTPATIENT
Start: 2021-02-12 | End: 2021-02-13 | Stop reason: HOSPADM

## 2021-02-12 RX ORDER — CLOPIDOGREL BISULFATE 75 MG/1
75 TABLET ORAL DAILY
COMMUNITY

## 2021-02-12 RX ORDER — FLUTICASONE PROPIONATE 50 MCG
1 SPRAY, SUSPENSION (ML) NASAL DAILY
Status: DISCONTINUED | OUTPATIENT
Start: 2021-02-12 | End: 2021-02-13 | Stop reason: HOSPADM

## 2021-02-12 RX ORDER — PROMETHAZINE HYDROCHLORIDE 25 MG/1
12.5 TABLET ORAL EVERY 6 HOURS PRN
Status: DISCONTINUED | OUTPATIENT
Start: 2021-02-12 | End: 2021-02-13 | Stop reason: HOSPADM

## 2021-02-12 RX ORDER — OXYCODONE AND ACETAMINOPHEN 7.5; 325 MG/1; MG/1
0.5 TABLET ORAL EVERY 8 HOURS PRN
Status: DISCONTINUED | OUTPATIENT
Start: 2021-02-12 | End: 2021-02-13 | Stop reason: HOSPADM

## 2021-02-12 RX ORDER — FENOFIBRATE 160 MG/1
160 TABLET ORAL DAILY
COMMUNITY

## 2021-02-12 RX ORDER — IPRATROPIUM BROMIDE AND ALBUTEROL SULFATE 2.5; .5 MG/3ML; MG/3ML
1 SOLUTION RESPIRATORY (INHALATION) EVERY 4 HOURS PRN
Status: DISCONTINUED | OUTPATIENT
Start: 2021-02-12 | End: 2021-02-13 | Stop reason: HOSPADM

## 2021-02-12 RX ORDER — FERROUS SULFATE 325(65) MG
325 TABLET ORAL
COMMUNITY

## 2021-02-12 RX ORDER — ATORVASTATIN CALCIUM 80 MG/1
80 TABLET, FILM COATED ORAL DAILY
COMMUNITY

## 2021-02-12 RX ORDER — BUDESONIDE 0.5 MG/2ML
0.5 INHALANT ORAL 2 TIMES DAILY
Status: DISCONTINUED | OUTPATIENT
Start: 2021-02-12 | End: 2021-02-12

## 2021-02-12 RX ORDER — OYSTER SHELL CALCIUM WITH VITAMIN D 500; 200 MG/1; [IU]/1
1 TABLET, FILM COATED ORAL DAILY
Status: DISCONTINUED | OUTPATIENT
Start: 2021-02-13 | End: 2021-02-13 | Stop reason: HOSPADM

## 2021-02-12 RX ORDER — FOLIC ACID 1 MG/1
1 TABLET ORAL DAILY
Status: DISCONTINUED | OUTPATIENT
Start: 2021-02-13 | End: 2021-02-13 | Stop reason: HOSPADM

## 2021-02-12 RX ORDER — INSULIN GLARGINE 100 [IU]/ML
60 INJECTION, SOLUTION SUBCUTANEOUS NIGHTLY
Status: DISCONTINUED | OUTPATIENT
Start: 2021-02-12 | End: 2021-02-12

## 2021-02-12 RX ORDER — NICOTINE POLACRILEX 4 MG
15 LOZENGE BUCCAL PRN
Status: DISCONTINUED | OUTPATIENT
Start: 2021-02-12 | End: 2021-02-13 | Stop reason: HOSPADM

## 2021-02-12 RX ORDER — CYCLOBENZAPRINE HCL 10 MG
10 TABLET ORAL 3 TIMES DAILY PRN
Status: DISCONTINUED | OUTPATIENT
Start: 2021-02-12 | End: 2021-02-13 | Stop reason: HOSPADM

## 2021-02-12 RX ORDER — FUROSEMIDE 20 MG/1
TABLET ORAL
Status: ON HOLD | COMMUNITY
End: 2021-02-13 | Stop reason: SDUPTHER

## 2021-02-12 RX ORDER — AMITRIPTYLINE HYDROCHLORIDE 10 MG/1
10 TABLET, FILM COATED ORAL NIGHTLY
Status: ON HOLD | COMMUNITY
End: 2021-02-13 | Stop reason: HOSPADM

## 2021-02-12 RX ORDER — DOXEPIN HYDROCHLORIDE 50 MG/1
50 CAPSULE ORAL NIGHTLY
COMMUNITY

## 2021-02-12 RX ORDER — FOLIC ACID 1 MG/1
1 TABLET ORAL DAILY
COMMUNITY

## 2021-02-12 RX ORDER — MELOXICAM 15 MG/1
15 TABLET ORAL DAILY
Status: ON HOLD | COMMUNITY
End: 2021-02-13 | Stop reason: HOSPADM

## 2021-02-12 RX ORDER — AMLODIPINE BESYLATE 10 MG/1
10 TABLET ORAL DAILY
COMMUNITY

## 2021-02-12 RX ORDER — PANTOPRAZOLE SODIUM 40 MG/1
40 TABLET, DELAYED RELEASE ORAL
Status: DISCONTINUED | OUTPATIENT
Start: 2021-02-13 | End: 2021-02-13 | Stop reason: HOSPADM

## 2021-02-12 RX ORDER — DOCUSATE SODIUM 100 MG/1
100 CAPSULE, LIQUID FILLED ORAL 2 TIMES DAILY PRN
Status: DISCONTINUED | OUTPATIENT
Start: 2021-02-12 | End: 2021-02-13 | Stop reason: HOSPADM

## 2021-02-12 RX ORDER — FERROUS SULFATE 325(65) MG
325 TABLET ORAL DAILY
Status: DISCONTINUED | OUTPATIENT
Start: 2021-02-12 | End: 2021-02-12 | Stop reason: CLARIF

## 2021-02-12 RX ORDER — ATORVASTATIN CALCIUM 80 MG/1
80 TABLET, FILM COATED ORAL DAILY
Status: DISCONTINUED | OUTPATIENT
Start: 2021-02-13 | End: 2021-02-13 | Stop reason: HOSPADM

## 2021-02-12 RX ORDER — CETIRIZINE HYDROCHLORIDE 5 MG/1
5 TABLET ORAL DAILY
Status: DISCONTINUED | OUTPATIENT
Start: 2021-02-13 | End: 2021-02-13 | Stop reason: HOSPADM

## 2021-02-12 RX ORDER — OMEPRAZOLE 40 MG/1
40 CAPSULE, DELAYED RELEASE ORAL DAILY
COMMUNITY

## 2021-02-12 RX ORDER — CARVEDILOL 25 MG/1
25 TABLET ORAL 2 TIMES DAILY WITH MEALS
Status: DISCONTINUED | OUTPATIENT
Start: 2021-02-12 | End: 2021-02-13 | Stop reason: HOSPADM

## 2021-02-12 RX ORDER — ASPIRIN 81 MG/1
81 TABLET, CHEWABLE ORAL DAILY
Status: DISCONTINUED | OUTPATIENT
Start: 2021-02-13 | End: 2021-02-13 | Stop reason: HOSPADM

## 2021-02-12 RX ADMIN — SODIUM CHLORIDE 500 ML: 9 INJECTION, SOLUTION INTRAVENOUS at 12:39

## 2021-02-12 RX ADMIN — ENOXAPARIN SODIUM 40 MG: 40 INJECTION SUBCUTANEOUS at 16:07

## 2021-02-12 RX ADMIN — FLUTICASONE PROPIONATE 1 SPRAY: 50 SPRAY, METERED NASAL at 17:12

## 2021-02-12 RX ADMIN — INSULIN LISPRO 2 UNITS: 100 INJECTION, SOLUTION INTRAVENOUS; SUBCUTANEOUS at 21:13

## 2021-02-12 RX ADMIN — CEFTRIAXONE 1000 MG: 1 INJECTION, POWDER, FOR SOLUTION INTRAMUSCULAR; INTRAVENOUS at 12:40

## 2021-02-12 RX ADMIN — CYCLOBENZAPRINE 10 MG: 10 TABLET, FILM COATED ORAL at 21:10

## 2021-02-12 RX ADMIN — INSULIN LISPRO 1 UNITS: 100 INJECTION, SOLUTION INTRAVENOUS; SUBCUTANEOUS at 17:07

## 2021-02-12 RX ADMIN — MONTELUKAST SODIUM 10 MG: 10 TABLET, COATED ORAL at 21:10

## 2021-02-12 RX ADMIN — SODIUM CHLORIDE: 9 INJECTION, SOLUTION INTRAVENOUS at 16:07

## 2021-02-12 RX ADMIN — CARVEDILOL 25 MG: 25 TABLET, FILM COATED ORAL at 16:06

## 2021-02-12 NOTE — ED NOTES
Spoke with adilia at [facility] she will fax pt med list to me     Tanja Epley, RN  02/12/21 3493

## 2021-02-12 NOTE — ED NOTES
Pt to ct scan at this time with [facility] in radiology     Herson Tan, WINTER  02/12/21 1100

## 2021-02-12 NOTE — PLAN OF CARE
Problem: Falls - Risk of:  Goal: Will remain free from falls  Description: Will remain free from falls  Outcome: Ongoing  Goal: Absence of physical injury  Description: Absence of physical injury  Outcome: Ongoing     Problem: Skin Integrity:  Goal: Absence of new skin breakdown  Description: Absence of new skin breakdown  Outcome: Ongoing     Problem: Pain Control  Goal: Maintain pain level at or below patient's acceptable level (or 5 if patient is unable to determine acceptable level)  Outcome: Ongoing     Problem: Cardiovascular  Goal: Hemodynamic stability  Outcome: Ongoing

## 2021-02-12 NOTE — ED PROVIDER NOTES
Days per week: Not on file     Minutes per session: Not on file    Stress: Not on file   Relationships    Social connections     Talks on phone: Not on file     Gets together: Not on file     Attends [employer] service: Not on file     Active member of club or organization: Not on file     Attends meetings of clubs or organizations: Not on file     Relationship status: Not on file    Intimate partner violence     Fear of current or ex partner: Not on file     Emotionally abused: Not on file     Physically abused: Not on file     Forced sexual activity: Not on file   Other Topics Concern    Not on file   Social History Narrative    Not on file     No current facility-administered medications for this encounter. Current Outpatient Medications   Medication Sig Dispense Refill    mometasone-formoterol (DULERA) 200-5 MCG/ACT inhaler Inhale 2 puffs into the lungs 2 times daily      montelukast (SINGULAIR) 10 MG tablet Take 10 mg by mouth nightly.  aspirin 81 MG chewable tablet Take 81 mg by mouth daily.  oxyCODONE-acetaminophen (PERCOCET) 7.5-325 MG per tablet Take 1 tablet by mouth daily. Raeannrichelle Bashir Insulin Glargine (LANTUS SC) Inject 50 Units into the skin daily.  ondansetron (ZOFRAN ODT) 4 MG disintegrating tablet Take 1 tablet by mouth every 8 hours as needed for Nausea or Vomiting 15 tablet 0    nitroGLYCERIN (NITROSTAT) 0.4 MG SL tablet Take with onset of chest pain, up to max of 3 total doses 5 min apart.  If no relief after 3 doses, call 911 or go to the Emergency Room 25 tablet 0    Calcium Carb-Cholecalciferol (CALCIUM+D3 PO) Take by mouth daily      loratadine (CLARITIN) 10 MG tablet Take 10 mg by mouth daily      fluticasone (FLONASE) 50 MCG/ACT nasal spray 1 spray by Nasal route daily Each nostril daily      ranitidine (ZANTAC) 150 MG tablet Take 1 tablet by mouth 2 times daily 60 tablet 0    naproxen (NAPROSYN) 500 MG tablet Take 1 tablet by mouth 2 times daily as needed for 8.2 %    Eosinophils % 3.4 %    Basophils % 0.3 %    Neutrophils Absolute 5.0 2.0 - 7.5 K/uL    Immature Granulocytes # 0.0 K/uL    Lymphocytes Absolute 1.8 1.5 - 4.0 K/uL    Monocytes Absolute 0.6 0.2 - 0.8 K/uL    Eosinophils Absolute 0.3 0.0 - 0.4 K/uL    Basophils Absolute 0.0 0.0 - 0.1 K/uL   Comprehensive Metabolic Panel w/ Reflex to MG   Result Value Ref Range    Sodium 132 (L) 136 - 145 mmol/L    Potassium reflex Magnesium 5.1 3.4 - 5.1 mmol/L    Chloride 100 98 - 107 mmol/L    CO2 24 20 - 30 mmol/L    Anion Gap 8 3 - 16    Glucose 277 (H) 74 - 106 mg/dL    BUN 40 (H) 6 - 20 mg/dL    CREATININE 1.5 (H) 0.4 - 1.2 mg/dL    GFR Non- 33 (L) >59    GFR  40 (L) >59    Calcium 9.0 8.5 - 10.5 mg/dL    Total Protein 6.6 6.4 - 8.3 g/dL    Albumin 3.6 3.4 - 4.8 g/dL    Albumin/Globulin Ratio 1.2 0.8 - 2.0    Total Bilirubin 0.3 0.3 - 1.2 mg/dL    Alkaline Phosphatase 76 25 - 100 U/L    ALT 18 4 - 36 U/L    AST 20 8 - 33 U/L    Globulin 3.0 g/dL   Troponin   Result Value Ref Range    Troponin <0.30 <0.30 ng/mL   Brain Natriuretic Peptide   Result Value Ref Range    Pro- 0 - 1,800 pg/mL   Urinalysis Reflex to Culture    Specimen: Urine, clean catch   Result Value Ref Range    Color, UA Yellow Straw/Yellow    Clarity, UA SLCLOUDY Clear    Glucose, Ur 250 (A) Negative mg/dL    Bilirubin Urine Negative Negative    Ketones, Urine Negative Negative mg/dL    Specific Gravity, UA 1.025 1.005 - 1.030    Blood, Urine Negative Negative    pH, UA 5.5 5.0 - 8.0    Protein, UA Negative Negative mg/dL    Urobilinogen, Urine 1.0 <2.0 E.U./dL    Nitrite, Urine Negative Negative    Leukocyte Esterase, Urine SMALL (A) Negative    Microscopic Examination YES     Urine Type NotGiven     Urine Reflex to Culture Yes    Magnesium   Result Value Ref Range    Magnesium 2.2 1.7 - 2.4 mg/dL   Microscopic Urinalysis   Result Value Ref Range    WBC, UA >100 (A) 0 - 5 /HPF    RBC, UA None seen 0 - 4 /HPF normal, was found to have an acute kidney injury, likely secondary to a bladder infection. She was given IV Rocephin, IV fluids here and I did speak with hospital medicine who will admit the patient for hydration recheck of kidney function. Patient was given scripts for the following medications. I counseled patient how to take these medications. New Prescriptions    No medications on file       CLINICAL IMPRESSION  1. Urinary tract infection without hematuria, site unspecified    2. DARCIE (acute kidney injury) ([facility] [address]    3. Dehydration        Blood pressure (!) 135/58, pulse 67, temperature 98.7 °F (37.1 °C), temperature source Oral, resp. rate 16, height 5' (1.524 m), weight 200 lb (90.7 kg), SpO2 97 %. DISPOSITION  Aaron Lehman was admitted in stable condition.                 Luis Felipe Kaur MD  02/12/21 3996

## 2021-02-12 NOTE — ED NOTES
Pt returns from ct scan.   She is aware of plan for pal catheter and is in agreement with plan     Derrick Love RN  02/12/21 4287

## 2021-02-12 NOTE — PROGRESS NOTES
Pt states that she doesn't use any type of inhalers or breathing medications at home except for a ProAir MDI in which she only uses PRN. B/S clear t/o diminished bilateral bases. Pt states that she is not soa and doesn't need breathing tx at this time. -- ТАТЬЯНА Christopher, CRT---

## 2021-02-12 NOTE — ED NOTES
Pt assisted to wheel chair and brought to floor and assisted to bed.   kg simmons at [address]  02/12/21 1811

## 2021-02-13 VITALS
RESPIRATION RATE: 16 BRPM | DIASTOLIC BLOOD PRESSURE: 51 MMHG | HEART RATE: 63 BPM | BODY MASS INDEX: 39.27 KG/M2 | WEIGHT: 200 LBS | HEIGHT: 60 IN | OXYGEN SATURATION: 96 % | SYSTOLIC BLOOD PRESSURE: 125 MMHG | TEMPERATURE: 98 F

## 2021-02-13 PROBLEM — N18.30 STAGE 3 CHRONIC KIDNEY DISEASE (HCC): Status: ACTIVE | Noted: 2021-02-13

## 2021-02-13 PROBLEM — E03.8 OTHER SPECIFIED HYPOTHYROIDISM: Status: ACTIVE | Noted: 2021-02-13

## 2021-02-13 PROBLEM — N30.00 ACUTE CYSTITIS WITHOUT HEMATURIA: Status: ACTIVE | Noted: 2021-02-13

## 2021-02-13 PROBLEM — D64.9 ANEMIA: Status: ACTIVE | Noted: 2021-02-13

## 2021-02-13 PROBLEM — J42 CHRONIC BRONCHITIS (HCC): Status: ACTIVE | Noted: 2021-02-13

## 2021-02-13 PROBLEM — E66.9 OBESITY (BMI 30-39.9): Status: ACTIVE | Noted: 2021-02-13

## 2021-02-13 PROBLEM — E11.9 TYPE 2 DIABETES MELLITUS, WITH LONG-TERM CURRENT USE OF INSULIN (HCC): Status: ACTIVE | Noted: 2021-02-13

## 2021-02-13 PROBLEM — Z79.4 TYPE 2 DIABETES MELLITUS, WITH LONG-TERM CURRENT USE OF INSULIN (HCC): Status: ACTIVE | Noted: 2021-02-13

## 2021-02-13 PROBLEM — E87.5 HYPERKALEMIA: Status: ACTIVE | Noted: 2021-02-13

## 2021-02-13 PROBLEM — I10 BENIGN ESSENTIAL HTN: Status: ACTIVE | Noted: 2021-02-13

## 2021-02-13 LAB
A/G RATIO: 1.2 (ref 0.8–2)
ALBUMIN SERPL-MCNC: 3.2 G/DL (ref 3.4–4.8)
ALP BLD-CCNC: 64 U/L (ref 25–100)
ALT SERPL-CCNC: 15 U/L (ref 4–36)
ANION GAP SERPL CALCULATED.3IONS-SCNC: 8 MMOL/L (ref 3–16)
AST SERPL-CCNC: 19 U/L (ref 8–33)
BILIRUB SERPL-MCNC: <0.2 MG/DL (ref 0.3–1.2)
BUN BLDV-MCNC: 34 MG/DL (ref 6–20)
CALCIUM SERPL-MCNC: 8.6 MG/DL (ref 8.5–10.5)
CHLORIDE BLD-SCNC: 108 MMOL/L (ref 98–107)
CO2: 24 MMOL/L (ref 20–30)
CREAT SERPL-MCNC: 1.2 MG/DL (ref 0.4–1.2)
FERRITIN: 183.5 NG/ML (ref 22–322)
FOLATE: 19.87 NG/ML
GFR AFRICAN AMERICAN: 52
GFR NON-AFRICAN AMERICAN: 43
GLOBULIN: 2.6 G/DL
GLUCOSE BLD-MCNC: 103 MG/DL (ref 74–106)
GLUCOSE BLD-MCNC: 132 MG/DL (ref 74–106)
GLUCOSE BLD-MCNC: 92 MG/DL (ref 74–106)
HCT VFR BLD CALC: 30.9 % (ref 37–47)
HEMOGLOBIN: 9.7 G/DL (ref 11.5–16.5)
IRON SATURATION: 39 % (ref 15–50)
IRON: 94 UG/DL (ref 37–145)
MCH RBC QN AUTO: 30.2 PG (ref 27–32)
MCHC RBC AUTO-ENTMCNC: 31.4 G/DL (ref 31–35)
MCV RBC AUTO: 96.3 FL (ref 80–100)
PDW BLD-RTO: 11.8 % (ref 11–16)
PERFORMED ON: ABNORMAL
PERFORMED ON: NORMAL
PLATELET # BLD: 194 K/UL (ref 150–400)
PMV BLD AUTO: 10.1 FL (ref 6–10)
POTASSIUM REFLEX MAGNESIUM: 4.2 MMOL/L (ref 3.4–5.1)
RBC # BLD: 3.21 M/UL (ref 3.8–5.8)
SODIUM BLD-SCNC: 140 MMOL/L (ref 136–145)
TOTAL IRON BINDING CAPACITY: 238 UG/DL (ref 250–450)
TOTAL PROTEIN: 5.8 G/DL (ref 6.4–8.3)
VITAMIN B-12: 405 PG/ML (ref 211–911)
WBC # BLD: 5.6 K/UL (ref 4–11)

## 2021-02-13 PROCEDURE — 82728 ASSAY OF FERRITIN: CPT

## 2021-02-13 PROCEDURE — 82607 VITAMIN B-12: CPT

## 2021-02-13 PROCEDURE — 96372 THER/PROPH/DIAG INJ SC/IM: CPT

## 2021-02-13 PROCEDURE — 83540 ASSAY OF IRON: CPT

## 2021-02-13 PROCEDURE — 80053 COMPREHEN METABOLIC PANEL: CPT

## 2021-02-13 PROCEDURE — 85027 COMPLETE CBC AUTOMATED: CPT

## 2021-02-13 PROCEDURE — G0378 HOSPITAL OBSERVATION PER HR: HCPCS

## 2021-02-13 PROCEDURE — 83550 IRON BINDING TEST: CPT

## 2021-02-13 PROCEDURE — 36415 COLL VENOUS BLD VENIPUNCTURE: CPT

## 2021-02-13 PROCEDURE — 99235 HOSP IP/OBS SAME DATE MOD 70: CPT | Performed by: INTERNAL MEDICINE

## 2021-02-13 PROCEDURE — 82746 ASSAY OF FOLIC ACID SERUM: CPT

## 2021-02-13 PROCEDURE — 82306 VITAMIN D 25 HYDROXY: CPT

## 2021-02-13 PROCEDURE — 6370000000 HC RX 637 (ALT 250 FOR IP): Performed by: PHYSICIAN ASSISTANT

## 2021-02-13 PROCEDURE — 6360000002 HC RX W HCPCS: Performed by: PHYSICIAN ASSISTANT

## 2021-02-13 RX ORDER — CEFDINIR 300 MG/1
300 CAPSULE ORAL 2 TIMES DAILY
Qty: 10 CAPSULE | Refills: 0 | Status: SHIPPED | OUTPATIENT
Start: 2021-02-13 | End: 2021-02-18

## 2021-02-13 RX ORDER — FUROSEMIDE 20 MG/1
20 TABLET ORAL DAILY PRN
Qty: 60 TABLET | Refills: 3 | Status: SHIPPED
Start: 2021-02-13

## 2021-02-13 RX ADMIN — FOLIC ACID 1 MG: 1 TABLET ORAL at 08:26

## 2021-02-13 RX ADMIN — CARVEDILOL 25 MG: 25 TABLET, FILM COATED ORAL at 08:26

## 2021-02-13 RX ADMIN — INSULIN GLARGINE 60 UNITS: 100 INJECTION, SOLUTION SUBCUTANEOUS at 08:31

## 2021-02-13 RX ADMIN — CETIRIZINE HYDROCHLORIDE 5 MG: 5 TABLET ORAL at 08:26

## 2021-02-13 RX ADMIN — CYCLOBENZAPRINE 10 MG: 10 TABLET, FILM COATED ORAL at 01:39

## 2021-02-13 RX ADMIN — CLOPIDOGREL BISULFATE 75 MG: 75 TABLET ORAL at 08:26

## 2021-02-13 RX ADMIN — OXYCODONE HYDROCHLORIDE AND ACETAMINOPHEN 0.5 TABLET: 7.5; 325 TABLET ORAL at 01:40

## 2021-02-13 RX ADMIN — AMLODIPINE BESYLATE 10 MG: 5 TABLET ORAL at 08:26

## 2021-02-13 RX ADMIN — LEVOTHYROXINE SODIUM 75 MCG: 0.07 TABLET ORAL at 05:55

## 2021-02-13 RX ADMIN — PANTOPRAZOLE SODIUM 40 MG: 40 TABLET, DELAYED RELEASE ORAL at 05:55

## 2021-02-13 RX ADMIN — OYSTER SHELL CALCIUM WITH VITAMIN D 1 TABLET: 500; 200 TABLET, FILM COATED ORAL at 08:26

## 2021-02-13 RX ADMIN — ENOXAPARIN SODIUM 40 MG: 40 INJECTION SUBCUTANEOUS at 08:25

## 2021-02-13 RX ADMIN — FLUTICASONE PROPIONATE 1 SPRAY: 50 SPRAY, METERED NASAL at 08:25

## 2021-02-13 RX ADMIN — ASPIRIN 81 MG: 81 TABLET, CHEWABLE ORAL at 08:26

## 2021-02-13 ASSESSMENT — PAIN SCALES - GENERAL: PAINLEVEL_OUTOF10: 4

## 2021-02-13 NOTE — PROGRESS NOTES
Pt discharged at this time. Pt IV removed. Pt educated on new medications and follow up appointments. Pt left floor ambulatory.

## 2021-02-13 NOTE — DISCHARGE SUMMARY
Short Stay Summary      Patient ID: Dianne Retana      Patient's PCP: Antonio Juarez Date: 2/12/2021     Discharge Date:   2/13/2021    Admitting Physician: Yumiko Ramirez MD    Discharge Physician: MAGGIE Wright     Reason for this admission:   Abnormal labs  Fatigue     Discharge Diagnoses: Active Hospital Problems    Diagnosis Date Noted    Type 2 diabetes mellitus, with long-term current use of insulin ([address] [E11.9, Z79.4] 02/13/2021    Other specified hypothyroidism [E03.8] 02/13/2021    Stage 3 chronic kidney disease [N18.30] 02/13/2021    Obesity (BMI 30-39. 9) [E66.9] 02/13/2021    Hyperkalemia [E87.5] 02/13/2021    Benign essential HTN [I10] 02/13/2021    Chronic bronchitis ([address] Joanne Marshall 02/13/2021    Acute cystitis without hematuria [N30.00] 02/13/2021    Anemia [D64.9] 02/13/2021    Acute renal failure (ARF) ([address] [N17.9] 02/12/2021       Procedures:  XR CHEST PORTABLE   Final Result      No acute pulmonary pathology or change from the prior exam                  CT Head WO Contrast   Final Result      Age-related changes with tiny white matter infarct on the right, age indeterminate      No acute intracranial hemorrhage or signs of mass effect            Consults:   None    HISTORY OF PRESENT ILLNESS:   The patient is a 79 y.o. female with PMH of obesity, chronic kidney disease, CAD, diabetes, hypertension, hypothyroidism, hyperlipidemia who presents due to abnormal labs. States that her doctor called and told her to go to the ER because her potassium was high and her kidney numbers were bad. Patient states she has been more fatigued than usual over the last few days and \"not feeling quite right\" but no specific complaint. Denies fever, sweats, chills. Denies dysuria or urinary frequency. No sick contacts. States she feels fine today and would like to go home. States she feels she has more energy today. Seems to be at her baseline.  Reports her  is being discharged from a nursing home today and she wants to be home to receive him. Review of system  Constitutional:  Denies fever or chills. Positive for fatigue. Eyes:  Denies change in visual acuity or discharge. HENT:  Denies nasal congestion or sore throat. Respiratory:  Denies cough or shortness of breath. Cardiovascular:  Denies chest pain, palpitation or swelling in LEs. GI:  Denies abdominal pain, nausea, vomiting, bloody stools or diarrhea. :  Denies dysuria or frequency. Musculoskeletal:  Positive for chronic arthralgias. No acute back pain. .   Integument:  Denies rash or itching. Neurologic:  Denies headache, focal weakness or sensory changes. Psychiatric:  Denies depression or anxiety. Past Medical History:      Diagnosis Date    Arthritis     CAD (coronary artery disease)     Chronic kidney disease     ? low functioning per pt    Diabetes mellitus ([facility] [address]     Hyperlipidemia     Hypertension     Thyroid disease        Past Surgical History:      Procedure Laterality Date    CARDIAC SURGERY      3 stents in  2018    CAROTID ENDARTERECTOMY Right      SECTION      CHOLECYSTECTOMY      COLONOSCOPY      HYSTERECTOMY         Social History:   TOBACCO:   reports that she has quit smoking. She has never used smokeless tobacco.  ETOH:   reports no history of alcohol use. OCCUPATION:  None    Family History:   History reviewed. No pertinent family history. Allergies:  Patient has no known allergies. Medications Prior to Admission:    Prior to Admission medications    Medication Sig Start Date End Date Taking?  Authorizing Provider   cefdinir (OMNICEF) 300 MG capsule Take 1 capsule by mouth 2 times daily for 5 days 21 Yes MAGGIE Barcenas   furosemide (LASIX) 20 MG tablet Take 1 tablet by mouth daily as needed (worsening swelling or weight gain > 3 lbs) Take 20mg by mouth every day for 4-5 days and stop for lower extremity edema 21  Yes MAGGIE Barcenas insulin lispro (HUMALOG) 100 UNIT/ML injection vial Inject 5 Units into the skin 3 times daily (before meals) 2/13/21  Yes MAGGIE Wright   omeprazole (PRILOSEC) 40 MG delayed release capsule Take 40 mg by mouth daily   Yes Historical Provider, MD   cyclobenzaprine (FLEXERIL) 10 MG tablet Take 10 mg by mouth 3 times daily as needed for Muscle spasms   Yes Historical Provider, MD   ferrous sulfate (IRON 325) 325 (65 Fe) MG tablet Take 325 mg by mouth daily (with breakfast)   Yes Historical Provider, MD   amLODIPine (NORVASC) 10 MG tablet Take 10 mg by mouth daily   Yes Historical Provider, MD   folic acid (FOLVITE) 1 MG tablet Take 1 mg by mouth daily   Yes Historical Provider, MD   fenofibrate (TRIGLIDE) 160 MG tablet Take 160 mg by mouth daily   Yes Historical Provider, MD   atorvastatin (LIPITOR) 80 MG tablet Take 80 mg by mouth daily   Yes Historical Provider, MD   clopidogrel (PLAVIX) 75 MG tablet Take 75 mg by mouth daily   Yes Historical Provider, MD   docusate sodium (COLACE) 100 MG capsule Take 100 mg by mouth 2 times daily as needed for Constipation   Yes Historical Provider, MD   doxepin (SINEQUAN) 50 MG capsule Take 50 mg by mouth nightly   Yes Historical Provider, MD   Calcium Carb-Cholecalciferol (CALCIUM+D3 PO) Take by mouth daily   Yes Historical Provider, MD   mometasone-formoterol (DULERA) 200-5 MCG/ACT inhaler Inhale 2 puffs into the lungs 2 times daily   Yes Historical Provider, MD   loratadine (CLARITIN) 10 MG tablet Take 10 mg by mouth daily   Yes Historical Provider, MD   fluticasone (FLONASE) 50 MCG/ACT nasal spray 1 spray by Nasal route daily Each nostril daily   Yes Historical Provider, MD   carvedilol (COREG) 25 MG tablet Take 25 mg by mouth 2 times daily (with meals). Yes Historical Provider, MD   montelukast (SINGULAIR) 10 MG tablet Take 10 mg by mouth nightly. Yes Historical Provider, MD   levothyroxine (SYNTHROID) 75 MCG tablet Take 75 mcg by mouth Daily.    Yes Historical Provider, MD   aspirin 81 MG chewable tablet Take 81 mg by mouth daily. Yes Historical Provider, MD   oxyCODONE-acetaminophen (PERCOCET) 7.5-325 MG per tablet Take 1 tablet by mouth every 8 hours as needed. Yes Historical Provider, MD   Insulin Glargine (LANTUS SC) Inject 75 Units into the skin daily    Yes Historical Provider, MD   ondansetron (ZOFRAN ODT) 4 MG disintegrating tablet Take 1 tablet by mouth every 8 hours as needed for Nausea or Vomiting 5/27/19   Sixto Matthews DO   nitroGLYCERIN (NITROSTAT) 0.4 MG SL tablet Take with onset of chest pain, up to max of 3 total doses 5 min apart. If no relief after 3 doses, call 911 or go to the Emergency Room 1/5/19   [facility] SHIV Keita DO       Vital Signs  Temp: 98 °F (36.7 °C)  Pulse: 63  Resp: 16  BP: (!) 125/51  SpO2: 96 %  O2 Device: None (Room air)       Vital signs reviewed in electronic chart. Physical exam  Constitutional:  Well developed, well nourished, no acute distress. Obese female lying in bed. Eyes:  PERRL, conjunctiva normal, EOMI. HENT:  Atraumatic, external ears normal, external nose/nares normal, oropharynx moist, no pharyngeal exudates. Neck:  Supple. No JVD or thyromegaly. Respiratory:  No respiratory distress on room air, normal breath sounds, no rales, no wheezing. Cardiovascular:  Normal rate, normal rhythm, no murmurs, no gallops, no rubs. GI:  Soft, obese, nondistended, normal bowel sounds, nontender, no organomegaly, no mass. Musculoskeletal:  No edema, no tenderness, no obvious deformities. Patient is moving all extremities. Integument:  Well hydrated, no rash. Neurologic:  Alert & oriented x 3,  no focal deficits noted. Strength is equal throughout. Psychiatric:  Speech and behavior appropriate.       Lab Results   Component Value Date    WBC 5.6 02/13/2021    HGB 9.7 (L) 02/13/2021    HCT 30.9 (L) 02/13/2021    MCV 96.3 02/13/2021     02/13/2021       Lab Results   Component Value Date     02/13/2021    K 4.2 02/13/2021     (H) 02/13/2021    CO2 24 02/13/2021    BUN 34 (H) 02/13/2021    CREATININE 1.2 02/13/2021    GLUCOSE 92 02/13/2021    CALCIUM 8.6 02/13/2021    PROT 5.8 (L) 02/13/2021    LABALBU 3.2 (L) 02/13/2021    BILITOT <0.2 (L) 02/13/2021    ALKPHOS 64 02/13/2021    AST 19 02/13/2021    ALT 15 02/13/2021    LABGLOM 43 (L) 02/13/2021    GFRAA 52 (L) 02/13/2021    AGRATIO 1.2 02/13/2021    GLOB 2.6 02/13/2021       Assessment and Plan/Hospital course: Active Hospital Problems    Diagnosis Date Noted    Type 2 diabetes mellitus, with long-term current use of insulin (HCC) [E11.9, Z79.4]  - A1C 9.2  - Home regimen lantus 75 units in AM. Prescribed lispro 20 units TID with meals but she does not take this. - Discussed importance of better blood glucose control with patient and she expresses understanding. States she has glucometer and testing supplies as well as insulin. Has not been taking mealtime insulin due to fear of hypoglycemia. Encouraged her to start this at lower dose and increase as needed. Also encouraged her to monitor glucose at least twice daily and report readings to pcp.   - encourage diabetic diet  - f/u with pcp . May benefit from oral diabetic medication as well - defer to pcp.  02/13/2021    Other specified hypothyroidism [E03.8]  - TSH slightly elevated at 4.72  - continue home synthroid  02/13/2021    Stage 3 chronic kidney disease [N18.30]  - Patient with acute on chronic renal failure. Although baseline Cr unclear GFR in 50s with last 2 years.   - BUN 40/Cr 1.5 on arrival. Home lasix and losartan held. Hydrated gently with IVFs. Stopped naproxen and mobic.   - BUN 32/Cr 1.2 2/13   - Avoid nephrotoxic agent as able   - Follow up with pcp for repeat labs and monitoring as outpatient 02/13/2021    Obesity (BMI 30-39. 9) [N64.1]  -Complicates all aspects of care  -Encourage weight loss 02/13/2021    Hyperkalemia [E87.5]  - Patient reports her PCP had done labs few days ago and told her to report to ER due to hyperkalemia. - K 5.1 on arrival to ER.   - Losartan held with ARF, hyperkalemia, and low BP   - hydrated with IVFs and encourage low potassium diet   - K 4.2 2/13   - f/u with pcp and repeat labs as outpatient 02/13/2021    Benign essential HTN [I10]  - Continue home amloidipine, coreg, and lasix on as needed basis  - continue to hold losartan - /51 today without losartan or diuretic   - f/u with pcp   - encouraged patient to monitor bp daily at home and write down readings to take to pcp  02/13/2021    Chronic bronchitis (HCC) [J42]  - history of COPD. No acute exacerbation. Resume home inhaler regimen. On room air.  02/13/2021    Acute cystitis without hematuria [N30.00]  - UA with evidence of UTI.   - Rocephin while inpatient and will dc with omnicef  - follow urine culture and notify patient with any necessary medication change 02/13/2021    Anemia [D64.9]  - Hgb 9.7 2/13  - she did receive IVFs in ED and overnight so may be in part dilutional   - Iron studies, folate, B12 unremarkable. She takes oral iron and folate per home regimen which will be continued. - may be related to CKD and may benefit from procrit in future with any worsening - defer to pcp  02/13/2021    Acute renal failure (ARF) ([facility] [address] [N17.9]  - see under CKD 02/12/2021         Disposition: home    Discharged Condition: Stable    Activity: activity as tolerated  Diet: cardiac diet, diabetic diet and low potassium   Follow Up: Primary Care Physician in one week  Monitor your blood sugar at least twice daily and write down readings to take to pcp   Monitor your blood pressure daily and write down readings to take to pcp. Due to low blood pressure and high potassium during hospitalization your losartan was stopped. Discuss with your pcp at next visit. Take lasix 20 mg as needed for worsening swelling in your legs or weight gain > 3 lbs.     Discharge Medications:      Current Discharge Medication List Details   cefdinir (OMNICEF) 300 MG capsule Take 1 capsule by mouth 2 times daily for 5 days  Qty: 10 capsule, Refills: 0              Details   furosemide (LASIX) 20 MG tablet Take 1 tablet by mouth daily as needed (worsening swelling or weight gain > 3 lbs) Take 20mg by mouth every day for 4-5 days and stop for lower extremity edema  Qty: 60 tablet, Refills: 3      insulin lispro (HUMALOG) 100 UNIT/ML injection vial Inject 5 Units into the skin 3 times daily (before meals)  Qty: 1 vial, Refills: 3              Details   omeprazole (PRILOSEC) 40 MG delayed release capsule Take 40 mg by mouth daily      cyclobenzaprine (FLEXERIL) 10 MG tablet Take 10 mg by mouth 3 times daily as needed for Muscle spasms      ferrous sulfate (IRON 325) 325 (65 Fe) MG tablet Take 325 mg by mouth daily (with breakfast)      amLODIPine (NORVASC) 10 MG tablet Take 10 mg by mouth daily      folic acid (FOLVITE) 1 MG tablet Take 1 mg by mouth daily      fenofibrate (TRIGLIDE) 160 MG tablet Take 160 mg by mouth daily      atorvastatin (LIPITOR) 80 MG tablet Take 80 mg by mouth daily      clopidogrel (PLAVIX) 75 MG tablet Take 75 mg by mouth daily      docusate sodium (COLACE) 100 MG capsule Take 100 mg by mouth 2 times daily as needed for Constipation      doxepin (SINEQUAN) 50 MG capsule Take 50 mg by mouth nightly      Calcium Carb-Cholecalciferol (CALCIUM+D3 PO) Take by mouth daily      mometasone-formoterol (DULERA) 200-5 MCG/ACT inhaler Inhale 2 puffs into the lungs 2 times daily      loratadine (CLARITIN) 10 MG tablet Take 10 mg by mouth daily      fluticasone (FLONASE) 50 MCG/ACT nasal spray 1 spray by Nasal route daily Each nostril daily      carvedilol (COREG) 25 MG tablet Take 25 mg by mouth 2 times daily (with meals). montelukast (SINGULAIR) 10 MG tablet Take 10 mg by mouth nightly. levothyroxine (SYNTHROID) 75 MCG tablet Take 75 mcg by mouth Daily. aspirin 81 MG chewable tablet Take 81 mg by mouth daily. oxyCODONE-acetaminophen (PERCOCET) 7.5-325 MG per tablet Take 1 tablet by mouth every 8 hours as needed. Insulin Glargine (LANTUS SC) Inject 75 Units into the skin daily       ondansetron (ZOFRAN ODT) 4 MG disintegrating tablet Take 1 tablet by mouth every 8 hours as needed for Nausea or Vomiting  Qty: 15 tablet, Refills: 0      nitroGLYCERIN (NITROSTAT) 0.4 MG SL tablet Take with onset of chest pain, up to max of 3 total doses 5 min apart. If no relief after 3 doses, call 911 or go to the Emergency Room  Qty: 25 tablet, Refills: 0            Patient was seen and examined by Dr. Castillo Corley and plan of care reviewed. Signed:  Electronically signed by MAGGIE Akers on 2/13/2021 at 12:12 PM       Thank you JULITO Corral for the opportunity to be involved in this patient's care. If you have any questions or concerns please feel free to contact me at [phone].

## 2021-02-13 NOTE — FLOWSHEET NOTE
02/13/21 0930   Assessment   Charting Type Shift assessment   Neurological   Neuro (WDL) WDL   Level of Consciousness Alert (0)   Desmond Coma Scale   Eye Opening 4   Best Verbal Response 5   Best Motor Response 6   Desmond Coma Scale Score 15   HEENT   Right Eye Impaired vision   Left Eye Impaired vision   Breath Sounds   Right Upper Lobe Clear;Diminished   Right Middle Lobe Clear;Diminished   Right Lower Lobe Clear;Diminished   Left Upper Lobe Clear;Diminished   Left Lower Lobe Clear;Diminished   Cardiac   Cardiac (WDL) WDL   Cardiac Monitor   Telemetry Monitor On No   Telemetry Audible Yes   Telemetry Alarms Set Yes   Gastrointestinal   Abdominal (WDL) X   Abdomen Inspection Rounded   RUQ Bowel Sounds Active   LUQ Bowel Sounds Active   RLQ Bowel Sounds Active   LLQ Bowel Sounds Active   Peripheral Vascular   Peripheral Vascular (WDL) WDL   Skin Color/Condition   Skin Color/Condition (WDL) WDL   Skin Integrity   Skin Integrity (WDL) WDL   Musculoskeletal   Musculoskeletal (WDL) X   RUE Full movement   LUE Full movement   RL Extremity Weakness   LL Extremity Weakness   Genitourinary   Genitourinary (WDL) X   Flank Tenderness   (incontinent)   Urine Assessment   Incontinence Yes   [REMOVED] Urethral Catheter Straight-tip 16 fr   Removal Date/Time: 02/13/21 0900  Placement Date/Time: 02/12/21 1132   Urethral Catheter Timeout: Patient  Catheter Type: Straight-tip  Tube Size (fr): 16 fr  Catheter Balloon Size: 10 mL  Collection Container: Standard  Securement Method: Securing de. ..    Catheter Indications Acute urinary retention/obstruction   Urine Color Yellow   Urine Appearance Clear   Psychosocial   Psychosocial (WDL) WDL
FC intact. Pt reports that she sometimes uses a cane to ambulate at home. She is concerned with her length of stay due the fact that she cares for her  and adult son. Pt oriented to room. Pt call bell and bedside table within reach. Will continue to monitor pt.

## 2021-02-13 NOTE — PLAN OF CARE
Problem: Falls - Risk of:  Goal: Will remain free from falls  Description: Will remain free from falls  Outcome: Ongoing     Problem: Respiratory  Goal: No pulmonary complications  1/37/8529 0013 by Boston Lopes RN  Outcome: Ongoing     Problem:   Goal: Adequate urinary output  Outcome: Ongoing     Problem: Musculor/Skeletal Functional Status  Goal: Absence of falls  2/13/2021 0013 by Boston Lopes RN  Outcome: Ongoing

## 2021-02-13 NOTE — H&P
Short Stay Summary      Patient ID: Savanah Dunn      Patient's PCP: Olu San Date: 2/12/2021     Discharge Date:   2/13/2021    Admitting Physician: Jimenez Pritchard MD    Discharge Physician: MAGGIE Main     Reason for this admission:   Abnormal labs  Fatigue     Discharge Diagnoses: Active Hospital Problems    Diagnosis Date Noted    Type 2 diabetes mellitus, with long-term current use of insulin ([facility] [address] [E11.9, Z79.4] 02/13/2021    Other specified hypothyroidism [E03.8] 02/13/2021    Stage 3 chronic kidney disease [N18.30] 02/13/2021    Obesity (BMI 30-39. 9) [E66.9] 02/13/2021    Hyperkalemia [E87.5] 02/13/2021    Benign essential HTN [I10] 02/13/2021    Chronic bronchitis ([address] Demetrio Boom 02/13/2021    Acute cystitis without hematuria [N30.00] 02/13/2021    Anemia [D64.9] 02/13/2021    Acute renal failure (ARF) ([address] [N17.9] 02/12/2021       Procedures:  XR CHEST PORTABLE   Final Result      No acute pulmonary pathology or change from the prior exam                  CT Head WO Contrast   Final Result      Age-related changes with tiny white matter infarct on the right, age indeterminate      No acute intracranial hemorrhage or signs of mass effect            Consults:   None    HISTORY OF PRESENT ILLNESS:   The patient is a 79 y.o. female with PMH of obesity, chronic kidney disease, CAD, diabetes, hypertension, hypothyroidism, hyperlipidemia who presents due to abnormal labs. States that her doctor called and told her to go to the ER because her potassium was high and her kidney numbers were bad. Patient states she has been more fatigued than usual over the last few days and \"not feeling quite right\" but no specific complaint. Denies fever, sweats, chills. Denies dysuria or urinary frequency. No sick contacts. States she feels fine today and would like to go home. States she feels she has more energy today. Seems to be at her baseline.  Reports her  is being discharged from a nursing home today and she wants to be home to receive him. Review of system  Constitutional:  Denies fever or chills. Positive for fatigue. Eyes:  Denies change in visual acuity or discharge. HENT:  Denies nasal congestion or sore throat. Respiratory:  Denies cough or shortness of breath. Cardiovascular:  Denies chest pain, palpitation or swelling in LEs. GI:  Denies abdominal pain, nausea, vomiting, bloody stools or diarrhea. :  Denies dysuria or frequency. Musculoskeletal:  Positive for chronic arthralgias. No acute back pain. .   Integument:  Denies rash or itching. Neurologic:  Denies headache, focal weakness or sensory changes. Psychiatric:  Denies depression or anxiety. Past Medical History:      Diagnosis Date    Arthritis     CAD (coronary artery disease)     Chronic kidney disease     ? low functioning per pt    Diabetes mellitus ([facility] [address]     Hyperlipidemia     Hypertension     Thyroid disease        Past Surgical History:      Procedure Laterality Date    CARDIAC SURGERY      3 stents in  2018    CAROTID ENDARTERECTOMY Right      SECTION      CHOLECYSTECTOMY      COLONOSCOPY      HYSTERECTOMY         Social History:   TOBACCO:   reports that she has quit smoking. She has never used smokeless tobacco.  ETOH:   reports no history of alcohol use. OCCUPATION:  None    Family History:   History reviewed. No pertinent family history. Allergies:  Patient has no known allergies. Medications Prior to Admission:    Prior to Admission medications    Medication Sig Start Date End Date Taking?  Authorizing Provider   cefdinir (OMNICEF) 300 MG capsule Take 1 capsule by mouth 2 times daily for 5 days 21 Yes MAGGIE Haney   furosemide (LASIX) 20 MG tablet Take 1 tablet by mouth daily as needed (worsening swelling or weight gain > 3 lbs) Take 20mg by mouth every day for 4-5 days and stop for lower extremity edema 21  Yes MAGGIE Haney insulin lispro (HUMALOG) 100 UNIT/ML injection vial Inject 5 Units into the skin 3 times daily (before meals) 2/13/21  Yes MAGGIE Kaplan   omeprazole (PRILOSEC) 40 MG delayed release capsule Take 40 mg by mouth daily   Yes Historical Provider, MD   cyclobenzaprine (FLEXERIL) 10 MG tablet Take 10 mg by mouth 3 times daily as needed for Muscle spasms   Yes Historical Provider, MD   ferrous sulfate (IRON 325) 325 (65 Fe) MG tablet Take 325 mg by mouth daily (with breakfast)   Yes Historical Provider, MD   amLODIPine (NORVASC) 10 MG tablet Take 10 mg by mouth daily   Yes Historical Provider, MD   folic acid (FOLVITE) 1 MG tablet Take 1 mg by mouth daily   Yes Historical Provider, MD   fenofibrate (TRIGLIDE) 160 MG tablet Take 160 mg by mouth daily   Yes Historical Provider, MD   atorvastatin (LIPITOR) 80 MG tablet Take 80 mg by mouth daily   Yes Historical Provider, MD   clopidogrel (PLAVIX) 75 MG tablet Take 75 mg by mouth daily   Yes Historical Provider, MD   docusate sodium (COLACE) 100 MG capsule Take 100 mg by mouth 2 times daily as needed for Constipation   Yes Historical Provider, MD   doxepin (SINEQUAN) 50 MG capsule Take 50 mg by mouth nightly   Yes Historical Provider, MD   Calcium Carb-Cholecalciferol (CALCIUM+D3 PO) Take by mouth daily   Yes Historical Provider, MD   mometasone-formoterol (DULERA) 200-5 MCG/ACT inhaler Inhale 2 puffs into the lungs 2 times daily   Yes Historical Provider, MD   loratadine (CLARITIN) 10 MG tablet Take 10 mg by mouth daily   Yes Historical Provider, MD   fluticasone (FLONASE) 50 MCG/ACT nasal spray 1 spray by Nasal route daily Each nostril daily   Yes Historical Provider, MD   carvedilol (COREG) 25 MG tablet Take 25 mg by mouth 2 times daily (with meals). Yes Historical Provider, MD   montelukast (SINGULAIR) 10 MG tablet Take 10 mg by mouth nightly. Yes Historical Provider, MD   levothyroxine (SYNTHROID) 75 MCG tablet Take 75 mcg by mouth Daily.    Yes Historical Provider, MD   aspirin 81 MG chewable tablet Take 81 mg by mouth daily. Yes Historical Provider, MD   oxyCODONE-acetaminophen (PERCOCET) 7.5-325 MG per tablet Take 1 tablet by mouth every 8 hours as needed. Yes Historical Provider, MD   Insulin Glargine (LANTUS SC) Inject 75 Units into the skin daily    Yes Historical Provider, MD   ondansetron (ZOFRAN ODT) 4 MG disintegrating tablet Take 1 tablet by mouth every 8 hours as needed for Nausea or Vomiting 5/27/19   Sixto Matthews DO   nitroGLYCERIN (NITROSTAT) 0.4 MG SL tablet Take with onset of chest pain, up to max of 3 total doses 5 min apart. If no relief after 3 doses, call 911 or go to the Emergency Room 1/5/19   [facility] SHIV Keita DO       Vital Signs  Temp: 98 °F (36.7 °C)  Pulse: 63  Resp: 16  BP: (!) 125/51  SpO2: 96 %  O2 Device: None (Room air)       Vital signs reviewed in electronic chart. Physical exam  Constitutional:  Well developed, well nourished, no acute distress. Obese female lying in bed. Eyes:  PERRL, conjunctiva normal, EOMI. HENT:  Atraumatic, external ears normal, external nose/nares normal, oropharynx moist, no pharyngeal exudates. Neck:  Supple. No JVD or thyromegaly. Respiratory:  No respiratory distress on room air, normal breath sounds, no rales, no wheezing. Cardiovascular:  Normal rate, normal rhythm, no murmurs, no gallops, no rubs. GI:  Soft, obese, nondistended, normal bowel sounds, nontender, no organomegaly, no mass. Musculoskeletal:  No edema, no tenderness, no obvious deformities. Patient is moving all extremities. Integument:  Well hydrated, no rash. Neurologic:  Alert & oriented x 3,  no focal deficits noted. Strength is equal throughout. Psychiatric:  Speech and behavior appropriate.       Lab Results   Component Value Date    WBC 5.6 02/13/2021    HGB 9.7 (L) 02/13/2021    HCT 30.9 (L) 02/13/2021    MCV 96.3 02/13/2021     02/13/2021       Lab Results   Component Value Date     02/13/2021    K 4.2 told her to report to ER due to hyperkalemia. - K 5.1 on arrival to ER.   - Losartan held with ARF, hyperkalemia, and low BP   - hydrated with IVFs and encourage low potassium diet   - K 4.2 2/13   - f/u with pcp and repeat labs as outpatient 02/13/2021    Benign essential HTN [I10]  - Continue home amloidipine, coreg, and lasix on as needed basis  - continue to hold losartan - /51 today without losartan or diuretic   - f/u with pcp   - encouraged patient to monitor bp daily at home and write down readings to take to pcp  02/13/2021    Chronic bronchitis (HCC) [J42]  - history of COPD. No acute exacerbation. Resume home inhaler regimen. On room air.  02/13/2021    Acute cystitis without hematuria [N30.00]  - UA with evidence of UTI.   - Rocephin while inpatient and will dc with omnicef  - follow urine culture and notify patient with any necessary medication change 02/13/2021    Anemia [D64.9]  - Hgb 9.7 2/13  - she did receive IVFs in ED and overnight so may be in part dilutional   - Iron studies, folate, B12 unremarkable. She takes oral iron and folate per home regimen which will be continued. - may be related to CKD and may benefit from procrit in future with any worsening - defer to pcp  02/13/2021    Acute renal failure (ARF) ([facility] [address] [N17.9]  - see under CKD 02/12/2021         Disposition: home    Discharged Condition: Stable    Activity: activity as tolerated  Diet: cardiac diet, diabetic diet and low potassium   Follow Up: Primary Care Physician in one week  Monitor your blood sugar at least twice daily and write down readings to take to pcp   Monitor your blood pressure daily and write down readings to take to pcp. Due to low blood pressure and high potassium during hospitalization your losartan was stopped. Discuss with your pcp at next visit. Take lasix 20 mg as needed for worsening swelling in your legs or weight gain > 3 lbs.     Discharge Medications:      Current Discharge Medication List Details   cefdinir (OMNICEF) 300 MG capsule Take 1 capsule by mouth 2 times daily for 5 days  Qty: 10 capsule, Refills: 0              Details   furosemide (LASIX) 20 MG tablet Take 1 tablet by mouth daily as needed (worsening swelling or weight gain > 3 lbs) Take 20mg by mouth every day for 4-5 days and stop for lower extremity edema  Qty: 60 tablet, Refills: 3      insulin lispro (HUMALOG) 100 UNIT/ML injection vial Inject 5 Units into the skin 3 times daily (before meals)  Qty: 1 vial, Refills: 3              Details   omeprazole (PRILOSEC) 40 MG delayed release capsule Take 40 mg by mouth daily      cyclobenzaprine (FLEXERIL) 10 MG tablet Take 10 mg by mouth 3 times daily as needed for Muscle spasms      ferrous sulfate (IRON 325) 325 (65 Fe) MG tablet Take 325 mg by mouth daily (with breakfast)      amLODIPine (NORVASC) 10 MG tablet Take 10 mg by mouth daily      folic acid (FOLVITE) 1 MG tablet Take 1 mg by mouth daily      fenofibrate (TRIGLIDE) 160 MG tablet Take 160 mg by mouth daily      atorvastatin (LIPITOR) 80 MG tablet Take 80 mg by mouth daily      clopidogrel (PLAVIX) 75 MG tablet Take 75 mg by mouth daily      docusate sodium (COLACE) 100 MG capsule Take 100 mg by mouth 2 times daily as needed for Constipation      doxepin (SINEQUAN) 50 MG capsule Take 50 mg by mouth nightly      Calcium Carb-Cholecalciferol (CALCIUM+D3 PO) Take by mouth daily      mometasone-formoterol (DULERA) 200-5 MCG/ACT inhaler Inhale 2 puffs into the lungs 2 times daily      loratadine (CLARITIN) 10 MG tablet Take 10 mg by mouth daily      fluticasone (FLONASE) 50 MCG/ACT nasal spray 1 spray by Nasal route daily Each nostril daily      carvedilol (COREG) 25 MG tablet Take 25 mg by mouth 2 times daily (with meals). montelukast (SINGULAIR) 10 MG tablet Take 10 mg by mouth nightly. levothyroxine (SYNTHROID) 75 MCG tablet Take 75 mcg by mouth Daily. aspirin 81 MG chewable tablet Take 81 mg by mouth daily. oxyCODONE-acetaminophen (PERCOCET) 7.5-325 MG per tablet Take 1 tablet by mouth every 8 hours as needed. Insulin Glargine (LANTUS SC) Inject 75 Units into the skin daily       ondansetron (ZOFRAN ODT) 4 MG disintegrating tablet Take 1 tablet by mouth every 8 hours as needed for Nausea or Vomiting  Qty: 15 tablet, Refills: 0      nitroGLYCERIN (NITROSTAT) 0.4 MG SL tablet Take with onset of chest pain, up to max of 3 total doses 5 min apart. If no relief after 3 doses, call 911 or go to the Emergency Room  Qty: 25 tablet, Refills: 0            Patient was seen and examined by Dr. Herb Bar and plan of care reviewed. Signed:  Electronically signed by MAGGIE Hall on 2/13/2021 at 12:12 PM       Thank you JULITO Alejandro for the opportunity to be involved in this patient's care. If you have any questions or concerns please feel free to contact me at [phone].

## 2021-02-14 LAB
ORGANISM: ABNORMAL
URINE CULTURE, ROUTINE: ABNORMAL

## 2021-02-15 LAB
EKG ATRIAL RATE: 62 BPM
EKG DIAGNOSIS: NORMAL
EKG P AXIS: 57 DEGREES
EKG P-R INTERVAL: 260 MS
EKG Q-T INTERVAL: 420 MS
EKG QRS DURATION: 82 MS
EKG QTC CALCULATION (BAZETT): 426 MS
EKG R AXIS: 3 DEGREES
EKG T AXIS: 69 DEGREES
EKG VENTRICULAR RATE: 62 BPM
VITAMIN D 25-HYDROXY: 10.3 (ref 32–100)

## 2021-02-23 ENCOUNTER — TELEPHONE (OUTPATIENT)
Dept: MEDSURG UNIT | Facility: HOSPITAL | Age: 81
End: 2021-02-23

## 2021-02-23 NOTE — PROGRESS NOTES
Tried calling patient multiple times about medication  called in for patient (V-D). No answer. Called family members no answer.

## 2021-10-11 ENCOUNTER — TRANSCRIBE ORDERS (OUTPATIENT)
Dept: ADMINISTRATIVE | Facility: HOSPITAL | Age: 81
End: 2021-10-11

## 2021-10-11 DIAGNOSIS — R42 DIZZINESS: Primary | ICD-10-CM

## 2021-10-21 ENCOUNTER — HOSPITAL ENCOUNTER (OUTPATIENT)
Dept: CT IMAGING | Facility: HOSPITAL | Age: 81
Discharge: HOME | End: 2021-10-21
Admitting: INTERNAL MEDICINE

## 2021-10-21 DIAGNOSIS — R42 DIZZINESS: ICD-10-CM

## 2021-10-21 PROCEDURE — 70450 CT HEAD/BRAIN W/O DYE: CPT

## 2022-02-25 ENCOUNTER — HOSPITAL ENCOUNTER (OUTPATIENT)
Facility: HOSPITAL | Age: 82
Discharge: HOME | End: 2022-02-25
Payer: MEDICARE

## 2022-02-25 LAB
ALBUMIN SERPL-MCNC: 3.7 G/DL (ref 3.4–4.8)
ANION GAP SERPL CALCULATED.3IONS-SCNC: 12 MMOL/L (ref 3–16)
BUN BLDV-MCNC: 30 MG/DL (ref 6–20)
CALCIUM SERPL-MCNC: 9.1 MG/DL (ref 8.5–10.5)
CHLORIDE BLD-SCNC: 100 MMOL/L (ref 98–107)
CO2: 27 MMOL/L (ref 20–30)
CREAT SERPL-MCNC: 1.4 MG/DL (ref 0.4–1.2)
GFR AFRICAN AMERICAN: 44
GFR NON-AFRICAN AMERICAN: 36
GLUCOSE BLD-MCNC: 268 MG/DL (ref 74–106)
HCT VFR BLD CALC: 34.2 % (ref 37–47)
HEMOGLOBIN: 11.1 G/DL (ref 11.5–16.5)
MCH RBC QN AUTO: 30.8 PG (ref 27–32)
MCHC RBC AUTO-ENTMCNC: 32.5 G/DL (ref 31–35)
MCV RBC AUTO: 95 FL (ref 80–100)
PDW BLD-RTO: 12.3 % (ref 11–16)
PHOSPHORUS: 4.2 MG/DL (ref 2.5–4.5)
PLATELET # BLD: 279 K/UL (ref 150–400)
PMV BLD AUTO: 9.2 FL (ref 6–10)
POTASSIUM SERPL-SCNC: 5.8 MMOL/L (ref 3.4–5.1)
RBC # BLD: 3.6 M/UL (ref 3.8–5.8)
SODIUM BLD-SCNC: 139 MMOL/L (ref 136–145)
URIC ACID, SERUM: 5.4 MG/DL (ref 2.5–7.1)
WBC # BLD: 6.4 K/UL (ref 4–11)

## 2022-02-25 PROCEDURE — 80069 RENAL FUNCTION PANEL: CPT

## 2022-02-25 PROCEDURE — 36415 COLL VENOUS BLD VENIPUNCTURE: CPT

## 2022-02-25 PROCEDURE — 83970 ASSAY OF PARATHORMONE: CPT

## 2022-02-25 PROCEDURE — 84550 ASSAY OF BLOOD/URIC ACID: CPT

## 2022-02-25 PROCEDURE — 85027 COMPLETE CBC AUTOMATED: CPT

## 2022-02-26 LAB — PARATHYROID HORMONE INTACT: 65.1 PG/ML (ref 14–72)

## 2022-05-03 ENCOUNTER — TELEPHONE (OUTPATIENT)
Dept: SURGERY | Facility: CLINIC | Age: 82
End: 2022-05-03

## 2022-05-25 ENCOUNTER — HOSPITAL ENCOUNTER (EMERGENCY)
Facility: HOSPITAL | Age: 82
Discharge: HOME | End: 2022-05-26
Attending: EMERGENCY MEDICINE
Payer: MEDICARE

## 2022-05-25 ENCOUNTER — APPOINTMENT (OUTPATIENT)
Dept: CT IMAGING | Facility: HOSPITAL | Age: 82
End: 2022-05-25
Payer: MEDICARE

## 2022-05-25 DIAGNOSIS — R11.2 NON-INTRACTABLE VOMITING WITH NAUSEA, UNSPECIFIED VOMITING TYPE: ICD-10-CM

## 2022-05-25 DIAGNOSIS — N39.0 ACUTE UTI: ICD-10-CM

## 2022-05-25 DIAGNOSIS — R10.84 GENERALIZED ABDOMINAL PAIN: Primary | ICD-10-CM

## 2022-05-25 LAB
A/G RATIO: 1.1 (ref 0.8–2)
ALBUMIN SERPL-MCNC: 4 G/DL (ref 3.4–4.8)
ALP BLD-CCNC: 74 U/L (ref 25–100)
ALT SERPL-CCNC: 12 U/L (ref 4–36)
ANION GAP SERPL CALCULATED.3IONS-SCNC: 11 MMOL/L (ref 3–16)
AST SERPL-CCNC: 20 U/L (ref 8–33)
BACTERIA: ABNORMAL /HPF
BASOPHILS ABSOLUTE: 0 K/UL (ref 0–0.1)
BASOPHILS RELATIVE PERCENT: 0.3 %
BILIRUB SERPL-MCNC: 0.5 MG/DL (ref 0.3–1.2)
BILIRUBIN URINE: NEGATIVE
BLOOD, URINE: ABNORMAL
BUN BLDV-MCNC: 38 MG/DL (ref 6–20)
CALCIUM SERPL-MCNC: 9.5 MG/DL (ref 8.5–10.5)
CHLORIDE BLD-SCNC: 97 MMOL/L (ref 98–107)
CLARITY: ABNORMAL
CO2: 24 MMOL/L (ref 20–30)
COLOR: YELLOW
CREAT SERPL-MCNC: 1.4 MG/DL (ref 0.4–1.2)
EOSINOPHILS ABSOLUTE: 0.1 K/UL (ref 0–0.4)
EOSINOPHILS RELATIVE PERCENT: 0.9 %
EPITHELIAL CELLS, UA: ABNORMAL /HPF (ref 0–5)
GFR AFRICAN AMERICAN: 43
GFR NON-AFRICAN AMERICAN: 36
GLOBULIN: 3.5 G/DL
GLUCOSE BLD-MCNC: 357 MG/DL (ref 74–106)
GLUCOSE URINE: >=1000 MG/DL
HCT VFR BLD CALC: 33.8 % (ref 37–47)
HEMOGLOBIN: 11.2 G/DL (ref 11.5–16.5)
IMMATURE GRANULOCYTES #: 0.1 K/UL
IMMATURE GRANULOCYTES %: 0.4 % (ref 0–5)
KETONES, URINE: NEGATIVE MG/DL
LEUKOCYTE ESTERASE, URINE: ABNORMAL
LIPASE: 14 U/L (ref 5.6–51.3)
LYMPHOCYTES ABSOLUTE: 1.5 K/UL (ref 1.5–4)
LYMPHOCYTES RELATIVE PERCENT: 9.7 %
MCH RBC QN AUTO: 31.3 PG (ref 27–32)
MCHC RBC AUTO-ENTMCNC: 33.1 G/DL (ref 31–35)
MCV RBC AUTO: 94.4 FL (ref 80–100)
MICROSCOPIC EXAMINATION: YES
MONOCYTES ABSOLUTE: 0.8 K/UL (ref 0.2–0.8)
MONOCYTES RELATIVE PERCENT: 5.4 %
NEUTROPHILS ABSOLUTE: 12.8 K/UL (ref 2–7.5)
NEUTROPHILS RELATIVE PERCENT: 83.3 %
NITRITE, URINE: POSITIVE
PDW BLD-RTO: 12.4 % (ref 11–16)
PH UA: 7 (ref 5–8)
PLATELET # BLD: 244 K/UL (ref 150–400)
PMV BLD AUTO: 10.2 FL (ref 6–10)
POTASSIUM REFLEX MAGNESIUM: 5.6 MMOL/L (ref 3.4–5.1)
PROTEIN UA: 100 MG/DL
RBC # BLD: 3.58 M/UL (ref 3.8–5.8)
RBC UA: ABNORMAL /HPF (ref 0–4)
SODIUM BLD-SCNC: 132 MMOL/L (ref 136–145)
SPECIFIC GRAVITY UA: 1.02 (ref 1–1.03)
TOTAL PROTEIN: 7.5 G/DL (ref 6.4–8.3)
URINE REFLEX TO CULTURE: YES
URINE TYPE: ABNORMAL
UROBILINOGEN, URINE: 0.2 E.U./DL
WBC # BLD: 15.3 K/UL (ref 4–11)
WBC UA: ABNORMAL /HPF (ref 0–5)

## 2022-05-25 PROCEDURE — 99284 EMERGENCY DEPT VISIT MOD MDM: CPT

## 2022-05-25 PROCEDURE — 83690 ASSAY OF LIPASE: CPT

## 2022-05-25 PROCEDURE — 87086 URINE CULTURE/COLONY COUNT: CPT

## 2022-05-25 PROCEDURE — 80053 COMPREHEN METABOLIC PANEL: CPT

## 2022-05-25 PROCEDURE — 96375 TX/PRO/DX INJ NEW DRUG ADDON: CPT

## 2022-05-25 PROCEDURE — 96365 THER/PROPH/DIAG IV INF INIT: CPT

## 2022-05-25 PROCEDURE — C9113 INJ PANTOPRAZOLE SODIUM, VIA: HCPCS | Performed by: EMERGENCY MEDICINE

## 2022-05-25 PROCEDURE — 36415 COLL VENOUS BLD VENIPUNCTURE: CPT

## 2022-05-25 PROCEDURE — 85025 COMPLETE CBC W/AUTO DIFF WBC: CPT

## 2022-05-25 PROCEDURE — 81001 URINALYSIS AUTO W/SCOPE: CPT

## 2022-05-25 PROCEDURE — 6360000002 HC RX W HCPCS: Performed by: EMERGENCY MEDICINE

## 2022-05-25 PROCEDURE — 74176 CT ABD & PELVIS W/O CONTRAST: CPT

## 2022-05-25 PROCEDURE — 2580000003 HC RX 258: Performed by: EMERGENCY MEDICINE

## 2022-05-25 RX ORDER — 0.9 % SODIUM CHLORIDE 0.9 %
1000 INTRAVENOUS SOLUTION INTRAVENOUS ONCE
Status: COMPLETED | OUTPATIENT
Start: 2022-05-25 | End: 2022-05-25

## 2022-05-25 RX ORDER — CEPHALEXIN 500 MG/1
500 CAPSULE ORAL 2 TIMES DAILY
COMMUNITY

## 2022-05-25 RX ORDER — MORPHINE SULFATE 4 MG/ML
4 INJECTION, SOLUTION INTRAMUSCULAR; INTRAVENOUS ONCE
Status: COMPLETED | OUTPATIENT
Start: 2022-05-25 | End: 2022-05-25

## 2022-05-25 RX ORDER — ONDANSETRON 2 MG/ML
4 INJECTION INTRAMUSCULAR; INTRAVENOUS ONCE
Status: COMPLETED | OUTPATIENT
Start: 2022-05-25 | End: 2022-05-25

## 2022-05-25 RX ADMIN — MORPHINE SULFATE 4 MG: 4 INJECTION INTRAVENOUS at 22:02

## 2022-05-25 RX ADMIN — SODIUM CHLORIDE 40 MG: 9 INJECTION, SOLUTION INTRAVENOUS at 22:07

## 2022-05-25 RX ADMIN — SODIUM CHLORIDE 1000 ML: 9 INJECTION, SOLUTION INTRAVENOUS at 22:01

## 2022-05-25 RX ADMIN — ONDANSETRON 4 MG: 2 INJECTION INTRAMUSCULAR; INTRAVENOUS at 22:02

## 2022-05-25 ASSESSMENT — PAIN DESCRIPTION - LOCATION: LOCATION: BACK;ABDOMEN

## 2022-05-25 ASSESSMENT — PAIN SCALES - GENERAL: PAINLEVEL_OUTOF10: 9

## 2022-05-26 VITALS
WEIGHT: 201 LBS | HEART RATE: 73 BPM | RESPIRATION RATE: 20 BRPM | HEIGHT: 60 IN | OXYGEN SATURATION: 96 % | DIASTOLIC BLOOD PRESSURE: 56 MMHG | BODY MASS INDEX: 39.46 KG/M2 | SYSTOLIC BLOOD PRESSURE: 153 MMHG

## 2022-05-26 PROCEDURE — 2580000003 HC RX 258: Performed by: EMERGENCY MEDICINE

## 2022-05-26 PROCEDURE — 6360000002 HC RX W HCPCS: Performed by: EMERGENCY MEDICINE

## 2022-05-26 RX ORDER — PHENAZOPYRIDINE HYDROCHLORIDE 200 MG/1
200 TABLET, FILM COATED ORAL 3 TIMES DAILY PRN
Qty: 9 TABLET | Refills: 0 | Status: SHIPPED | OUTPATIENT
Start: 2022-05-26 | End: 2022-05-29

## 2022-05-26 RX ADMIN — CEFTRIAXONE 1000 MG: 1 INJECTION, POWDER, FOR SOLUTION INTRAMUSCULAR; INTRAVENOUS at 00:11

## 2022-05-26 ASSESSMENT — PAIN SCALES - GENERAL: PAINLEVEL_OUTOF10: 4

## 2022-05-26 NOTE — ED PROVIDER NOTES
[address] ENCOUNTER      Pt Name: Dona Antonio  MRN: [id number]  YOB: 1940  Date of evaluation: 5/25/2022  Provider: Scooter Lei MD    CHIEF COMPLAINT       Chief Complaint   Patient presents with    Abdominal Pain     Pt c/o low abp 10/10, worse after urination; dx with kidney infection today    Back Pain         HISTORY OF PRESENT ILLNESS  (Location/Symptom, Timing/Onset, Context/Setting, Quality, Duration, Modifying Factors, Severity.)   Dona Antonio is a 79 y.o. female who presents to the emergency department with a complaint of a several day history of diffuse abdominal pain associated with intermittent nausea and vomiting. Patient also states that she has urinary frequency and urgency. Patient went to Dr. Abhijeet Moreno her nephrologist today and noted to have a urinary tract infection and was placed on Keflex and just started today. Patient states that she already has pain medicine. Patient states that she is also has some abdominal distention associated with it. Patient denies any chest pain shortness of breath or any focal signs or symptoms. Patient states that her symptoms got progressively worse so she called EMS to bring her to the hospital for further evaluation definitive care. Nursing notes were reviewed.     REVIEW OFSYSTEMS    (2-9 systems for level 4, 10 or more for level 5)   ROS:  General:  No fevers, no chills, no weakness  Cardiovascular:  No chest pain, no palpitations  Respiratory:  No shortness of breath, no cough, no wheezing  Gastrointestinal: Diffuse abdominal pain associated nausea and vomiting  Musculoskeletal:  No muscle pain, no joint pain  Skin:  No rash, no easy bruising  Neurologic:  No speech problems, no headache, no extremity weakness  Psychiatric:  No anxiety  Genitourinary: Urinary frequency and urgency    Except as noted above the remainder of the review of systems was reviewed and negative. PAST MEDICAL HISTORY     Past Medical History:   Diagnosis Date    Arthritis     CAD (coronary artery disease)     Chronic kidney disease     ? low functioning per pt    Diabetes mellitus ([address]     Hyperlipidemia     Hypertension     Thyroid disease          SURGICAL HISTORY       Past Surgical History:   Procedure Laterality Date    CARDIAC SURGERY      3 stents in  2018    CAROTID ENDARTERECTOMY Right      SECTION      CHOLECYSTECTOMY      COLONOSCOPY      HYSTERECTOMY           CURRENT MEDICATIONS       Previous Medications    AMLODIPINE (NORVASC) 10 MG TABLET    Take 10 mg by mouth daily    ASPIRIN 81 MG CHEWABLE TABLET    Take 81 mg by mouth daily. ATORVASTATIN (LIPITOR) 80 MG TABLET    Take 80 mg by mouth daily    CALCIUM CARB-CHOLECALCIFEROL (CALCIUM+D3 PO)    Take by mouth daily    CARVEDILOL (COREG) 25 MG TABLET    Take 25 mg by mouth 2 times daily (with meals).     CEPHALEXIN (KEFLEX) 500 MG CAPSULE    Take 500 mg by mouth 2 times daily    CLOPIDOGREL (PLAVIX) 75 MG TABLET    Take 75 mg by mouth daily    CYCLOBENZAPRINE (FLEXERIL) 10 MG TABLET    Take 10 mg by mouth 3 times daily as needed for Muscle spasms    DOCUSATE SODIUM (COLACE) 100 MG CAPSULE    Take 100 mg by mouth 2 times daily as needed for Constipation    DOXEPIN (SINEQUAN) 50 MG CAPSULE    Take 50 mg by mouth nightly    FENOFIBRATE (TRIGLIDE) 160 MG TABLET    Take 160 mg by mouth daily    FERROUS SULFATE (IRON 325) 325 (65 FE) MG TABLET    Take 325 mg by mouth daily (with breakfast)    FLUTICASONE (FLONASE) 50 MCG/ACT NASAL SPRAY    1 spray by Nasal route daily Each nostril daily    FOLIC ACID (FOLVITE) 1 MG TABLET    Take 1 mg by mouth daily    FUROSEMIDE (LASIX) 20 MG TABLET    Take 1 tablet by mouth daily as needed (worsening swelling or weight gain > 3 lbs) Take 20mg by mouth every day for 4-5 days and stop for lower extremity edema    INSULIN GLARGINE (LANTUS SC)    Inject 75 Units into the skin daily     INSULIN LISPRO (HUMALOG) 100 UNIT/ML INJECTION VIAL    Inject 5 Units into the skin 3 times daily (before meals)    LEVOTHYROXINE (SYNTHROID) 75 MCG TABLET    Take 75 mcg by mouth Daily. LORATADINE (CLARITIN) 10 MG TABLET    Take 10 mg by mouth daily    MOMETASONE-FORMOTEROL (DULERA) 200-5 MCG/ACT INHALER    Inhale 2 puffs into the lungs 2 times daily    MONTELUKAST (SINGULAIR) 10 MG TABLET    Take 10 mg by mouth nightly. NITROGLYCERIN (NITROSTAT) 0.4 MG SL TABLET    Take with onset of chest pain, up to max of 3 total doses 5 min apart. If no relief after 3 doses, call 911 or go to the Emergency Room    OMEPRAZOLE (PRILOSEC) 40 MG DELAYED RELEASE CAPSULE    Take 40 mg by mouth daily    ONDANSETRON (ZOFRAN ODT) 4 MG DISINTEGRATING TABLET    Take 1 tablet by mouth every 8 hours as needed for Nausea or Vomiting    OXYCODONE-ACETAMINOPHEN (PERCOCET) 7.5-325 MG PER TABLET    Take 1 tablet by mouth every 8 hours as needed. ALLERGIES     Patient has no known allergies. FAMILY HISTORY     No family history on file.        SOCIAL HISTORY       Social History     Socioeconomic History    Marital status:      Spouse name: Not on file    Number of children: Not on file    Years of education: Not on file    Highest education level: Not on file   Occupational History    Not on file   Tobacco Use    Smoking status: Former Smoker    Smokeless tobacco: Never Used   Vaping Use    Vaping Use: Never used   Substance and Sexual Activity    Alcohol use: No    Drug use: No    Sexual activity: Never   Other Topics Concern    Not on file   Social History Narrative    Not on file     Social Determinants of Health     Financial Resource Strain:     Difficulty of Paying Living Expenses: Not on file   Food Insecurity:     Worried About [employer] [employer] in the Last Year: Not on file    [place] [employer] in the Last Year: Not on file   Transportation Needs:     Lack of Transportation (Medical): Not on file    Lack of Transportation (Non-Medical): Not on file   Physical Activity:     Days of Exercise per Week: Not on file    Minutes of Exercise per Session: Not on file   Stress:     Feeling of Stress : Not on file   Social Connections:     Frequency of Communication with Friends and Family: Not on file    Frequency of Social Gatherings with Friends and Family: Not on file    Attends Mandaeism Services: Not on file    Active Member of [address] [place] [address] [employer] or Organizations: Not on file    Attends Club or Organization Meetings: Not on file    Marital Status: Not on file   Intimate Partner Violence:     Fear of Current or Ex-Partner: Not on file    Emotionally Abused: Not on file    Physically Abused: Not on file    Sexually Abused: Not on file   Housing Stability:     Unable to Pay for Housing in the Last Year: Not on file    Number of [place] in the Last Year: Not on file    Unstable Housing in the Last Year: Not on file         PHYSICAL EXAM    (up to 7 for level 4, 8 or more for level 5)     ED Triage Vitals [05/25/22 2047]   BP Temp Temp src Heart Rate Resp SpO2 Height Weight   -- -- -- 73 20 96 % 5' (1.524 m) 201 lb (91.2 kg)       Physical Exam  General :Patient is awake, alert, oriented, in no acute distress, nontoxic appearing  HEENT: Pupils are equally round and reactive to light, EOMI, conjunctivae clear. Oral mucosa is moist, no exudate. Uvula is midline  Neck: Neck is supple, full range of motion, trachea midline  Cardiac: Heart regular rate, rhythm, no murmurs, rubs, or gallops  Lungs: Lungs are clear to auscultation, there is no wheezing, rhonchi, or rales. There is no use of accessory muscles. Chest wall: There is no tenderness to palpation over the chest wall or over ribs  Abdomen: Abdomen is soft, diffuse abdominal pain on palpation that recreates the symptoms that brought the patient into the emergency department. Increased bowel sounds all 4 quadrants.   Musculoskeletal: 5 out of 5 strength in all 4 extremities. No focal muscle deficits are appreciated  Neuro: Motor intact, sensory intact, level of consciousness is normal, cerebellar function is normal, reflexes are grossly normal. No evidence of incontinence or loss of bowel or bladder function, no saddle anesthesia noted   Dermatology: Skin is warm and dry  Psych: Mentation is grossly normal, cognition is grossly normal. Affect is appropriate. DIAGNOSTIC RESULTS     EKG: All EKG's are interpreted by the Emergency Department Physician who either signs or Co-signs this chart in the [address] a cardiologist.    The EKG interpreted by me shows     RADIOLOGY:   Non-plain film images such as CT, Ultrasound and MRI are read by the radiologist. Plain radiographic images are visualized and preliminarily interpreted by the emergency physician with the below findings:      ? Radiologist's Report Reviewed:  CT ABDOMEN PELVIS WO CONTRAST Additional Contrast? None   Final Result      No acute findings.             ED BEDSIDE ULTRASOUND:   Performed by ED Physician - none    LABS:    I have reviewed and interpreted all of the currently available lab results from this visit (ifapplicable):  Results for orders placed or performed during the hospital encounter of 05/25/22   CBC with Auto Differential   Result Value Ref Range    WBC 15.3 (H) 4.0 - 11.0 K/uL    RBC 3.58 (L) 3.80 - 5.80 M/uL    Hemoglobin 11.2 (L) 11.5 - 16.5 g/dL    Hematocrit 33.8 (L) 37.0 - 47.0 %    MCV 94.4 80.0 - 100.0 fL    MCH 31.3 27.0 - 32.0 pg    MCHC 33.1 31.0 - 35.0 g/dL    RDW 12.4 11.0 - 16.0 %    Platelets 702 520 - 626 K/uL    MPV 10.2 (H) 6.0 - 10.0 fL    Neutrophils % 83.3 %    Immature Granulocytes % 0.4 0.0 - 5.0 %    Lymphocytes % 9.7 %    Monocytes % 5.4 %    Eosinophils % 0.9 %    Basophils % 0.3 %    Neutrophils Absolute 12.8 (H) 2.0 - 7.5 K/uL    Immature Granulocytes # 0.1 K/uL    Lymphocytes Absolute 1.5 1.5 - 4.0 K/uL    Monocytes Absolute 0.8 0.2 - 0.8 K/uL Eosinophils Absolute 0.1 0.0 - 0.4 K/uL    Basophils Absolute 0.0 0.0 - 0.1 K/uL   Comprehensive Metabolic Panel w/ Reflex to MG   Result Value Ref Range    Sodium 132 (L) 136 - 145 mmol/L    Potassium reflex Magnesium 5.6 (H) 3.4 - 5.1 mmol/L    Chloride 97 (L) 98 - 107 mmol/L    CO2 24 20 - 30 mmol/L    Anion Gap 11 3 - 16    Glucose 357 (H) 74 - 106 mg/dL    BUN 38 (H) 6 - 20 mg/dL    CREATININE 1.4 (H) 0.4 - 1.2 mg/dL    GFR Non-African American 36 (L) >59    GFR  43 (L) >59    Calcium 9.5 8.5 - 10.5 mg/dL    Total Protein 7.5 6.4 - 8.3 g/dL    Albumin 4.0 3.4 - 4.8 g/dL    Albumin/Globulin Ratio 1.1 0.8 - 2.0    Total Bilirubin 0.5 0.3 - 1.2 mg/dL    Alkaline Phosphatase 74 25 - 100 U/L    ALT 12 4 - 36 U/L    AST 20 8 - 33 U/L    Globulin 3.5 Not Established g/dL   Lipase   Result Value Ref Range    Lipase 14.0 5.6 - 51.3 U/L   Urinalysis with Reflex to Culture    Specimen: Urine   Result Value Ref Range    Color, UA Yellow Straw/Yellow    Clarity, UA SLCLOUDY Clear    Glucose, Ur >=1000 (A) Negative mg/dL    Bilirubin Urine Negative Negative    Ketones, Urine Negative Negative mg/dL    Specific Gravity, UA 1.020 1.005 - 1.030    Blood, Urine MODERATE (A) Negative    pH, UA 7.0 5.0 - 8.0    Protein,  (A) Negative mg/dL    Urobilinogen, Urine 0.2 <2.0 E.U./dL    Nitrite, Urine POSITIVE (A) Negative    Leukocyte Esterase, Urine SMALL (A) Negative    Microscopic Examination YES     Urine Type NotGiven     Urine Reflex to Culture Yes    Microscopic Urinalysis   Result Value Ref Range    WBC, UA 21-50 (A) 0 - 5 /HPF    RBC, UA 11-20 (A) 0 - 4 /HPF    Epithelial Cells, UA 2-5 0 - 5 /HPF    Bacteria, UA 2+ (A) None Seen /HPF        All other labs were within normal range or not returned as of this dictation.     EMERGENCY DEPARTMENT COURSE and DIFFERENTIAL DIAGNOSIS/MDM:   Vitals:    Vitals:    05/25/22 2047   Pulse: 73   Resp: 20   SpO2: 96%   Weight: 201 lb (91.2 kg)   Height: 5' (1.524 m) MEDICATIONS ADMINISTERED IN ED:  Medications   cefTRIAXone (ROCEPHIN) 1000 mg IVPB in 50 mL D5W minibag (1,000 mg IntraVENous New Bag 5/26/22 0011)   0.9 % sodium chloride bolus (0 mLs IntraVENous Stopped 5/25/22 2331)   morphine sulfate (PF) injection 4 mg (4 mg IntraVENous Given 5/25/22 2202)   ondansetron (ZOFRAN) injection 4 mg (4 mg IntraVENous Given 5/25/22 2202)   pantoprazole (PROTONIX) 40 mg in sodium chloride 0.9 % 50 mL bolus (0 mg IntraVENous Stopped 5/25/22 2331)       Patient was placed examination room at which time after exam was performed IV was obtained and labs are drawn. Patient was given 1 L IV normal saline bolus along with 40 mg of Protonix IV and 4 mg of Zofran IV and 4 mg of morphine IV. Patient was then taken over CT scan abdomen pelvis without contrast which revealed no acute process per radiology. Review of patient's labs reveal urinalysis that is nitrate positive UA with 21-50 WBCs and patient was given Rocephin 1 g IV. Patient also was given Pyridium 200 mg p.o. patient states that her blood sugar normally runs in the 300s and uses a sliding scale insulin at home. Patient was just started on Keflex 500 mg p.o. 4 times daily and has prescription in her purse. Patient was instructed to take medications as prescribed and follow-up with her primary physician 1 to 2 days. Patient was prescribed pain medication today. Patient was appreciative the care and agrees with plan above    The patient will follow-up with their PCP in 1-2 days for reevaluation. If the patient or family members have anyfurther concerns or any worsening symptoms they will return to the ED for reevaluation. CONSULTS:  None    PROCEDURES:  Procedures    CRITICAL CARE TIME    Total Critical Care time was 0 minutes, excluding separately reportable procedures.    There was a high probability of clinically significant/life threatening deterioration in the patient's condition which required my urgent intervention. FINAL IMPRESSION      1. Generalized abdominal pain Stable   2. Acute UTI Stable   3. Non-intractable vomiting with nausea, unspecified vomiting type Stable         DISPOSITION/PLAN   DISPOSITION Decision To Discharge 05/26/2022 12:10:11 AM      PATIENT REFERRED TO:  [facility]  [address] [facility]  [phone]  In 2 days  If symptoms worsen      Follow-up primary physician 1 to 2 days for reexamination  In 2 days  If symptoms worsen      DISCHARGE MEDICATIONS:  New Prescriptions    PHENAZOPYRIDINE (PYRIDIUM) 200 MG TABLET    Take 1 tablet by mouth 3 times daily as needed for Pain       Comment: Please note this report has been produced using speech recognition software and may contain errorsrelated to that system including errors in grammar, punctuation, and spelling, as well as words and phrases that may be inappropriate. If there are any questions or concerns please feel free to contact the dictating providerfor clarification.     Daroi Patterson MD  Attending Emergency Physician              Dario Patterson MD  05/26/22 7467

## 2022-05-26 NOTE — ED TRIAGE NOTES
Pt dx w kidney infection today; received rx for keflex 500mg, pt sts she has taken 1dose. Pt also has rx for percocet 7.5 for back pain, last dose at 1000. Pt sts pain in low back and ab is 10/10 and worse after urination.

## 2022-05-27 LAB — URINE CULTURE, ROUTINE: NORMAL

## 2022-06-22 ENCOUNTER — TELEPHONE (OUTPATIENT)
Dept: SURGERY | Facility: CLINIC | Age: 82
End: 2022-06-22

## 2022-07-26 DIAGNOSIS — I65.23 BILATERAL CAROTID ARTERY STENOSIS: Primary | ICD-10-CM

## 2022-08-19 ENCOUNTER — APPOINTMENT (OUTPATIENT)
Dept: CT IMAGING | Facility: HOSPITAL | Age: 82
End: 2022-08-19
Payer: MEDICARE

## 2022-08-19 ENCOUNTER — HOSPITAL ENCOUNTER (EMERGENCY)
Facility: HOSPITAL | Age: 82
Discharge: HOME | End: 2022-08-19
Attending: HOSPITALIST
Payer: MEDICARE

## 2022-08-19 VITALS
DIASTOLIC BLOOD PRESSURE: 56 MMHG | SYSTOLIC BLOOD PRESSURE: 135 MMHG | OXYGEN SATURATION: 98 % | TEMPERATURE: 98.2 F | HEIGHT: 60 IN | WEIGHT: 204 LBS | HEART RATE: 65 BPM | BODY MASS INDEX: 40.05 KG/M2 | RESPIRATION RATE: 18 BRPM

## 2022-08-19 DIAGNOSIS — I95.1 ORTHOSTATIC SYNCOPE: Primary | ICD-10-CM

## 2022-08-19 DIAGNOSIS — S60.221A TRAUMATIC HEMATOMA OF RIGHT HAND, INITIAL ENCOUNTER: ICD-10-CM

## 2022-08-19 DIAGNOSIS — S09.93XA FACIAL INJURY, INITIAL ENCOUNTER: ICD-10-CM

## 2022-08-19 DIAGNOSIS — S61.419A SKIN TEAR OF HAND WITHOUT COMPLICATION, INITIAL ENCOUNTER: ICD-10-CM

## 2022-08-19 LAB
A/G RATIO: 1.2 (ref 0.8–2)
ALBUMIN SERPL-MCNC: 3.6 G/DL (ref 3.4–4.8)
ALP BLD-CCNC: 46 U/L (ref 25–100)
ALT SERPL-CCNC: 24 U/L (ref 4–36)
ANION GAP SERPL CALCULATED.3IONS-SCNC: 9 MMOL/L (ref 3–16)
AST SERPL-CCNC: 36 U/L (ref 8–33)
BASOPHILS ABSOLUTE: 0 K/UL (ref 0–0.1)
BASOPHILS RELATIVE PERCENT: 0.2 %
BILIRUB SERPL-MCNC: 0.4 MG/DL (ref 0.3–1.2)
BUN BLDV-MCNC: 40 MG/DL (ref 6–20)
CALCIUM SERPL-MCNC: 9.4 MG/DL (ref 8.5–10.5)
CHLORIDE BLD-SCNC: 101 MMOL/L (ref 98–107)
CO2: 26 MMOL/L (ref 20–30)
CREAT SERPL-MCNC: 1.4 MG/DL (ref 0.4–1.2)
EOSINOPHILS ABSOLUTE: 0.1 K/UL (ref 0–0.4)
EOSINOPHILS RELATIVE PERCENT: 0.9 %
GFR AFRICAN AMERICAN: 43
GFR NON-AFRICAN AMERICAN: 36
GLOBULIN: 3.1 G/DL
GLUCOSE BLD-MCNC: 166 MG/DL (ref 74–106)
HCT VFR BLD CALC: 32.8 % (ref 37–47)
HEMOGLOBIN: 10.9 G/DL (ref 11.5–16.5)
IMMATURE GRANULOCYTES #: 0.1 K/UL
IMMATURE GRANULOCYTES %: 0.5 % (ref 0–5)
LYMPHOCYTES ABSOLUTE: 1.7 K/UL (ref 1.5–4)
LYMPHOCYTES RELATIVE PERCENT: 13.4 %
MCH RBC QN AUTO: 31.6 PG (ref 27–32)
MCHC RBC AUTO-ENTMCNC: 33.2 G/DL (ref 31–35)
MCV RBC AUTO: 95.1 FL (ref 80–100)
MONOCYTES ABSOLUTE: 0.9 K/UL (ref 0.2–0.8)
MONOCYTES RELATIVE PERCENT: 6.8 %
NEUTROPHILS ABSOLUTE: 10.2 K/UL (ref 2–7.5)
NEUTROPHILS RELATIVE PERCENT: 78.2 %
PDW BLD-RTO: 12.1 % (ref 11–16)
PLATELET # BLD: 256 K/UL (ref 150–400)
PMV BLD AUTO: 9.4 FL (ref 6–10)
POTASSIUM REFLEX MAGNESIUM: 4.8 MMOL/L (ref 3.4–5.1)
RBC # BLD: 3.45 M/UL (ref 3.8–5.8)
SODIUM BLD-SCNC: 136 MMOL/L (ref 136–145)
TOTAL PROTEIN: 6.7 G/DL (ref 6.4–8.3)
TROPONIN: <0.3 NG/ML
WBC # BLD: 13 K/UL (ref 4–11)

## 2022-08-19 PROCEDURE — 99284 EMERGENCY DEPT VISIT MOD MDM: CPT

## 2022-08-19 PROCEDURE — 85025 COMPLETE CBC W/AUTO DIFF WBC: CPT

## 2022-08-19 PROCEDURE — 80053 COMPREHEN METABOLIC PANEL: CPT

## 2022-08-19 PROCEDURE — 70486 CT MAXILLOFACIAL W/O DYE: CPT

## 2022-08-19 PROCEDURE — 93005 ELECTROCARDIOGRAM TRACING: CPT

## 2022-08-19 PROCEDURE — 84484 ASSAY OF TROPONIN QUANT: CPT

## 2022-08-19 PROCEDURE — 2580000003 HC RX 258: Performed by: HOSPITALIST

## 2022-08-19 PROCEDURE — 36415 COLL VENOUS BLD VENIPUNCTURE: CPT

## 2022-08-19 RX ORDER — 0.9 % SODIUM CHLORIDE 0.9 %
1000 INTRAVENOUS SOLUTION INTRAVENOUS ONCE
Status: COMPLETED | OUTPATIENT
Start: 2022-08-19 | End: 2022-08-19

## 2022-08-19 RX ORDER — POLYETHYLENE GLYCOL 3350 17 G/17G
17 POWDER, FOR SOLUTION ORAL DAILY PRN
Qty: 527 G | Refills: 1 | Status: SHIPPED | OUTPATIENT
Start: 2022-08-19 | End: 2022-09-18

## 2022-08-19 RX ADMIN — SODIUM CHLORIDE 1000 ML: 9 INJECTION, SOLUTION INTRAVENOUS at 14:05

## 2022-08-19 ASSESSMENT — PAIN DESCRIPTION - PAIN TYPE: TYPE: ACUTE PAIN

## 2022-08-19 ASSESSMENT — PAIN - FUNCTIONAL ASSESSMENT: PAIN_FUNCTIONAL_ASSESSMENT: WONG-BAKER FACES

## 2022-08-19 ASSESSMENT — PAIN DESCRIPTION - ORIENTATION: ORIENTATION: LEFT;RIGHT

## 2022-08-19 ASSESSMENT — PAIN DESCRIPTION - FREQUENCY: FREQUENCY: CONTINUOUS

## 2022-08-19 ASSESSMENT — PAIN SCALES - GENERAL: PAINLEVEL_OUTOF10: 3

## 2022-08-19 NOTE — ED NOTES
Patients family at bedside at this time. Patient given another blanket at this time.      Stephany Nguyen RN  08/19/22 4302

## 2022-08-19 NOTE — ED PROVIDER NOTES
[facility] ENCOUNTER      Pt Name: David Knight  MRN: [id number]  YOB: 1940  Date of evaluation: 8/19/2022  Provider: Mary Cerna [address] [place] [address]       Chief Complaint   Patient presents with    Dizziness    Fall         HISTORY OF PRESENT ILLNESS  (Location/Symptom, Timing/Onset, Context/Setting, Quality, Duration, Modifying Factors, Severity.)   David Knight is a 79 y.o. female who presents to the emergency department for syncope/near syncopal episode. Patient states that she was on the toilet straining to have a bowel movement. She thought she could possibly been constipated. She advised that her bowel movement prior to the 1 that she had today was about 2 days ago. She was able to have a small bowel movement while on the toilet but she states that she began getting lightheaded and dizzy. She states that she tried to get up and get back to her bedroom so that she could lay down on the bed due to her symptoms but when she attempted to stand she sort of fell forward and hit the side of her face against a plug-in in an outlet in the bathroom that made this mostly keep ants away so she injured her left cheek she states that she did not actually hit her head she states she had no loss of consciousness. Patient denies any neck or back pain. She states that her left cheek is bothering him and also complaining of a slight bruise to the palmar surface of her right thumb and then a small skin tear on the left thumb itself. Patient denies any chest pain or shortness of breath. Denies any sensation of palpitations. Denies any nausea or vomiting since the incident. Denies any abdominal pain. Denies any numbness tingling weakness of the extremities out of ordinary. Nursing notes were reviewed.     REVIEW OFSYSTEMS    (2-9 systems for level 4, 10 or more for level 5)   ROS:  General:  No fevers, no chills, no weakness  Cardiovascular:  No chest pain, no palpitations,  +syncope/near syncope  Respiratory:  No shortness of breath, no cough, no wheezing  Gastrointestinal:  No pain, no nausea, no vomiting, no diarrhea  Musculoskeletal:  No muscle pain, no joint pain  Skin:  No rash, no easy bruising, +bruise to right thumb, +skin tear to the left thumb, +left cheek abrasion  Neurologic:  No speech problems, no headache, no extremity weakness  Psychiatric:  No anxiety  Genitourinary:  No dysuria, no hematuria    Except as noted above the remainder of the review of systems was reviewed and negative. PAST MEDICAL HISTORY     Past Medical History:   Diagnosis Date    Arthritis     CAD (coronary artery disease)     Chronic kidney disease     ? low functioning per pt    Diabetes mellitus ([facility] [address]     Hyperlipidemia     Hypertension     Thyroid disease          SURGICAL HISTORY       Past Surgical History:   Procedure Laterality Date    CARDIAC SURGERY      3 stents in  2018    CAROTID ENDARTERECTOMY Right     CAROTID ENDARTERECTOMY Right      SECTION      CHOLECYSTECTOMY      COLONOSCOPY      HYSTERECTOMY (CERVIX STATUS UNKNOWN)           CURRENT MEDICATIONS       Previous Medications    AMLODIPINE (NORVASC) 10 MG TABLET    Take 10 mg by mouth daily    ASPIRIN 81 MG CHEWABLE TABLET    Take 81 mg by mouth daily. ATORVASTATIN (LIPITOR) 80 MG TABLET    Take 80 mg by mouth daily    CALCIUM CARB-CHOLECALCIFEROL (CALCIUM+D3 PO)    Take by mouth daily    CARVEDILOL (COREG) 25 MG TABLET    Take 25 mg by mouth 2 times daily (with meals).     CEPHALEXIN (KEFLEX) 500 MG CAPSULE    Take 500 mg by mouth 2 times daily    CLOPIDOGREL (PLAVIX) 75 MG TABLET    Take 75 mg by mouth daily    CYCLOBENZAPRINE (FLEXERIL) 10 MG TABLET    Take 10 mg by mouth 3 times daily as needed for Muscle spasms    DOCUSATE SODIUM (COLACE) 100 MG CAPSULE    Take 100 mg by mouth 2 times daily as needed for Constipation    DOXEPIN (SINEQUAN) 50 MG CAPSULE    Take 50 mg by mouth nightly    FENOFIBRATE (TRIGLIDE) 160 MG TABLET    Take 160 mg by mouth daily    FERROUS SULFATE (IRON 325) 325 (65 FE) MG TABLET    Take 325 mg by mouth daily (with breakfast)    FLUTICASONE (FLONASE) 50 MCG/ACT NASAL SPRAY    1 spray by Nasal route daily Each nostril daily    FOLIC ACID (FOLVITE) 1 MG TABLET    Take 1 mg by mouth daily    FUROSEMIDE (LASIX) 20 MG TABLET    Take 1 tablet by mouth daily as needed (worsening swelling or weight gain > 3 lbs) Take 20mg by mouth every day for 4-5 days and stop for lower extremity edema    INSULIN GLARGINE (LANTUS SC)    Inject 75 Units into the skin daily     INSULIN LISPRO (HUMALOG) 100 UNIT/ML INJECTION VIAL    Inject 5 Units into the skin 3 times daily (before meals)    LEVOTHYROXINE (SYNTHROID) 75 MCG TABLET    Take 75 mcg by mouth Daily. LORATADINE (CLARITIN) 10 MG TABLET    Take 10 mg by mouth daily    MOMETASONE-FORMOTEROL (DULERA) 200-5 MCG/ACT INHALER    Inhale 2 puffs into the lungs 2 times daily    MONTELUKAST (SINGULAIR) 10 MG TABLET    Take 10 mg by mouth nightly. NITROGLYCERIN (NITROSTAT) 0.4 MG SL TABLET    Take with onset of chest pain, up to max of 3 total doses 5 min apart. If no relief after 3 doses, call 911 or go to the Emergency Room    OMEPRAZOLE (PRILOSEC) 40 MG DELAYED RELEASE CAPSULE    Take 40 mg by mouth daily    ONDANSETRON (ZOFRAN ODT) 4 MG DISINTEGRATING TABLET    Take 1 tablet by mouth every 8 hours as needed for Nausea or Vomiting    OXYCODONE-ACETAMINOPHEN (PERCOCET) 7.5-325 MG PER TABLET    Take 1 tablet by mouth every 8 hours as needed. ALLERGIES     Patient has no known allergies. FAMILY HISTORY     History reviewed. No pertinent family history.        SOCIAL HISTORY       Social History     Socioeconomic History    Marital status:      Spouse name: None    Number of children: None    Years of education: None    Highest education level: None   Tobacco Use    Smoking status: Former Smokeless tobacco: Never   Vaping Use    Vaping Use: Never used   Substance and Sexual Activity    Alcohol use: No    Drug use: No    Sexual activity: Never         PHYSICAL EXAM    (up to 7 for level 4, 8 or more for level 5)     ED Triage Vitals [08/19/22 1241]   BP Temp Temp Source Heart Rate Resp SpO2 Height Weight   (!) 109/47 98.2 °F (36.8 °C) Oral 58 16 97 % 5' (1.524 m) 204 lb (92.5 kg)       Physical Exam  General :Patient is awake, alert, oriented, in no acute distress, nontoxic appearing  HEENT: Pupils are equally round and reactive to light, EOMI, conjunctivae clear. Oral mucosa is moist, no exudate. Uvula is midline, abrasion mild bruising noted to the left cheek area over the zygomatic arch slightly tender to touch. No tenderness to the forehead or to the jaw itself. Neck: Neck is supple, full range of motion, trachea midline, no midline bony tenderness  Cardiac: Heart regular rate, rhythm, no murmurs, rubs, or gallops  Lungs: Lungs are clear to auscultation, there is no wheezing, rhonchi, or rales. There is no use of accessory muscles. Abdomen: Abdomen is soft, nontender, nondistended. There is no firm or pulsatile masses, no rebound rigidity or guarding. Musculoskeletal: 5 out of 5 strength in all 4 extremities. No focal muscle deficits are appreciated  Neuro: Motor intact, sensory intact, level of consciousness is normal  Dermatology: Skin is warm and dry, + small hematoma noted to the base of the right thumb palmar area. There is also small skin tear noted to the distal aspect of the left thumb. Psych: Mentation is grossly normal, cognition is grossly normal. Affect is appropriate. DIAGNOSTIC RESULTS     EKG: All EKG's are interpreted by the Emergency Department Physician who either signs or Co-signs this chart in the [address] a cardiologist.    The EKG interpreted by me shows sinus rhythm. Prolonged IA interval.  Low voltage precordial leads.   Heart rate is 63 bpm, IA interval is 260, QRS duration is 96, QT is 410 and QTC is 420 ms. No acute T wave inversions concerning for acute myocardial ischemia. No ST elevations concerning for acute myocardial infarction. RADIOLOGY:   Non-plain film images such as CT, Ultrasound and MRI are read by the radiologist. Plain radiographic images are visualized and preliminarily interpreted by the emergency physician with the below findings:      [] Radiologist's Report Reviewed:  CT FACIAL BONES WO CONTRAST   Final Result      1. No acute fracture of the visualized maxillofacial bones. 2. Rightward deviation of nasal septum.                   ED BEDSIDE ULTRASOUND:   Performed by ED Physician - none    LABS:    I have reviewed and interpreted all of the currently available lab results from this visit (ifapplicable):  Results for orders placed or performed during the hospital encounter of 08/19/22   CBC with Auto Differential   Result Value Ref Range    WBC 13.0 (H) 4.0 - 11.0 K/uL    RBC 3.45 (L) 3.80 - 5.80 M/uL    Hemoglobin 10.9 (L) 11.5 - 16.5 g/dL    Hematocrit 32.8 (L) 37.0 - 47.0 %    MCV 95.1 80.0 - 100.0 fL    MCH 31.6 27.0 - 32.0 pg    MCHC 33.2 31.0 - 35.0 g/dL    RDW 12.1 11.0 - 16.0 %    Platelets 588 751 - 406 K/uL    MPV 9.4 6.0 - 10.0 fL    Neutrophils % 78.2 %    Immature Granulocytes % 0.5 0.0 - 5.0 %    Lymphocytes % 13.4 %    Monocytes % 6.8 %    Eosinophils % 0.9 %    Basophils % 0.2 %    Neutrophils Absolute 10.2 (H) 2.0 - 7.5 K/uL    Immature Granulocytes # 0.1 K/uL    Lymphocytes Absolute 1.7 1.5 - 4.0 K/uL    Monocytes Absolute 0.9 (H) 0.2 - 0.8 K/uL    Eosinophils Absolute 0.1 0.0 - 0.4 K/uL    Basophils Absolute 0.0 0.0 - 0.1 K/uL   Comprehensive Metabolic Panel w/ Reflex to MG   Result Value Ref Range    Sodium 136 136 - 145 mmol/L    Potassium reflex Magnesium 4.8 3.4 - 5.1 mmol/L    Chloride 101 98 - 107 mmol/L    CO2 26 20 - 30 mmol/L    Anion Gap 9 3 - 16    Glucose 166 (H) 74 - 106 mg/dL    BUN 40 (H) 6 - 20 mg/dL Creatinine 1.4 (H) 0.4 - 1.2 mg/dL    GFR Non-African American 36 (L) >59    GFR  43 (L) >59    Calcium 9.4 8.5 - 10.5 mg/dL    Total Protein 6.7 6.4 - 8.3 g/dL    Albumin 3.6 3.4 - 4.8 g/dL    Albumin/Globulin Ratio 1.2 0.8 - 2.0    Total Bilirubin 0.4 0.3 - 1.2 mg/dL    Alkaline Phosphatase 46 25 - 100 U/L    ALT 24 4 - 36 U/L    AST 36 (H) 8 - 33 U/L    Globulin 3.1 Not Established g/dL   Troponin   Result Value Ref Range    Troponin <0.30 <0.30 ng/mL        All other labs were within normal range or not returned as of this dictation. EMERGENCY DEPARTMENT COURSE and DIFFERENTIAL DIAGNOSIS/MDM:   Vitals:    Vitals:    08/19/22 1325 08/19/22 1345 08/19/22 1413 08/19/22 1415   BP: (!) 108/51 (!) 123/52 (!) 124/52 (!) 128/50   Pulse:   61 65   Resp:   18 18   Temp:       TempSrc:       SpO2: 96% 97% 98% 98%   Weight:       Height:           MEDICATIONS ADMINISTERED IN ED:  Medications   0.9 % sodium chloride bolus (1,000 mLs IntraVENous New Bag 8/19/22 1405)       After initial evaluation examination I did have conversation with the patient about the upcoming plan, treatment and possible disposition which they are agreeable to times dictation. Advised we give her fluid bolus normal saline 1 L. Patient have CT scan of the facial bones performed to rule out fracture of the zygomatic area where her injury was sustained on the left side. Patient have CBC, CMP, troponin and a twelve-lead EKG performed. Patient's final disposition will determine once her radiological diagnostic studies been performed reviewed. However on physical exam she did not have any midline bony tenderness to her neck or back. Denied any loss of consciousness she states that she not actually hit her head she hit all on her face is more of a side of a glancing hit. Patient is resting company stretcher no acute distress nontoxic-appearing this time.   Also discussed with her that we may add MiraLAX to her regimen to see if this helps with her sensation of constipation. She states that she does take something for home for constipation but is just pill or tablet that she uses. Blood work showed white count 13,000, hemoglobin is 10.9, hematocrit 32.8, platelet counts 934. Comprehensive metabolic panel was benign except for glucose of 166 which is elevated, BUN of 40, creatinine 1.4. AST was mildly elevated at 36. Troponin was nondetectable less than 0.30. Patient's previous blood work back on 5/25/2022 showed a creatinine 1.4 and a BUN of 38 previously to that she was 1.4 and 30 so he is around her baseline. CT scan of the facial bones without contrast read by radiology as no acute fracture of the visualized maxillofacial bones. Rightward deviation of the nasal septum. Patient's radiological diagnostic studies were discussed with her she does state her understanding. Patient family advised there is no acute fracture. Her work-up and evaluation here was essentially benign. Patient vasovagal and on the toilet while straining to have bowel movement. We will add MiraLAX to her regimen to help with her constipation that she states that she has. From her med list it looks like the only medication that she is taking for constipation is Colace. And that is twice a day. The patient advised that she does need to follow-up with a regular family physician within the next 1 to 2 days for evaluation. She was also given instructions of the symptoms worsens or new symptoms arise she should return back to emergency department for further evaluation work-up. Is this patient to be included in the SEP-1 Core Measure due to severe sepsis or septic shock? No   Exclusion criteria - the patient is NOT to be included for SEP-1 Core Measure due to: Infection is not suspected          CONSULTS:  None    PROCEDURES:  Procedures    CRITICAL CARE TIME    Total Critical Care time was 0 minutes, excluding separately reportable procedures. There was a high probability of clinically significant/life threatening deterioration in the patient's condition which required my urgent intervention. FINAL IMPRESSION      1. Orthostatic syncope    2. Facial injury, initial encounter    3. Skin tear of hand without complication, initial encounter    4. Traumatic hematoma of right hand, initial encounter          DISPOSITION/PLAN   DISPOSITION Decision To Discharge 08/19/2022 02:51:18 PM      PATIENT REFERRED TO:  Your PCP  1-2 days for re-evaluation        [facility] Emergency Department  [facility] [address] [facility]  [phone]    As needed, If symptoms worsen    DISCHARGE MEDICATIONS:  New Prescriptions    POLYETHYLENE GLYCOL (MIRALAX) 17 G PACKET    Take 17 g by mouth daily as needed for Other (Constipation)       Comment: Please note this report has been produced using speech recognition software and may contain errorsrelated to that system including errors in grammar, punctuation, and spelling, as well as words and phrases that may be inappropriate. If there are any questions or concerns please feel free to contact the dictating providerfor clarification.     Zana Arteaga DO  Attending Emergency Physician               Zana Arteaga DO  08/19/22 0480

## 2022-08-19 NOTE — ED NOTES
Patients left hand skin tear cleaned at this time with chlorhexidene and normal saline, patients skin tear dried thoroughly, steri strips applied and then covered with a large bandaid. Patient tolerated well.      Josue Dewitt RN  08/19/22 7465

## 2022-12-21 ENCOUNTER — HOSPITAL ENCOUNTER (EMERGENCY)
Facility: HOSPITAL | Age: 82
Discharge: HOME | End: 2022-12-21
Attending: HOSPITALIST
Payer: MEDICARE

## 2022-12-21 VITALS
HEART RATE: 69 BPM | SYSTOLIC BLOOD PRESSURE: 128 MMHG | RESPIRATION RATE: 22 BRPM | BODY MASS INDEX: 32.14 KG/M2 | OXYGEN SATURATION: 96 % | WEIGHT: 200 LBS | TEMPERATURE: 98.5 F | HEIGHT: 66 IN | DIASTOLIC BLOOD PRESSURE: 54 MMHG

## 2022-12-21 DIAGNOSIS — K56.41 FECAL IMPACTION (HCC): ICD-10-CM

## 2022-12-21 DIAGNOSIS — K60.2 RECTAL FISSURE: ICD-10-CM

## 2022-12-21 DIAGNOSIS — K59.03 DRUG-INDUCED CONSTIPATION: Primary | ICD-10-CM

## 2022-12-21 PROCEDURE — 99283 EMERGENCY DEPT VISIT LOW MDM: CPT

## 2022-12-21 RX ORDER — POLYETHYLENE GLYCOL 3350 17 G/17G
17 POWDER, FOR SOLUTION ORAL DAILY PRN
Qty: 527 G | Refills: 1 | Status: SHIPPED | OUTPATIENT
Start: 2022-12-21 | End: 2023-01-20

## 2022-12-21 RX ORDER — DOCUSATE SODIUM 100 MG/1
100 CAPSULE, LIQUID FILLED ORAL 2 TIMES DAILY
Qty: 60 CAPSULE | Refills: 0 | Status: SHIPPED | OUTPATIENT
Start: 2022-12-21 | End: 2023-01-20

## 2022-12-21 ASSESSMENT — PAIN SCALES - GENERAL: PAINLEVEL_OUTOF10: 0

## 2022-12-21 NOTE — ED NOTES
Patient was unable to have BM, scant amount of liquid noted. Dr Ferdinand Thurman at bedside, digital removal of feces done at this time. Extra large stool removed. Patient states her stomach feels much better afterwards. There was small amount of bright red blood noted in stool, Dr Ferdinand Thurman informed patient that she may see blood for a few days. Patient verbalized understanding.      Holland Bacon RN  12/21/22 5160

## 2022-12-21 NOTE — ED NOTES
AVS and Rx reviewed with patient, understanding verbalized. Patient encouraged to follow up with pcp as needed or return to ED as needed. Patient has no further needs or concerns voiced. Patient is currently waiting for her family to pick her up.      Alex Randolph RN  12/21/22 1000

## 2022-12-21 NOTE — ED NOTES
Patient arrives with EMS and is requesting to go to the bathroom. Patient assisted to bedside commode with assistance.      Adela Ruiz RN  12/21/22 9604

## 2022-12-21 NOTE — ED PROVIDER NOTES
[address] ENCOUNTER      Pt Name: Lee Ann Palma  MRN: [id number]  YOB: 1940  Date of evaluation: 12/21/2022  Provider: Randolph Flores DO    CHIEF COMPLAINT       Chief Complaint   Patient presents with    Abdominal Pain    Constipation         HISTORY OF PRESENT ILLNESS  (Location/Symptom, Timing/Onset, Context/Setting, Quality, Duration, Modifying Factors, Severity.)   Lee Ann Palma is a 79 y.o. female who presents to the emergency department abdominal pain and constipation. Patient states that abdominal pain that she is having is actually chronic is no different than what she normally has however she does have a lot of rectal pain and discomfort. She states that she feels like she needs to have a bowel movement and just cannot. She states that she has a history of constipation in the past.  She takes daily pain medication at night. She states that she has not had any nausea or vomiting with the symptoms. She has chronic dizziness from going from a sitting to a standing position but she states that has not had any change or difference with the symptoms that she presented today. Patient came in by EMS for her abdominal discomfort and the sensation that she was constipated and needed to have a bowel movement but could not. She states that this started yesterday evening. Denies any body aches out of ordinary. Denies any fevers or chills. States that she still able to pass some gas but she just cannot have a bowel movement. States that she has been constipated like this before in the past.      Nursing notes were reviewed.     REVIEW OFSYSTEMS    (2-9 systems for level 4, 10 or more for level 5)   ROS:  General:  No fevers, no chills, no weakness  Cardiovascular:  No chest pain, no palpitations  Respiratory:  No shortness of breath, no cough, no wheezing  Gastrointestinal:  + pain-chronic, no nausea, no vomiting, no diarrhea, +constipation  Musculoskeletal:  No muscle pain, no joint pain  Skin:  No rash, no easy bruising  Neurologic:  No speech problems, no headache, no extremity weakness  Psychiatric:  No anxiety  Genitourinary:  No dysuria, no hematuria    Except as noted above the remainder of the review of systems was reviewed and negative. PAST MEDICAL HISTORY     Past Medical History:   Diagnosis Date    Arthritis     CAD (coronary artery disease)     Chronic kidney disease     ? low functioning per pt    Diabetes mellitus ([address]     Hyperlipidemia     Hypertension     Thyroid disease          SURGICAL HISTORY       Past Surgical History:   Procedure Laterality Date    CARDIAC SURGERY      3 stents in  2018    CAROTID ENDARTERECTOMY Right     CAROTID ENDARTERECTOMY Right      SECTION      CHOLECYSTECTOMY      COLONOSCOPY      HYSTERECTOMY (CERVIX STATUS UNKNOWN)           CURRENT MEDICATIONS       Previous Medications    AMLODIPINE (NORVASC) 10 MG TABLET    Take 10 mg by mouth daily    ASPIRIN 81 MG CHEWABLE TABLET    Take 81 mg by mouth daily. ATORVASTATIN (LIPITOR) 80 MG TABLET    Take 80 mg by mouth daily    CALCIUM CARB-CHOLECALCIFEROL (CALCIUM+D3 PO)    Take by mouth daily    CARVEDILOL (COREG) 25 MG TABLET    Take 25 mg by mouth 2 times daily (with meals).     CEPHALEXIN (KEFLEX) 500 MG CAPSULE    Take 500 mg by mouth 2 times daily    CLOPIDOGREL (PLAVIX) 75 MG TABLET    Take 75 mg by mouth daily    CYCLOBENZAPRINE (FLEXERIL) 10 MG TABLET    Take 10 mg by mouth 3 times daily as needed for Muscle spasms    DOXEPIN (SINEQUAN) 50 MG CAPSULE    Take 50 mg by mouth nightly    FENOFIBRATE (TRIGLIDE) 160 MG TABLET    Take 160 mg by mouth daily    FERROUS SULFATE (IRON 325) 325 (65 FE) MG TABLET    Take 325 mg by mouth daily (with breakfast)    FLUTICASONE (FLONASE) 50 MCG/ACT NASAL SPRAY    1 spray by Nasal route daily Each nostril daily    FOLIC ACID (FOLVITE) 1 MG TABLET    Take 1 mg by mouth daily FUROSEMIDE (LASIX) 20 MG TABLET    Take 1 tablet by mouth daily as needed (worsening swelling or weight gain > 3 lbs) Take 20mg by mouth every day for 4-5 days and stop for lower extremity edema    INSULIN GLARGINE (LANTUS SC)    Inject 75 Units into the skin daily     INSULIN LISPRO (HUMALOG) 100 UNIT/ML INJECTION VIAL    Inject 5 Units into the skin 3 times daily (before meals)    LEVOTHYROXINE (SYNTHROID) 75 MCG TABLET    Take 75 mcg by mouth Daily. LORATADINE (CLARITIN) 10 MG TABLET    Take 10 mg by mouth daily    MOMETASONE-FORMOTEROL (DULERA) 200-5 MCG/ACT INHALER    Inhale 2 puffs into the lungs 2 times daily    MONTELUKAST (SINGULAIR) 10 MG TABLET    Take 10 mg by mouth nightly. NITROGLYCERIN (NITROSTAT) 0.4 MG SL TABLET    Take with onset of chest pain, up to max of 3 total doses 5 min apart. If no relief after 3 doses, call 911 or go to the Emergency Room    OMEPRAZOLE (PRILOSEC) 40 MG DELAYED RELEASE CAPSULE    Take 40 mg by mouth daily    ONDANSETRON (ZOFRAN ODT) 4 MG DISINTEGRATING TABLET    Take 1 tablet by mouth every 8 hours as needed for Nausea or Vomiting    OXYCODONE-ACETAMINOPHEN (PERCOCET) 7.5-325 MG PER TABLET    Take 1 tablet by mouth every 8 hours as needed. ALLERGIES     Patient has no known allergies. FAMILY HISTORY     History reviewed. No pertinent family history.        SOCIAL HISTORY       Social History     Socioeconomic History    Marital status:      Spouse name: None    Number of children: None    Years of education: None    Highest education level: None   Tobacco Use    Smoking status: Former    Smokeless tobacco: Never   Vaping Use    Vaping Use: Never used   Substance and Sexual Activity    Alcohol use: No    Drug use: No    Sexual activity: Never         PHYSICAL EXAM    (up to 7 for level 4, 8 or more for level 5)     ED Triage Vitals   BP Temp Temp src Pulse Resp SpO2 Height Weight   -- -- -- -- -- -- -- --       Physical Exam  General :Patient is awake, alert, oriented, in no acute distress, nontoxic appearing  HEENT: Pupils are equally round and reactive to light, EOMI, conjunctivae clear. Oral mucosa is moist, no exudate. Uvula is midline  Cardiac: Heart regular rate, rhythm, no murmurs, rubs, or gallops  Lungs: Lungs are clear to auscultation, there is no wheezing, rhonchi, or rales. There is no use of accessory muscles. Abdomen: Abdomen is soft, very mild palpable tenderness to the right lower and left lower quadrant with deep palpation. , nondistended. There is no firm or pulsatile masses, no rebound rigidity or guarding. Positive bowel sounds all 4 quadrants but mildly decreased. Musculoskeletal: No focal muscle deficits are appreciated  Neuro: Motor intact, sensory intact, level of consciousness is normal  Dermatology: Skin is warm and dry  Psych: Mentation is grossly normal, cognition is grossly normal. Affect is appropriate. RECTAL:  No external masses, erythema, or bleeding. There is no tenderness to palpation. Rectal tone is mild. Stool is brown . Was some bleeding with the digital evacuation of the rectal vault from a small rectal fissure/tear from the size of the stool. Patient had large amount of hard pebbly natividad stool that was evacuated from the vault. The rectal vault is now empty on reexamination.       DIAGNOSTIC RESULTS     EKG: All EKG's are interpreted by the Emergency Department Physician who either signs or Co-signs this chart in the [address] a cardiologist.        RADIOLOGY:   Non-plain film images such as CT, Ultrasound and MRI are read by the radiologist. Plain radiographic images are visualized and preliminarily interpreted by the emergency physician with the below findings:      [] Radiologist's Report Reviewed:  No orders to display         ED BEDSIDE ULTRASOUND:   Performed by ED Physician - none    LABS:    I have reviewed and interpreted all of the currently available lab results from this visit (ifapplicable):  No results found for this visit on 12/21/22. All other labs were within normal range or not returned as of this dictation. EMERGENCY DEPARTMENT COURSE and DIFFERENTIAL DIAGNOSIS/MDM:   Vitals:    Vitals:    12/21/22 0928   BP: (!) 158/54   Pulse: 69   Resp: 22   Temp: 98.5 °F (36.9 °C)   TempSrc: Oral   SpO2: 98%   Weight: 200 lb (90.7 kg)   Height: 5' 6\" (1.676 m)       MEDICATIONS ADMINISTERED IN ED:  Medications - No data to display    After initial evaluation examination I did have a conversation with the patient about upcoming plan, treatment possible disposition which they are agreeable to the times dictation. Patient advised that since she is on daily pain medication at home she should be on probably daily stool softener. She does have a prescription for Colace but she states she does not take it at home when she is not sure that if she even has any left because the last time her meds were updated here was back in May. Patient advised that I would write her for Colace twice a day for a month supply and their family doctor would need to carry that over. We will also place her on MiraLAX daily 1 glass. Patient symptoms completely resolved after fecal evacuation of her rectal vault. She was significantly fecal impacted. She did try to have a bowel movement upon arrival here and the only thing that she was able to produce was a small amount of liquid stool. After the digital manipulation she did have a small rectal tear/fissure to the left upper area around the 11 o'clock position on a clock face. I did discuss with the patient that she will probably have bleeding with bowel movements for the next couple of days with this it is normal that she probably should use sitz bath's to help with this and try not to wipe as much because it only irritated make it worse. She does state her understanding of this. Otherwise patient stable to be discharged home at this time.       The patient advised that she does need to follow-up with her regular family physician within the next 1 to 2 days for reevaluation. She was also given instruction of her symptoms worsens or new symptoms arise she should return back to emergency department for further evaluation work-up. Is this patient to be included in the SEP-1 Core Measure due to severe sepsis or septic shock? No   Exclusion criteria - the patient is NOT to be included for SEP-1 Core Measure due to: Infection is not suspected          CONSULTS:  None    PROCEDURES:  Procedures    CRITICAL CARE TIME    Total Critical Care time was 0 minutes, excluding separately reportable procedures. There was a high probability of clinically significant/life threatening deterioration in the patient's condition which required my urgent intervention. FINAL IMPRESSION      1. Drug-induced constipation    2. Fecal impaction ([facility] [address]    3. Rectal fissure          DISPOSITION/PLAN   DISPOSITION Decision To Discharge 12/21/2022 09:38:26 AM      PATIENT REFERRED TO:  Your PCP  1-2 daysf for reevaluation        [facility] Emergency Department  [facility] [facility] [address] [facility]  [phone]    As needed, If symptoms worsen    DISCHARGE MEDICATIONS:  New Prescriptions    POLYETHYLENE GLYCOL (MIRALAX) 17 G PACKET    Take 17 g by mouth daily as needed for Other (Constipation)       Comment: Please note this report has been produced using speech recognition software and may contain errorsrelated to that system including errors in grammar, punctuation, and spelling, as well as words and phrases that may be inappropriate. If there are any questions or concerns please feel free to contact the dictating providerfor clarification.     Kya Hodgson DO  Attending Emergency Physician               Kya Hodgson DO  12/21/22 1146

## 2022-12-21 NOTE — DISCHARGE INSTRUCTIONS
Please use sitz bath's for the next couple of days to help with healing of the rectal fissure. Patient will have bleeding noted when she does have a bowel movement over the next several days and probably bright red blood initially and then will turn sort of darker. Patient can add Epson salts to the tub water but after every bowel movement she would need to sit in tub water comfortable temperature for 20 minutes and clean herself this way to help from keep wiping because it will only irritate the rectal fissure caused from the fecal impaction and the removal of the large stool that she had.

## 2022-12-21 NOTE — ED NOTES
Patient updated that family has arrived. She is encouraged to follow up with pcp again and to  her meds at pharmacy and take as directed. Patient assisted to pov. With patient's permission, AVS reviewed with family.      Oksana Perez RN  12/21/22 3097

## 2022-12-21 NOTE — ED NOTES
Family has arrived to ED and updated that patient is up for discharge. They are asked to bring the car around and staff will bring her to the car.      Erick Guillory RN  12/21/22 1460

## 2023-01-23 ENCOUNTER — APPOINTMENT (OUTPATIENT)
Dept: GENERAL RADIOLOGY | Facility: HOSPITAL | Age: 83
DRG: 690 | End: 2023-01-23
Payer: MEDICARE

## 2023-01-23 ENCOUNTER — HOSPITAL ENCOUNTER (INPATIENT)
Facility: HOSPITAL | Age: 83
LOS: 1 days | Discharge: HOME | DRG: 690 | End: 2023-01-26
Attending: HOSPITALIST | Admitting: INTERNAL MEDICINE
Payer: MEDICARE

## 2023-01-23 ENCOUNTER — APPOINTMENT (OUTPATIENT)
Dept: CT IMAGING | Facility: HOSPITAL | Age: 83
DRG: 690 | End: 2023-01-23
Payer: MEDICARE

## 2023-01-23 DIAGNOSIS — R41.0 CONFUSION: ICD-10-CM

## 2023-01-23 DIAGNOSIS — N30.00 ACUTE CYSTITIS WITHOUT HEMATURIA: Primary | ICD-10-CM

## 2023-01-23 PROBLEM — G93.40 ACUTE ENCEPHALOPATHY: Status: ACTIVE | Noted: 2023-01-23

## 2023-01-23 LAB
A/G RATIO: 1.2 (ref 0.8–2)
ALBUMIN SERPL-MCNC: 4 G/DL (ref 3.4–4.8)
ALP BLD-CCNC: 60 U/L (ref 25–100)
ALT SERPL-CCNC: 14 U/L (ref 4–36)
AMPHETAMINE SCREEN, URINE: ABNORMAL
ANION GAP SERPL CALCULATED.3IONS-SCNC: 14 MMOL/L (ref 3–16)
AST SERPL-CCNC: 16 U/L (ref 8–33)
BACTERIA: ABNORMAL /HPF
BARBITURATE SCREEN URINE: ABNORMAL
BASOPHILS ABSOLUTE: 0 K/UL (ref 0–0.1)
BASOPHILS RELATIVE PERCENT: 0.4 %
BENZODIAZEPINE SCREEN, URINE: ABNORMAL
BILIRUB SERPL-MCNC: 0.4 MG/DL (ref 0.3–1.2)
BILIRUBIN URINE: ABNORMAL
BLOOD, URINE: ABNORMAL
BUN BLDV-MCNC: 39 MG/DL (ref 6–20)
BUPRENORPHINE QUAL, URINE: ABNORMAL
CALCIUM SERPL-MCNC: 9.9 MG/DL (ref 8.5–10.5)
CANNABINOID SCREEN URINE: ABNORMAL
CHLORIDE BLD-SCNC: 96 MMOL/L (ref 98–107)
CLARITY: ABNORMAL
CO2: 24 MMOL/L (ref 20–30)
COCAINE METABOLITE SCREEN URINE: ABNORMAL
COLOR: YELLOW
CREAT SERPL-MCNC: 1.2 MG/DL (ref 0.4–1.2)
EOSINOPHILS ABSOLUTE: 0.1 K/UL (ref 0–0.4)
EOSINOPHILS RELATIVE PERCENT: 0.6 %
GFR SERPL CREATININE-BSD FRML MDRD: 45 ML/MIN/{1.73_M2}
GLOBULIN: 3.3 G/DL
GLUCOSE BLD-MCNC: 243 MG/DL (ref 74–106)
GLUCOSE BLD-MCNC: 299 MG/DL (ref 74–106)
GLUCOSE BLD-MCNC: 354 MG/DL (ref 74–106)
GLUCOSE BLD-MCNC: 379 MG/DL (ref 74–106)
GLUCOSE URINE: 500 MG/DL
HBA1C MFR BLD: 9.1 %
HCT VFR BLD CALC: 38.1 % (ref 37–47)
HEMOGLOBIN: 12.6 G/DL (ref 11.5–16.5)
IMMATURE GRANULOCYTES #: 0 K/UL
IMMATURE GRANULOCYTES %: 0.2 % (ref 0–5)
KETONES, URINE: ABNORMAL MG/DL
LACTIC ACID, SEPSIS: 1.8 MMOL/L (ref 0.4–1.9)
LEUKOCYTE ESTERASE, URINE: ABNORMAL
LYMPHOCYTES ABSOLUTE: 2.7 K/UL (ref 1.5–4)
LYMPHOCYTES RELATIVE PERCENT: 27.1 %
Lab: ABNORMAL
MCH RBC QN AUTO: 31 PG (ref 27–32)
MCHC RBC AUTO-ENTMCNC: 33.1 G/DL (ref 31–35)
MCV RBC AUTO: 93.6 FL (ref 80–100)
METHADONE SCREEN, URINE: ABNORMAL
METHAMPHETAMINE, URINE: ABNORMAL
MICROSCOPIC EXAMINATION: YES
MONOCYTES ABSOLUTE: 0.8 K/UL (ref 0.2–0.8)
MONOCYTES RELATIVE PERCENT: 7.7 %
NEUTROPHILS ABSOLUTE: 6.4 K/UL (ref 2–7.5)
NEUTROPHILS RELATIVE PERCENT: 64 %
NITRITE, URINE: POSITIVE
OPIATE SCREEN URINE: ABNORMAL
PDW BLD-RTO: 11.9 % (ref 11–16)
PERFORMED ON: ABNORMAL
PH UA: 5.5 (ref 5–8)
PHENCYCLIDINE SCREEN URINE: ABNORMAL
PLATELET # BLD: 289 K/UL (ref 150–400)
PMV BLD AUTO: 10.1 FL (ref 6–10)
POTASSIUM REFLEX MAGNESIUM: 4.4 MMOL/L (ref 3.4–5.1)
PROPOXYPHENE SCREEN, URINE: ABNORMAL
PROTEIN UA: >=300 MG/DL
RAPID INFLUENZA  B AGN: NEGATIVE
RAPID INFLUENZA A AGN: NEGATIVE
RBC # BLD: 4.07 M/UL (ref 3.8–5.8)
RBC UA: ABNORMAL /HPF (ref 0–4)
SARS-COV-2, NAAT: NOT DETECTED
SODIUM BLD-SCNC: 134 MMOL/L (ref 136–145)
SPECIFIC GRAVITY UA: 1.02 (ref 1–1.03)
TOTAL PROTEIN: 7.3 G/DL (ref 6.4–8.3)
TRICYCLIC, URINE: POSITIVE
TROPONIN: <0.3 NG/ML
UR OXYCODONE RAPID SCREEN: ABNORMAL
URINE REFLEX TO CULTURE: YES
URINE TYPE: ABNORMAL
UROBILINOGEN, URINE: 0.2 E.U./DL
WBC # BLD: 10 K/UL (ref 4–11)
WBC UA: ABNORMAL /HPF (ref 0–5)

## 2023-01-23 PROCEDURE — 87804 INFLUENZA ASSAY W/OPTIC: CPT

## 2023-01-23 PROCEDURE — 84484 ASSAY OF TROPONIN QUANT: CPT

## 2023-01-23 PROCEDURE — 87040 BLOOD CULTURE FOR BACTERIA: CPT

## 2023-01-23 PROCEDURE — 51701 INSERT BLADDER CATHETER: CPT

## 2023-01-23 PROCEDURE — 87186 SC STD MICRODIL/AGAR DIL: CPT

## 2023-01-23 PROCEDURE — 6360000002 HC RX W HCPCS: Performed by: HOSPITALIST

## 2023-01-23 PROCEDURE — 2580000003 HC RX 258: Performed by: HOSPITALIST

## 2023-01-23 PROCEDURE — 83605 ASSAY OF LACTIC ACID: CPT

## 2023-01-23 PROCEDURE — 81001 URINALYSIS AUTO W/SCOPE: CPT

## 2023-01-23 PROCEDURE — 99285 EMERGENCY DEPT VISIT HI MDM: CPT

## 2023-01-23 PROCEDURE — 87635 SARS-COV-2 COVID-19 AMP PRB: CPT

## 2023-01-23 PROCEDURE — 6360000002 HC RX W HCPCS: Performed by: PHYSICIAN ASSISTANT

## 2023-01-23 PROCEDURE — 80053 COMPREHEN METABOLIC PANEL: CPT

## 2023-01-23 PROCEDURE — 87077 CULTURE AEROBIC IDENTIFY: CPT

## 2023-01-23 PROCEDURE — 96365 THER/PROPH/DIAG IV INF INIT: CPT

## 2023-01-23 PROCEDURE — 36415 COLL VENOUS BLD VENIPUNCTURE: CPT

## 2023-01-23 PROCEDURE — 96372 THER/PROPH/DIAG INJ SC/IM: CPT

## 2023-01-23 PROCEDURE — 96361 HYDRATE IV INFUSION ADD-ON: CPT

## 2023-01-23 PROCEDURE — P9612 CATHETERIZE FOR URINE SPEC: HCPCS

## 2023-01-23 PROCEDURE — 80307 DRUG TEST PRSMV CHEM ANLYZR: CPT

## 2023-01-23 PROCEDURE — 85025 COMPLETE CBC W/AUTO DIFF WBC: CPT

## 2023-01-23 PROCEDURE — 87086 URINE CULTURE/COLONY COUNT: CPT

## 2023-01-23 PROCEDURE — 6370000000 HC RX 637 (ALT 250 FOR IP): Performed by: PHYSICIAN ASSISTANT

## 2023-01-23 PROCEDURE — G0378 HOSPITAL OBSERVATION PER HR: HCPCS

## 2023-01-23 PROCEDURE — 96374 THER/PROPH/DIAG INJ IV PUSH: CPT

## 2023-01-23 PROCEDURE — 71045 X-RAY EXAM CHEST 1 VIEW: CPT

## 2023-01-23 PROCEDURE — 83036 HEMOGLOBIN GLYCOSYLATED A1C: CPT

## 2023-01-23 PROCEDURE — 70450 CT HEAD/BRAIN W/O DYE: CPT

## 2023-01-23 PROCEDURE — 93005 ELECTROCARDIOGRAM TRACING: CPT

## 2023-01-23 RX ORDER — LEVOTHYROXINE SODIUM 0.07 MG/1
75 TABLET ORAL DAILY
Status: DISCONTINUED | OUTPATIENT
Start: 2023-01-23 | End: 2023-01-26 | Stop reason: HOSPADM

## 2023-01-23 RX ORDER — UBIDECARENONE 75 MG
50 CAPSULE ORAL DAILY
COMMUNITY

## 2023-01-23 RX ORDER — ACETAMINOPHEN 650 MG/1
650 SUPPOSITORY RECTAL EVERY 6 HOURS PRN
Status: DISCONTINUED | OUTPATIENT
Start: 2023-01-23 | End: 2023-01-26 | Stop reason: HOSPADM

## 2023-01-23 RX ORDER — 0.9 % SODIUM CHLORIDE 0.9 %
500 INTRAVENOUS SOLUTION INTRAVENOUS ONCE
Status: COMPLETED | OUTPATIENT
Start: 2023-01-23 | End: 2023-01-23

## 2023-01-23 RX ORDER — FERROUS SULFATE 325(65) MG
325 TABLET ORAL
Status: DISCONTINUED | OUTPATIENT
Start: 2023-01-24 | End: 2023-01-24 | Stop reason: CLARIF

## 2023-01-23 RX ORDER — ONDANSETRON 2 MG/ML
4 INJECTION INTRAMUSCULAR; INTRAVENOUS EVERY 6 HOURS PRN
Status: DISCONTINUED | OUTPATIENT
Start: 2023-01-23 | End: 2023-01-26 | Stop reason: HOSPADM

## 2023-01-23 RX ORDER — ONDANSETRON 4 MG/1
4 TABLET, ORALLY DISINTEGRATING ORAL EVERY 8 HOURS PRN
Status: DISCONTINUED | OUTPATIENT
Start: 2023-01-23 | End: 2023-01-26 | Stop reason: HOSPADM

## 2023-01-23 RX ORDER — FUROSEMIDE 20 MG/1
20 TABLET ORAL DAILY PRN
Status: DISCONTINUED | OUTPATIENT
Start: 2023-01-23 | End: 2023-01-26 | Stop reason: HOSPADM

## 2023-01-23 RX ORDER — UBIDECARENONE 75 MG
50 CAPSULE ORAL DAILY
Status: DISCONTINUED | OUTPATIENT
Start: 2023-01-23 | End: 2023-01-26 | Stop reason: HOSPADM

## 2023-01-23 RX ORDER — MONTELUKAST SODIUM 10 MG/1
10 TABLET ORAL NIGHTLY
Status: DISCONTINUED | OUTPATIENT
Start: 2023-01-23 | End: 2023-01-26 | Stop reason: HOSPADM

## 2023-01-23 RX ORDER — INSULIN LISPRO 100 [IU]/ML
0-8 INJECTION, SOLUTION INTRAVENOUS; SUBCUTANEOUS
Status: DISCONTINUED | OUTPATIENT
Start: 2023-01-23 | End: 2023-01-26 | Stop reason: HOSPADM

## 2023-01-23 RX ORDER — DOXEPIN HYDROCHLORIDE 25 MG/1
100 CAPSULE ORAL NIGHTLY
Status: DISCONTINUED | OUTPATIENT
Start: 2023-01-23 | End: 2023-01-26 | Stop reason: HOSPADM

## 2023-01-23 RX ORDER — CLOPIDOGREL BISULFATE 75 MG/1
75 TABLET ORAL DAILY
Status: DISCONTINUED | OUTPATIENT
Start: 2023-01-23 | End: 2023-01-26 | Stop reason: HOSPADM

## 2023-01-23 RX ORDER — INSULIN LISPRO 100 [IU]/ML
0-4 INJECTION, SOLUTION INTRAVENOUS; SUBCUTANEOUS NIGHTLY
Status: DISCONTINUED | OUTPATIENT
Start: 2023-01-23 | End: 2023-01-26 | Stop reason: HOSPADM

## 2023-01-23 RX ORDER — ATORVASTATIN CALCIUM 80 MG/1
80 TABLET, FILM COATED ORAL DAILY
Status: DISCONTINUED | OUTPATIENT
Start: 2023-01-23 | End: 2023-01-26 | Stop reason: HOSPADM

## 2023-01-23 RX ORDER — ASPIRIN 81 MG/1
81 TABLET, CHEWABLE ORAL DAILY
Status: DISCONTINUED | OUTPATIENT
Start: 2023-01-23 | End: 2023-01-26 | Stop reason: HOSPADM

## 2023-01-23 RX ORDER — AMITRIPTYLINE HYDROCHLORIDE 10 MG/1
10 TABLET, FILM COATED ORAL NIGHTLY
Status: DISCONTINUED | OUTPATIENT
Start: 2023-01-23 | End: 2023-01-26 | Stop reason: HOSPADM

## 2023-01-23 RX ORDER — PANTOPRAZOLE SODIUM 40 MG/1
40 TABLET, DELAYED RELEASE ORAL
Status: DISCONTINUED | OUTPATIENT
Start: 2023-01-24 | End: 2023-01-26 | Stop reason: HOSPADM

## 2023-01-23 RX ORDER — OXYCODONE AND ACETAMINOPHEN 7.5; 325 MG/1; MG/1
1 TABLET ORAL EVERY 8 HOURS PRN
Status: DISCONTINUED | OUTPATIENT
Start: 2023-01-23 | End: 2023-01-26 | Stop reason: HOSPADM

## 2023-01-23 RX ORDER — INSULIN GLARGINE 100 [IU]/ML
75 INJECTION, SOLUTION SUBCUTANEOUS DAILY
Status: DISCONTINUED | OUTPATIENT
Start: 2023-01-23 | End: 2023-01-26 | Stop reason: HOSPADM

## 2023-01-23 RX ORDER — FOLIC ACID 1 MG/1
1 TABLET ORAL DAILY
Status: DISCONTINUED | OUTPATIENT
Start: 2023-01-23 | End: 2023-01-26 | Stop reason: HOSPADM

## 2023-01-23 RX ORDER — POLYETHYLENE GLYCOL 3350 17 G/17G
17 POWDER, FOR SOLUTION ORAL DAILY PRN
Status: DISCONTINUED | OUTPATIENT
Start: 2023-01-23 | End: 2023-01-26 | Stop reason: HOSPADM

## 2023-01-23 RX ORDER — CETIRIZINE HYDROCHLORIDE 10 MG/1
10 TABLET ORAL DAILY
Status: DISCONTINUED | OUTPATIENT
Start: 2023-01-23 | End: 2023-01-26 | Stop reason: HOSPADM

## 2023-01-23 RX ORDER — ENOXAPARIN SODIUM 100 MG/ML
40 INJECTION SUBCUTANEOUS DAILY
Status: DISCONTINUED | OUTPATIENT
Start: 2023-01-23 | End: 2023-01-26 | Stop reason: HOSPADM

## 2023-01-23 RX ORDER — AMITRIPTYLINE HYDROCHLORIDE 10 MG/1
10 TABLET, FILM COATED ORAL NIGHTLY
COMMUNITY

## 2023-01-23 RX ORDER — ACETAMINOPHEN 325 MG/1
650 TABLET ORAL EVERY 6 HOURS PRN
Status: DISCONTINUED | OUTPATIENT
Start: 2023-01-23 | End: 2023-01-26 | Stop reason: HOSPADM

## 2023-01-23 RX ORDER — CARVEDILOL 25 MG/1
25 TABLET ORAL 2 TIMES DAILY WITH MEALS
Status: DISCONTINUED | OUTPATIENT
Start: 2023-01-23 | End: 2023-01-26 | Stop reason: HOSPADM

## 2023-01-23 RX ADMIN — CEFTRIAXONE 1000 MG: 1 INJECTION, POWDER, FOR SOLUTION INTRAMUSCULAR; INTRAVENOUS at 14:08

## 2023-01-23 RX ADMIN — OXYCODONE HYDROCHLORIDE AND ACETAMINOPHEN 1 TABLET: 7.5; 325 TABLET ORAL at 18:25

## 2023-01-23 RX ADMIN — AMITRIPTYLINE HYDROCHLORIDE 10 MG: 10 TABLET, FILM COATED ORAL at 20:13

## 2023-01-23 RX ADMIN — ENOXAPARIN SODIUM 40 MG: 100 INJECTION SUBCUTANEOUS at 18:18

## 2023-01-23 RX ADMIN — DOXEPIN HYDROCHLORIDE 100 MG: 25 CAPSULE ORAL at 20:12

## 2023-01-23 RX ADMIN — CARVEDILOL 25 MG: 25 TABLET, FILM COATED ORAL at 18:20

## 2023-01-23 RX ADMIN — INSULIN LISPRO 4 UNITS: 100 INJECTION, SOLUTION INTRAVENOUS; SUBCUTANEOUS at 17:58

## 2023-01-23 RX ADMIN — MONTELUKAST SODIUM 10 MG: 10 TABLET, COATED ORAL at 20:13

## 2023-01-23 RX ADMIN — INSULIN GLARGINE 75 UNITS: 100 INJECTION, SOLUTION SUBCUTANEOUS at 20:21

## 2023-01-23 RX ADMIN — SODIUM CHLORIDE 500 ML: 9 INJECTION, SOLUTION INTRAVENOUS at 12:08

## 2023-01-23 ASSESSMENT — PAIN - FUNCTIONAL ASSESSMENT: PAIN_FUNCTIONAL_ASSESSMENT: NONE - DENIES PAIN

## 2023-01-23 ASSESSMENT — LIFESTYLE VARIABLES: HOW OFTEN DO YOU HAVE A DRINK CONTAINING ALCOHOL: NEVER

## 2023-01-23 NOTE — ED PROVIDER NOTES
[address] ENCOUNTER      Pt Name: Ji Aviles  MRN: [id number]  YOB: 1940  Date of evaluation: 1/23/2023  Provider: Taco Monsalve, [facility]       Chief Complaint   Patient presents with    Altered Mental Status         HISTORY OF PRESENT ILLNESS  (Location/Symptom, Timing/Onset, Context/Setting, Quality, Duration, Modifying Factors, Severity.)   Ji Aviles is a 79 y.o. female who presents to the emergency department altered mental status. EMS was not able to provide much information because they state the call came in from a third-party. The patient's daughter actually called EMS for them to come evaluate the patient for altered mental status but she was there with an elderly gentleman that they assumed was her  really did not provide any information. They do not know how long the patient has been altered just that the patient's daughter had called for them to come and evaluate her. They transported her here to the emergency department for evaluation. They assumed that she most likely has urinary tract infection based on the \"odor\". Upon arrival here the patient is alert awake she is answering questions but she is slightly confused she does know she is at [facility].  She thought today was Sunday when it actually was Monday. She was unable to provide the month or the year she continued to give a day of the week which she kept repeating Saturday. Patient states that she does get a little confused about things. But she was able to answer her questions. She denies any headaches out of ordinary. She denies any earache or sore throat. Denies any chest pain or shortness of breath but she states she has had a mild cough over the last several days early she thinks. Patient denies any nausea vomiting or diarrhea.   She denies any abdominal pain at this time but states she has had some abdominal discomfort in her left lower quadrant but she is not having this now. Patient was actually evaluated here by me on 12/21/2023 and she had fecal impaction/constipation and rectal fissure at that time from drug-induced constipation. She is on oral pain medication and iron tablets which could both contribute to this. She denies any numbness tingling weakness or extremities. Denies any fevers or chills. She states that she has had some burning with urination. She does admit to strong odor to her urine. She states it has been darker than normal also. Denies any sick contacts that she is aware of. Patient states that she is not sure if she has been vaccinated for COVID or for influenza. Patient states as far she knows she is eating and drinking without any difficulty. She does have a history of coronary artery disease and chronic kidney disease which she does follow with a nephrologist for. She also has a history of diabetes mellitus. She has hyperlipidemia, hypertension and thyroid disease. Patient has had 3 stents placed for coronary artery disease per past medical history. Nursing notes were reviewed. REVIEW OFSYSTEMS    (2-9 systems for level 4, 10 or more for level 5)   ROS:  General:  No fevers, no chills, no weakness  Cardiovascular:  No chest pain, no palpitations  Respiratory:  No shortness of breath, no cough, no wheezing  Gastrointestinal:  No pain, no nausea, no vomiting, no diarrhea  Musculoskeletal:  No muscle pain, no joint pain  Skin:  No rash, no easy bruising  Neurologic:  No speech problems, no headache, no extremity weakness, +AMS  Psychiatric:  No anxiety  Genitourinary:  +dysuria, no hematuria, + dark-colored urine with \"strong\" smell    Except as noted above the remainder of the review of systems was reviewed and negative.        PAST MEDICAL HISTORY     Past Medical History:   Diagnosis Date    Arthritis     CAD (coronary artery disease)     Chronic kidney disease     ? low functioning per pt    Diabetes mellitus ([facility] [address]     Hyperlipidemia     Hypertension     Thyroid disease          SURGICAL HISTORY       Past Surgical History:   Procedure Laterality Date    CARDIAC SURGERY      3 stents in  2018    CAROTID ENDARTERECTOMY Right     CAROTID ENDARTERECTOMY Right      SECTION      CHOLECYSTECTOMY      COLONOSCOPY      HYSTERECTOMY (CERVIX STATUS UNKNOWN)           CURRENT MEDICATIONS       Previous Medications    AMLODIPINE (NORVASC) 10 MG TABLET    Take 10 mg by mouth daily    ASPIRIN 81 MG CHEWABLE TABLET    Take 81 mg by mouth daily. ATORVASTATIN (LIPITOR) 80 MG TABLET    Take 80 mg by mouth daily    CALCIUM CARB-CHOLECALCIFEROL (CALCIUM+D3 PO)    Take by mouth daily    CARVEDILOL (COREG) 25 MG TABLET    Take 25 mg by mouth 2 times daily (with meals). CEPHALEXIN (KEFLEX) 500 MG CAPSULE    Take 500 mg by mouth 2 times daily    CLOPIDOGREL (PLAVIX) 75 MG TABLET    Take 75 mg by mouth daily    CYCLOBENZAPRINE (FLEXERIL) 10 MG TABLET    Take 10 mg by mouth 3 times daily as needed for Muscle spasms    DOXEPIN (SINEQUAN) 50 MG CAPSULE    Take 50 mg by mouth nightly    FENOFIBRATE (TRIGLIDE) 160 MG TABLET    Take 160 mg by mouth daily    FERROUS SULFATE (IRON 325) 325 (65 FE) MG TABLET    Take 325 mg by mouth daily (with breakfast)    FLUTICASONE (FLONASE) 50 MCG/ACT NASAL SPRAY    1 spray by Nasal route daily Each nostril daily    FOLIC ACID (FOLVITE) 1 MG TABLET    Take 1 mg by mouth daily    FUROSEMIDE (LASIX) 20 MG TABLET    Take 1 tablet by mouth daily as needed (worsening swelling or weight gain > 3 lbs) Take 20mg by mouth every day for 4-5 days and stop for lower extremity edema    INSULIN GLARGINE (LANTUS SC)    Inject 75 Units into the skin daily     INSULIN LISPRO (HUMALOG) 100 UNIT/ML INJECTION VIAL    Inject 5 Units into the skin 3 times daily (before meals)    LEVOTHYROXINE (SYNTHROID) 75 MCG TABLET    Take 75 mcg by mouth Daily.     LORATADINE (CLARITIN) 10 MG TABLET    Take 10 mg by mouth daily    MOMETASONE-FORMOTEROL (DULERA) 200-5 MCG/ACT INHALER    Inhale 2 puffs into the lungs 2 times daily    MONTELUKAST (SINGULAIR) 10 MG TABLET    Take 10 mg by mouth nightly. NITROGLYCERIN (NITROSTAT) 0.4 MG SL TABLET    Take with onset of chest pain, up to max of 3 total doses 5 min apart. If no relief after 3 doses, call 911 or go to the Emergency Room    OMEPRAZOLE (PRILOSEC) 40 MG DELAYED RELEASE CAPSULE    Take 40 mg by mouth daily    ONDANSETRON (ZOFRAN ODT) 4 MG DISINTEGRATING TABLET    Take 1 tablet by mouth every 8 hours as needed for Nausea or Vomiting    OXYCODONE-ACETAMINOPHEN (PERCOCET) 7.5-325 MG PER TABLET    Take 1 tablet by mouth every 8 hours as needed. ALLERGIES     Patient has no known allergies. FAMILY HISTORY     No family history on file. SOCIAL HISTORY       Social History     Socioeconomic History    Marital status:    Tobacco Use    Smoking status: Former    Smokeless tobacco: Never   Vaping Use    Vaping Use: Never used   Substance and Sexual Activity    Alcohol use: No    Drug use: No    Sexual activity: Never         PHYSICAL EXAM    (up to 7 for level 4, 8 or more for level 5)     ED Triage Vitals   BP Temp Temp src Pulse Resp SpO2 Height Weight   -- -- -- -- -- -- -- --       Physical Exam  General :Patient is awake, alert, oriented x2 which does seem to be slightly off from when I evaluated her approximately 1 month ago, in no acute distress, nontoxic appearing  HEENT: Pupils are equally round and reactive to light, EOMI  Cardiac: Heart regular rate, rhythm, no murmurs, rubs, or gallops  Lungs: Lungs are clear to auscultation, there is no wheezing, rhonchi, or rales. There is no use of accessory muscles. Abdomen: Abdomen is soft, nontender, nondistended. There is no firm or pulsatile masses, no rebound rigidity or guarding. Musculoskeletal: No focal muscle deficits are appreciated  Neuro:  Motor intact, sensory intact, level of consciousness is normal she is slightly confused with her answering. Overweight oriented x2 which does seem to be slightly decreased from when she was here 1 month ago from prior evaluation  Dermatology: Skin is warm and dry  Psych: Mentation is grossly normal, cognition is grossly normal. Affect is appropriate. DIAGNOSTIC RESULTS     EKG: All EKG's are interpreted by the Emergency Department Physician who either signs or Co-signs this chart in the [address] a cardiologist.    The EKG interpreted by me shows sinus rhythm. Prolonged AL interval.  Heart rate is 89 bpm, AL interval is 234 ms, QRS durations 88, QT is 386 and QTC is 471 ms. No acute ST elevations concerning for acute microinfarction. No ST depressions concerning for acute myocardial ischemia. RADIOLOGY:   Non-plain film images such as CT, Ultrasound and MRI are read by the radiologist. Plain radiographic images are visualized and preliminarily interpreted by the emergency physician with the below findings:      [] Radiologist's Report Reviewed:  XR CHEST PORTABLE   Final Result      No acute cardiopulmonary disease. CT Head WO Contrast   Final Result      No acute abnormality.             ED BEDSIDE ULTRASOUND:   Performed by ED Physician - none    LABS:    I have reviewed and interpreted all of the currently available lab results from this visit (ifapplicable):  Results for orders placed or performed during the hospital encounter of 01/23/23   COVID-19, Rapid    Specimen: Nasopharyngeal Swab   Result Value Ref Range    SARS-CoV-2, NAAT Not Detected Not Detected   CBC with Auto Differential   Result Value Ref Range    WBC 10.0 4.0 - 11.0 K/uL    RBC 4.07 3.80 - 5.80 M/uL    Hemoglobin 12.6 11.5 - 16.5 g/dL    Hematocrit 38.1 37.0 - 47.0 %    MCV 93.6 80.0 - 100.0 fL    MCH 31.0 27.0 - 32.0 pg    MCHC 33.1 31.0 - 35.0 g/dL    RDW 11.9 11.0 - 16.0 %    Platelets 575 039 - 868 K/uL    MPV 10.1 (H) 6.0 - 10.0 fL    Neutrophils % 64.0 %    Immature Granulocytes % 0.2 0.0 - 5.0 %    Lymphocytes % 27.1 %    Monocytes % 7.7 %    Eosinophils % 0.6 %    Basophils % 0.4 %    Neutrophils Absolute 6.4 2.0 - 7.5 K/uL    Immature Granulocytes # 0.0 K/uL    Lymphocytes Absolute 2.7 1.5 - 4.0 K/uL    Monocytes Absolute 0.8 0.2 - 0.8 K/uL    Eosinophils Absolute 0.1 0.0 - 0.4 K/uL    Basophils Absolute 0.0 0.0 - 0.1 K/uL   Comprehensive Metabolic Panel w/ Reflex to MG   Result Value Ref Range    Sodium 134 (L) 136 - 145 mmol/L    Potassium reflex Magnesium 4.4 3.4 - 5.1 mmol/L    Chloride 96 (L) 98 - 107 mmol/L    CO2 24 20 - 30 mmol/L    Anion Gap 14 3 - 16    Glucose 379 (H) 74 - 106 mg/dL    BUN 39 (H) 6 - 20 mg/dL    Creatinine 1.2 0.4 - 1.2 mg/dL    Est, Glom Filt Rate 45 (L) >59    Calcium 9.9 8.5 - 10.5 mg/dL    Total Protein 7.3 6.4 - 8.3 g/dL    Albumin 4.0 3.4 - 4.8 g/dL    Albumin/Globulin Ratio 1.2 0.8 - 2.0    Total Bilirubin 0.4 0.3 - 1.2 mg/dL    Alkaline Phosphatase 60 25 - 100 U/L    ALT 14 4 - 36 U/L    AST 16 8 - 33 U/L    Globulin 3.3 Not Established g/dL   Troponin   Result Value Ref Range    Troponin <0.30 <0.30 ng/mL   Urinalysis with Reflex to Culture    Specimen: Urine, straight catheter   Result Value Ref Range    Color, UA Yellow Straw/Yellow    Clarity, UA CLOUDY (A) Clear    Glucose, Ur 500 (A) Negative mg/dL    Bilirubin Urine SMALL (A) Negative    Ketones, Urine TRACE (A) Negative mg/dL    Specific Gravity, UA 1.025 1.005 - 1.030    Blood, Urine SMALL (A) Negative    pH, UA 5.5 5.0 - 8.0    Protein, UA >=300 (A) Negative mg/dL    Urobilinogen, Urine 0.2 <2.0 E.U./dL    Nitrite, Urine POSITIVE (A) Negative    Leukocyte Esterase, Urine SMALL (A) Negative    Microscopic Examination YES     Urine Type NotGiven     Urine Reflex to Culture Yes    Drug Screen, Multiple, Urine   Result Value Ref Range    PCP Screen, Urine Neg Negative <25 ng/mL    Benzodiazepine Screen, Urine Neg Negative <300 ng/mL    Cocaine Metabolite Screen, Urine Neg Negative <300 ng/mL    Amphetamine Screen, Urine Neg Negative <1000 ng/mL    Cannabinoid Scrn, Ur Neg Negative <50 ng/mL    Opiate Scrn, Ur Neg Negative <300 ng/mL    Barbiturate Screen, Ur Neg Negative <682 ng/mL    Tricyclic POSITIVE (A) Negative <300 ng/mL    Methadone Screen, Urine Neg Negative <300 ng/ml    Propoxyphene Screen, Urine Neg Negative <300 ng/mL    Methamphetamine, Urine Neg Negative <1000 ng/mL    UR Oxycodone Rapid Screen Neg Negative <100 ng/mL    Buprenorphine Qual, Urine Neg Negative <25 ng/mL    Drug Screen Comment: see below    Lactate, Sepsis   Result Value Ref Range    Lactic Acid, Sepsis 1.8 0.4 - 1.9 mmol/L   Microscopic Urinalysis   Result Value Ref Range    WBC, UA  (A) 0 - 5 /HPF    RBC, UA 5-10 (A) 0 - 4 /HPF    Bacteria, UA 4+ (A) None Seen /HPF   POCT Glucose   Result Value Ref Range    POC Glucose 354 (H) 74 - 106 mg/dl    Performed on ACCU-CHEK         All other labs were within normal range or not returned as of this dictation. EMERGENCY DEPARTMENT COURSE and DIFFERENTIAL DIAGNOSIS/MDM:   Vitals:    Vitals:    01/23/23 1149 01/23/23 1152   BP:  (!) 182/81   Pulse:  93   Resp:  22   Temp:  98.1 °F (36.7 °C)   TempSrc:  Oral   Weight: 200 lb (90.7 kg)    Height: 5' 6\" (1.676 m)        MEDICATIONS ADMINISTERED IN ED:  Medications   cefTRIAXone (ROCEPHIN) 1,000 mg in dextrose 5 % 50 mL IVPB mini-bag (1,000 mg IntraVENous New Bag 1/23/23 1408)   0.9 % sodium chloride bolus (0 mLs IntraVENous Stopped 1/23/23 1309)       After initial evaluation examination I did have conversation with the patient about upcoming plan, treatment and possible disposition which she was agreeable to the times dictation. Patient advised that we give her a little fluid bolus normal saline 500 mL. We will perform CT scan of the head to make sure there is no acute intracranial process for her altered mental status but I do believe is probably can to be more urological in nature.   Patient will also have portable chest radiograph performed to make sure there is no pneumonic type process is contributing to her altered mental status. She will have CBC, CMP, troponin, UA and urine drug screen. Patient will have In-N-Out cath performed for the urine sample. Patient will also have blood cultures x2, lactic acid sepsis protocol and a rapid COVID screen performed. She will also have twelve-lead EKG performed. Otherwise she is resting comfortably in stretcher no acute distress nontoxic-appearing at this time. Again she does take chronic pain medication at home which is Percocet 7.5/325. She is also on Plavix and aspirin. She is not on any other antiplatelet or anticoagulation/blood thinning medications. She does manage her diabetes with insulin. Patient's final disposition will determine once her radiological diagnostic studies been performed reviewed. However I do suspect she will require hospitalization for her altered mental status and she is slightly confused. Patient also had a fingerstick blood sugar performed because of her diabetic status and her altered mental status. Rapid COVID screen was negative    Blood work showed white count 10,000, hemoglobin 12.6, hematocrit 38.1, platelet counts 609. Comprehensive metabolic panel was benign except for sodium 134 slightly low, chloride of 96 also slightly low, glucose of 379 which is elevated. BUN of 39 and a creatinine of 1.2. Troponin was nondetectable less than 0.30. Lactic acid is normal at 1.8.    UA showed cloudy clarity, small bilirubin, trace ketones, small blood. Positive for nitrites and also small leukoesterase was positive. Microscopy showed  white cells, 5-10 red cells and +4 bacteria. Urine drug screen positive for tricyclic's. Portable chest radiograph read by radiology as no acute cardiopulmonary disease. CT scan of the head without contrast read by radiology as no acute abnormality.     Patient's radiological diagnostic studies were discussed with her she does state her understanding. However I am not sure how much of our conversation she retained or understood technically because of her slightly altered mental status but she does shake her head understanding and answers appropriately. Advised that she does have urinary tract infection. We will begin started on Rocephin 1 g IV here. Patient is agreeable to stay in the hospital for treatment of her urinary tract infection. Case to be discussed with on-call hospitalist.    Case discussed with Chito and she is agreeable to accept the patient to the hospital for admission for urinary tract infection and confusion. Is this patient to be included in the SEP-1 Core Measure due to severe sepsis or septic shock? No   Exclusion criteria - the patient is NOT to be included for SEP-1 Core Measure due to:  2+ SIRS criteria are not met          CONSULTS:  None    PROCEDURES:  Procedures    CRITICAL CARE TIME    Total Critical Care time was 0 minutes, excluding separately reportable procedures. There was a high probability of clinically significant/life threatening deterioration in the patient's condition which required my urgent intervention. FINAL IMPRESSION      1. Acute cystitis without hematuria    2. Confusion          DISPOSITION/PLAN   DISPOSITION Decision To Admit 01/23/2023 02:12:55 PM      PATIENT REFERRED TO:  No follow-up provider specified. DISCHARGE MEDICATIONS:  New Prescriptions    No medications on file       Comment: Please note this report has been produced using speech recognition software and may contain errorsrelated to that system including errors in grammar, punctuation, and spelling, as well as words and phrases that may be inappropriate. If there are any questions or concerns please feel free to contact the dictating providerfor clarification.     Will Concepcion DO  Attending Emergency Physician               Will Concepcion DO  01/23/23 8906

## 2023-01-23 NOTE — PROGRESS NOTES
Medication Reconciliation completed with fill history from [address] [place] [address] and I spoke to her daughter who was bedside. The daughter didn't seem totally sure what all was being given to the patient or if she was receiving everything she was supposed to be.   -marked 'not taking' Fluticasone 50 mcg NS  -marked 'not taking' Dulera 200/5 mcg  -marked 'not taking' Ondansetron 4 mg  -marked 'not taking' Cephalexin 500 mg bid  -marked 'not taking' Calcium + D  -marked 'not taking' Cyclobenzaprine 10 mg tid  -changed Doxepin 50 mg qhs  mg qhs  -added Amitriptyline 10 mg qhs  -added Vit B-12 100 mcg  -removed a note from 2019 in the Oxycodone-apap . The note said 1/2 tablet but the daughter was able to confirm the patient too a whole tablet each time. Of note: the Humalog I changed to 20 units before each meal tid (it was 5 units). The last fill for this medication was 6/2/2022 from [facility] or a 25 day supply. I did mention this to her daughter who was under the impression she was still getting it and wondered why the pharmacy hadn't put it in with her other meds. I asked if the doctor had stopped it but she was unable to clarify.

## 2023-01-23 NOTE — FLOWSHEET NOTE
01/23/23 1800   Assessment   Charting Type Admission   Psychosocial   Psychosocial (WDL) WDL   Neurological   Neuro (WDL) X   Level of Consciousness 1   Orientation Level Oriented to person;Disoriented to place; Disoriented to time;Disoriented to situation   Cognition Poor safety awareness;Poor attention/concentration;Poor judgement   Speech Clear   [place] Coma Scale   Eye Opening 4   Best Verbal Response 4   Best Motor Response 6   [place] Coma Scale Score 14   HEENT (Head, Ears, Eyes, Nose, & Throat)   HEENT (WDL) X   Right Eye Blurred   Left Eye Blurred   Teeth Edentulous  (dentures at home)   Respiratory   Respiratory (WDL) WDL   Cardiac   Cardiac (WDL) WDL   Gastrointestinal   Abdominal (WDL) X   Abdomen Inspection Rounded; Soft   Last BM (including prior to admit) 01/23/23   RUQ Bowel Sounds Active   LUQ Bowel Sounds Active   RLQ Bowel Sounds Active   LLQ Bowel Sounds Active   Tenderness Soft   Genitourinary   Genitourinary (WDL) X  (UTI)   Urine Frequency   Urine Frequency TONY   Urine Urgency   Urine Urgency TONY   Urine Assessment   Urinary Status Voiding   Urine Color Yellow/straw   Peripheral Vascular   Peripheral Vascular (WDL) WDL   Skin Integumentary    Skin Integumentary (WDL) X   Skin Color Pale   Care Plan - Skin/Tissue Integrity Goals   Skin Integrity Remains Intact Monitor for areas of redness and/or skin breakdown   Musculoskeletal   Musculoskeletal (WDL) X   RUE Weakness   LUE Weakness   RL Extremity Weakness   LL Extremity Weakness   79year old female admitted to room 2-2 with AMS and UTI. Oriented to person only. Blood sugar was 299 prior to dinner. Tolerated dinner. Has PIV to left AC. Daughters at bedside. Oriented to room, unit and plan of care.

## 2023-01-23 NOTE — ED NOTES
Pt has worked her way to the foot of the bed, sitting on foot of bed, no distress noted. Fall risk sock on. Pt assisted x 2, ambulating up to [facility] and return to bed. Pt required assist x2.daughter at bedside both aware of wait for bed availability.       Keiry Kuhn RN  01/23/23 1792

## 2023-01-23 NOTE — ED TRIAGE NOTES
Pt arrived via [facility]. With complaints of AMS. Reports family said pt was confused. Suspected to have UTI. Pt was recently in hospital for UTI. Pt is oriented to self, and placed. No distress ntoed.

## 2023-01-23 NOTE — ED NOTES
Called to request bed, room 13 is only room available at this time, which is furthest from nursing station and since pt is confused and occasionally climbing out of bed, and high fall risk. Room closer to nurses station will be open shortly, and will have pt go to that room.       Keiry Kuhn RN  01/23/23 6103

## 2023-01-23 NOTE — ED NOTES
Patient off unit via ER stretcher to medical unit bed 2-2. Registration notified at this time. Patient's family ambulatory to unit with patient. Patient placed in med surg bed, weighed, and positioned per ER staff at this time. Bed alarm set to zone 2.       Patricia Paez RN  01/23/23 6632

## 2023-01-24 PROBLEM — N39.0 UTI (URINARY TRACT INFECTION): Status: ACTIVE | Noted: 2023-01-24

## 2023-01-24 LAB
A/G RATIO: 1.2 (ref 0.8–2)
ALBUMIN SERPL-MCNC: 3.6 G/DL (ref 3.4–4.8)
ALP BLD-CCNC: 51 U/L (ref 25–100)
ALT SERPL-CCNC: 14 U/L (ref 4–36)
ANION GAP SERPL CALCULATED.3IONS-SCNC: 9 MMOL/L (ref 3–16)
AST SERPL-CCNC: 17 U/L (ref 8–33)
BILIRUB SERPL-MCNC: 0.3 MG/DL (ref 0.3–1.2)
BUN BLDV-MCNC: 37 MG/DL (ref 6–20)
CALCIUM SERPL-MCNC: 9.6 MG/DL (ref 8.5–10.5)
CHLORIDE BLD-SCNC: 102 MMOL/L (ref 98–107)
CO2: 28 MMOL/L (ref 20–30)
CREAT SERPL-MCNC: 1.1 MG/DL (ref 0.4–1.2)
GFR SERPL CREATININE-BSD FRML MDRD: 50 ML/MIN/{1.73_M2}
GLOBULIN: 2.9 G/DL
GLUCOSE BLD-MCNC: 156 MG/DL (ref 74–106)
GLUCOSE BLD-MCNC: 167 MG/DL (ref 74–106)
GLUCOSE BLD-MCNC: 215 MG/DL (ref 74–106)
GLUCOSE BLD-MCNC: 240 MG/DL (ref 74–106)
GLUCOSE BLD-MCNC: 243 MG/DL (ref 74–106)
HCT VFR BLD CALC: 34.3 % (ref 37–47)
HEMOGLOBIN: 11.4 G/DL (ref 11.5–16.5)
MCH RBC QN AUTO: 31.4 PG (ref 27–32)
MCHC RBC AUTO-ENTMCNC: 33.2 G/DL (ref 31–35)
MCV RBC AUTO: 94.5 FL (ref 80–100)
PDW BLD-RTO: 12 % (ref 11–16)
PERFORMED ON: ABNORMAL
PLATELET # BLD: 253 K/UL (ref 150–400)
PMV BLD AUTO: 10.1 FL (ref 6–10)
POTASSIUM REFLEX MAGNESIUM: 3.8 MMOL/L (ref 3.4–5.1)
RBC # BLD: 3.63 M/UL (ref 3.8–5.8)
SODIUM BLD-SCNC: 139 MMOL/L (ref 136–145)
TOTAL PROTEIN: 6.5 G/DL (ref 6.4–8.3)
WBC # BLD: 8.4 K/UL (ref 4–11)

## 2023-01-24 PROCEDURE — G0378 HOSPITAL OBSERVATION PER HR: HCPCS

## 2023-01-24 PROCEDURE — 97161 PT EVAL LOW COMPLEX 20 MIN: CPT

## 2023-01-24 PROCEDURE — 6370000000 HC RX 637 (ALT 250 FOR IP): Performed by: PHYSICIAN ASSISTANT

## 2023-01-24 PROCEDURE — 96372 THER/PROPH/DIAG INJ SC/IM: CPT

## 2023-01-24 PROCEDURE — 99222 1ST HOSP IP/OBS MODERATE 55: CPT | Performed by: INTERNAL MEDICINE

## 2023-01-24 PROCEDURE — 2580000003 HC RX 258: Performed by: PHYSICIAN ASSISTANT

## 2023-01-24 PROCEDURE — 97165 OT EVAL LOW COMPLEX 30 MIN: CPT

## 2023-01-24 PROCEDURE — 80053 COMPREHEN METABOLIC PANEL: CPT

## 2023-01-24 PROCEDURE — 96366 THER/PROPH/DIAG IV INF ADDON: CPT

## 2023-01-24 PROCEDURE — 85027 COMPLETE CBC AUTOMATED: CPT

## 2023-01-24 PROCEDURE — 6360000002 HC RX W HCPCS: Performed by: PHYSICIAN ASSISTANT

## 2023-01-24 PROCEDURE — 36415 COLL VENOUS BLD VENIPUNCTURE: CPT

## 2023-01-24 PROCEDURE — 97535 SELF CARE MNGMENT TRAINING: CPT

## 2023-01-24 RX ORDER — FERROUS SULFATE TAB EC 324 MG (65 MG FE EQUIVALENT) 324 (65 FE) MG
324 TABLET DELAYED RESPONSE ORAL
Status: DISCONTINUED | OUTPATIENT
Start: 2023-01-24 | End: 2023-01-26 | Stop reason: HOSPADM

## 2023-01-24 RX ADMIN — ENOXAPARIN SODIUM 40 MG: 100 INJECTION SUBCUTANEOUS at 08:54

## 2023-01-24 RX ADMIN — VITAM B12 50 MCG: 100 TAB at 08:54

## 2023-01-24 RX ADMIN — PANTOPRAZOLE SODIUM 40 MG: 40 TABLET, DELAYED RELEASE ORAL at 05:38

## 2023-01-24 RX ADMIN — CETIRIZINE HYDROCHLORIDE 10 MG: 10 TABLET, FILM COATED ORAL at 08:54

## 2023-01-24 RX ADMIN — MONTELUKAST SODIUM 10 MG: 10 TABLET, COATED ORAL at 21:43

## 2023-01-24 RX ADMIN — CLOPIDOGREL BISULFATE 75 MG: 75 TABLET, FILM COATED ORAL at 08:55

## 2023-01-24 RX ADMIN — FERROUS SULFATE TAB EC 324 MG (65 MG FE EQUIVALENT) 324 MG: 324 (65 FE) TABLET DELAYED RESPONSE at 09:34

## 2023-01-24 RX ADMIN — LEVOTHYROXINE SODIUM 75 MCG: 75 TABLET ORAL at 05:38

## 2023-01-24 RX ADMIN — CARVEDILOL 25 MG: 25 TABLET, FILM COATED ORAL at 17:41

## 2023-01-24 RX ADMIN — CARVEDILOL 25 MG: 25 TABLET, FILM COATED ORAL at 08:54

## 2023-01-24 RX ADMIN — DOXEPIN HYDROCHLORIDE 100 MG: 25 CAPSULE ORAL at 21:43

## 2023-01-24 RX ADMIN — CEFTRIAXONE 1000 MG: 1 INJECTION, POWDER, FOR SOLUTION INTRAMUSCULAR; INTRAVENOUS at 14:28

## 2023-01-24 RX ADMIN — ASPIRIN 81 MG 81 MG: 81 TABLET ORAL at 08:55

## 2023-01-24 RX ADMIN — FOLIC ACID 1 MG: 1 TABLET ORAL at 09:05

## 2023-01-24 RX ADMIN — INSULIN LISPRO 2 UNITS: 100 INJECTION, SOLUTION INTRAVENOUS; SUBCUTANEOUS at 17:42

## 2023-01-24 RX ADMIN — AMITRIPTYLINE HYDROCHLORIDE 10 MG: 10 TABLET, FILM COATED ORAL at 21:43

## 2023-01-24 RX ADMIN — INSULIN LISPRO 2 UNITS: 100 INJECTION, SOLUTION INTRAVENOUS; SUBCUTANEOUS at 11:36

## 2023-01-24 RX ADMIN — INSULIN GLARGINE 75 UNITS: 100 INJECTION, SOLUTION SUBCUTANEOUS at 09:07

## 2023-01-24 RX ADMIN — ATORVASTATIN CALCIUM 80 MG: 80 TABLET, FILM COATED ORAL at 08:54

## 2023-01-24 NOTE — PLAN OF CARE
Problem: Discharge Planning  Goal: Discharge to home or other facility with appropriate resources  Outcome: Progressing  Flowsheets (Taken 1/23/2023 1753 by Solis Urbina, RN)  Discharge to home or other facility with appropriate resources:   Identify barriers to discharge with patient and caregiver   Arrange for needed discharge resources and transportation as appropriate   Identify discharge learning needs (meds, wound care, etc)   Arrange for interpreters to assist at discharge as needed   Refer to discharge planning if patient needs post-hospital services based on physician order or complex needs related to functional status, cognitive ability or social support system     Problem: Safety - Adult  Goal: Free from fall injury  1/23/2023 2146 by Derian Lott, RN  Outcome: Progressing  1/23/2023 Tenisha Orozco - [facility] by Solis Urbina, WINTER  Outcome: Progressing     Problem: ABCDS Injury Assessment  Goal: Absence of physical injury  Outcome: Progressing     Problem: Skin/Tissue Integrity  Goal: Absence of new skin breakdown  Description: 1. Monitor for areas of redness and/or skin breakdown  2. Assess vascular access sites hourly  3. Every 4-6 hours minimum:  Change oxygen saturation probe site  4. Every 4-6 hours:  If on nasal continuous positive airway pressure, respiratory therapy assess nares and determine need for appliance change or resting period.   Outcome: Progressing

## 2023-01-24 NOTE — CARE COORDINATION
Lives With: Son  Type of Home: House  Home Layout: Two level, Performs ADL's on one level, Able to Live on Main level with bedroom/bathroom  Home Access: Stairs to enter with rails  Entrance Stairs - Number of Steps: 3 ARIANA  Bathroom Shower/Tub: Tub/Shower unit  Bathroom Toilet: Standard  Bathroom Equipment: Shower chair, Grab bars in shower, Grab bars around toilet, 3-in-1 commode  Bathroom Accessibility: Walker accessible  Home Equipment: Cane, [employer], quad, Lift chair, Kinnie Czech, rolling, Wheelchair-manual  Has the patient had two or more falls in the past year or any fall with injury in the past year?: Yes  Receives Help From: Family, Home health ([facility] nursing)  ADL Assistance: Independent  Homemaking Responsibilities: Yes (Family assists with cooking but patient able to complete simple meal prep and  or family for cleaning)  Ambulation Assistance: Independent (Pt states she is able to get up and walk in her house with cane as needed, Pt states occasionally she needs assistance with transfers.)  Transfer Assistance: Independent  Active : No  Patient's  Info: Daughter     Lives with son. Has BSC, SC, grab bars, cane, quad cane, lift chair, rollator, RW, WC. I at baseline. Pt needs 24/7 care. No DME needs noted.

## 2023-01-24 NOTE — PROGRESS NOTES
Physical Therapy  Facility/Department: [facility]  Physical Therapy Initial Assessment    Name: Anthony Mann  : 1940  MRN: [id number]  Date of Service: 2023    Discharge Recommendations:  Continue to assess pending progress, 24 hour supervision or assist          Patient Diagnosis(es): The primary encounter diagnosis was Acute cystitis without hematuria. A diagnosis of Confusion was also pertinent to this visit. Past Medical History:  has a past medical history of Arthritis, CAD (coronary artery disease), Chronic kidney disease, Diabetes mellitus ([address] Hyperlipidemia, Hypertension, and Thyroid disease. Past Surgical History:  has a past surgical history that includes Cholecystectomy; Hysterectomy;  section; Colonoscopy; Cardiac surgery; Carotid endarterectomy (Right); and Carotid endarterectomy (Right). Assessment   Assessment: Pt was able to ambulate 100 feet with RW with good tolerance, occasional manual cueing needed to guide walker when turning in the suárez. She will benefit from skilled PT while in the acute setting to address safety with mobility and endurance to allow for return to optimal functional level. Pt would benefit from use of a RW or cane for safety when ambulating. PT educated pt's daughters in this and they were in agreement / understanding.   Treatment Diagnosis: confusion, mild safety deficits  Therapy Prognosis: Good  Decision Making: Low Complexity  Requires PT Follow-Up: Yes  Activity Tolerance  Activity Tolerance: Patient tolerated evaluation without incident     Plan   Safety Devices  Type of Devices: Left in bed, Call light within reach (left sitting EOB, nurse in room with pt upon PT leaving, 2 daughters also present in room.)     Restrictions  Restrictions/Precautions  Restrictions/Precautions: Fall Risk, General Precautions  Required Braces or Orthoses?: No     Subjective   General  Patient assessed for rehabilitation services?: Yes  Referring Practitioner: Abbey Guardado PA-C  Referral Date : 01/23/23  Diagnosis: Confusion  Subjective  Subjective: Pt presents sitting EOB, daughters present in room with her. Pt is pleasantly confused and agreeable to PT evaluation. Social/Functional History  Social/Functional History  Lives With: Son  Type of Home: House  Home Layout: Two level, Performs ADL's on one level, Able to Live on Main level with bedroom/bathroom  Home Access: Stairs to enter with rails  Entrance Stairs - Number of Steps: 3 ARIANA  Bathroom Shower/Tub: Tub/Shower unit  Bathroom Toilet: Standard  Bathroom Equipment: Shower chair, Grab bars in shower, Grab bars around toilet, 3-in-1 commode  Bathroom Accessibility: Walker accessible  Home Equipment: Cane, [place], quad, Lift chair, Silvino Sober, rolling, [employer]  Has the patient had two or more falls in the past year or any fall with injury in the past year?: Yes  Receives Help From: Family, Home health ([facility] nursing)  ADL Assistance: Independent  Homemaking Responsibilities: Yes (Family assists with cooking but patient able to complete simple meal prep and  or family for cleaning)  Ambulation Assistance: Independent (Pt states she is able to get up and walk in her house with cane as needed, Pt states occasionally she needs assistance with transfers)  Transfer Assistance: Independent  Active : No  Patient's  Info: Daughter    Vision/Hearing  Vision  Vision: Impaired  Vision Exceptions: Wears glasses for reading  Hearing  Hearing: Within functional limits      Cognition   Orientation  Overall Orientation Status: Impaired  Orientation Level: Oriented to person;Oriented to place; Disoriented to time     Objective   Heart Rate: 72  Heart Rate Source: Monitor  BP: (!) 149/83  BP Location: Right upper arm  BP Method: Manual  Patient Position: Supine  MAP (Calculated): 105  Resp: 18  SpO2: 94 %  O2 Device: None (Room air)     Observation/Palpation  Posture: Fair  Observation: Pt sittin g EOB, room air, pleasantly confused  Gross Assessment  AROM: Within functional limits  Strength: Within functional limits  Coordination: Generally decreased, functional                 Balance  Sitting: Intact  Standing: High guard  Gait  Overall Level of Assistance: Contact-guard assistance  Distance (ft): 120 Feet  Assistive Device: Gait belt (HHA)  Bed mobility  Bed Mobility Comments: Pt received seated upright at EOB  Transfers  Sit to Stand: Stand by assistance  Stand to Sit: Stand by assistance  Ambulation  Surface: Level tile  Device: Rolling Walker  Assistance: Contact guard assistance;Stand by assistance  Gait Deviations: Slow Lita;Decreased step length  Distance: 100'     Balance  Sitting - Static: Good  Sitting - Dynamic: Good  Standing - Static: Good  Standing - Dynamic: Good;-           Goals  Short Term Goals  Time Frame for Short Term Goals: 3 days  Short Term Goal 1: Pt to ambulate 250 feet with RW x SBA with good safety awareness  Short Term Goal 2: Pt to perform all bed mobility x Mod I  Short Term Goal 3: Pt to transfer bed to chair x SBA with good safety awareness. Short Term Goal 4: Family to be educated on safety modifications and guarding as needed for home       Therapy Time   Individual Concurrent Group Co-treatment   Time In [id number]         Time Out 1740         Minutes 13               This note serves as D/C summary if patient is discharged prior to next visit.     Ruben Johnson, PT

## 2023-01-24 NOTE — PROGRESS NOTES
Occupational Therapy    Facility/Department: [facility]  Occupational Therapy Initial Assessment    Name: Anthony Mann  : 1940  MRN: [id number]  Date of Service: 2023    Discharge Recommendations:  24 hour supervision or assist          Patient Diagnosis(es): The primary encounter diagnosis was Acute cystitis without hematuria. A diagnosis of Confusion was also pertinent to this visit. Past Medical History:  has a past medical history of Arthritis, CAD (coronary artery disease), Chronic kidney disease, Diabetes mellitus ([address] Hyperlipidemia, Hypertension, and Thyroid disease. Past Surgical History:  has a past surgical history that includes Cholecystectomy; Hysterectomy;  section; Colonoscopy; Cardiac surgery; Carotid endarterectomy (Right); and Carotid endarterectomy (Right). Assessment   Performance deficits / Impairments: Decreased safe awareness;Decreased balance;Decreased functional mobility ; Decreased endurance;Decreased high-level IADLs;Decreased cognition  Assessment: This 79year old female was referred to OT services upon admission following onset of acute encephalopathy. Pt is seated upright in chair upon entry. Pt is pleasantly confused. Pt oriented to person, place, but not time. Pt able to follow one step commands occasionally needing repetition. Pt completed LB dressing with SBA with increased timing to complete task. Pt come to stand with SBA and ambulated to bathroom with no AD HHA. Pt transferred to toilet CGA and completed toileting tasks SBA. Pt come to stand and ambulated to sink with CGA washing hands with SBA requiring cues to turn water off. Pt ambulated in hallway 120 feet with HHA no rest break needed. Pt required verbal cuing for spatial awareness and locating room. Pt transferred to recliner with CGA with chair alarm on. Pt will benefit from skilled OT services while IP.   Prognosis: Good  Decision Making: Low Complexity  REQUIRES OT FOLLOW-UP: Yes  Activity Tolerance  Activity Tolerance: Patient Tolerated treatment well;Treatment limited secondary to decreased cognition        Plan   Occupational Therapy Plan  Times Per Week: 3-5  Times Per Day: Once a day  Days Per Week: 5 Days  Therapy Duration: 4-7 Days  Current Treatment Recommendations: Strengthening, Balance training, Functional mobility training, Endurance training, Cognitive reorientation, Safety education & training, Patient/Caregiver education & training, Self-Care / ADL     Restrictions  Restrictions/Precautions  Restrictions/Precautions: Fall Risk, General Precautions  Required Braces or Orthoses?: No    Subjective   General  Chart Reviewed: Yes  Patient assessed for rehabilitation services?: Yes  Family / Caregiver Present: No  Referring Practitioner: MAGGIE Olivas  Diagnosis: acute encephalopathy  Subjective  Subjective: Pleasantly confused, agreeable to OT services.      Social/Functional History  Social/Functional History  Lives With: Son  Type of Home: House  Home Layout: Two level, Performs ADL's on one level, Able to Live on Main level with bedroom/bathroom  Home Access: Stairs to enter with rails  Entrance Stairs - Number of Steps: 3 ARIANA  Bathroom Shower/Tub: Tub/Shower unit  Bathroom Toilet: Standard  Bathroom Equipment: Shower chair, Grab bars in shower, Grab bars around toilet, 3-in-1 commode  Bathroom Accessibility: Walker accessible  Home Equipment: Cane, Kenneth Grounds, quad, Lift chair, Madalynn Ismael, rolling, Wheelchair-manual  Has the patient had two or more falls in the past year or any fall with injury in the past year?: Yes  Receives Help From: Family, Home health ([facility] nursing)  ADL Assistance: Independent  Homemaking Responsibilities: Yes (Family assists with cooking but patient able to complete simple meal prep and  or family for cleaning)  Ambulation Assistance: Independent (Pt states she is able to get up and walk in her house with cane as needed, Pt states occasionally she needs assistance with transfers.)  Transfer Assistance: Independent  Active : No  Patient's  Info: Daughter       Objective   Heart Rate: 75  Heart Rate Source: Monitor  BP: (!) 152/78  BP Location: Right upper arm  BP Method: Manual  Patient Position: Supine  MAP (Calculated): 103  Resp: 18  SpO2: 94 %  O2 Device: None (Room air)          Observation/Palpation  Observation: Pt seated upright in recliner upon entry on room air, pleasantly confused     Balance  Sitting: Intact  Standing: High guard  Gait  Overall Level of Assistance: Contact-guard assistance  Distance (ft): 120 Feet  Assistive Device: Gait belt (HHA)        ADL  Grooming: Supervision  LE Dressing: Stand by assistance; Increased time to complete  Toileting: Supervision        Bed mobility  Bed Mobility Comments: Pt received seated upright in recliner  Transfers  Stand Pivot Transfers: Contact guard assistance;Stand by assistance  Sit to stand: Stand by assistance  Vision  Vision: Impaired  Vision Exceptions: Wears glasses for reading  Hearing  Hearing: Within functional limits  Cognition  Cognition Comment: decreased STM recall, decreased safety awareness,  Orientation  Overall Orientation Status: Impaired  Orientation Level: Oriented to person;Oriented to place; Disoriented to time        Goals  Short Term Goals  Time Frame for Short Term Goals: 1 week  Short Term Goal 1: Pt to complete dressing with PALACIO. Short Term Goal 2: Pt to complete bathing with PALACIO. Short Term Goal 3: pt to complete toileting with MOD I. Short Term Goal 4: Pt to complete functional ADL transfers with MOD I. Short Term Goal 5: Pt to complete hygiene/grooming standing at sink with no LOB MOD I. Therapy Time   Individual Concurrent Group Co-treatment   Time In 4110         Time Out 1031         Minutes 32         note serves as a DC summary in the event of pt discharge.      Electronically signed by BETZY Jackson on 1/24/23 at 10:32 AM BEBA Kilgore, OTR/L

## 2023-01-24 NOTE — H&P
History and Physical    Patient:  Reymundo Saravia    CHIEF COMPLAINT:    Confusion    HISTORY OF PRESENT ILLNESS:   The patient is a 79 y.o. female with PMH of OA, CAD s/p PCI x 3, carotid artery disease s/p CEA, CKD3, DMII, HLD, HTN and hypothyroidism who presented to the ER via EMS with confusion. Patient remains confused today and unable to provide much of the HPI. HPI obtained from chart review and ED physician record. Per ED physician note, patient's daughter called EMS for evaluation of altered mental status. EMS also noted pt reeked of urine while in the ambulance. No report of fever, chills, CP, SOA, nausea, vomiting or diarrhea. Vitals upon arrival: /81, HR 93, RR 22, temp 98.1, O2 sat 97% RA. Labs: wbc 10.0, hgb 12.6, hct 38.1, plt 289, Na 134, K+ 4.4, Cl 96, CO2 24, BUN 39, cr 1.2, AG 14, lactic acid 1.8, alkaline phos 60, ALT 14, AST 16, bilirubin 0.4. UDS positive for tricyclic's. Flu and COVID not detected. UA with small bilirubin, trace ketones, small blood, greater than 300 protein, positive nitrates and small leukocyte Estrace. Urine microscopic with  WBCs, 5-10 RBCs, 4+ bacteria and small leukocyte Estrace. Chest x-ray with no acute cardiopulmonary disease. CT head with no acute abnormality. Patient was given IV Rocephin. She was then admitted for further care.     Past Medical History:      Diagnosis Date    Arthritis     CAD (coronary artery disease)     Chronic kidney disease     ? low functioning per pt    Diabetes mellitus ([address]     Hyperlipidemia     Hypertension     Thyroid disease        Past Surgical History:      Procedure Laterality Date    CARDIAC SURGERY      3 stents in  2018    CAROTID ENDARTERECTOMY Right     CAROTID ENDARTERECTOMY Right      SECTION      CHOLECYSTECTOMY      COLONOSCOPY      HYSTERECTOMY (CERVIX STATUS UNKNOWN)         Medications Prior to Admission:    Prior to Admission medications    Medication Sig Start Date End Date Taking?  Authorizing Provider   amitriptyline (ELAVIL) 10 MG tablet Take 10 mg by mouth nightly   Yes Historical Provider, MD   vitamin B-12 (CYANOCOBALAMIN) 100 MCG tablet Take 50 mcg by mouth daily   Yes Historical Provider, MD   cephALEXin (KEFLEX) 500 MG capsule Take 500 mg by mouth 2 times daily  Patient not taking: Reported on 1/23/2023    Historical Provider, MD   furosemide (LASIX) 20 MG tablet Take 1 tablet by mouth daily as needed (worsening swelling or weight gain > 3 lbs) Take 20mg by mouth every day for 4-5 days and stop for lower extremity edema 2/13/21   MAGGIE Ojeda   insulin lispro (HUMALOG) 100 UNIT/ML injection vial Inject 5 Units into the skin 3 times daily (before meals)  Patient taking differently: Inject 20 Units into the skin 3 times daily (before meals) This medication was last filled at [facility] on 6/2/2022 for a 25 day supply [facility] 1/23/2023) 2/13/21   MAGGIE Ojeda   omeprazole (PRILOSEC) 40 MG delayed release capsule Take 40 mg by mouth daily    Historical Provider, MD   cyclobenzaprine (FLEXERIL) 10 MG tablet Take 10 mg by mouth 3 times daily as needed for Muscle spasms  Patient not taking: Reported on 1/23/2023    Historical Provider, MD   ferrous sulfate (IRON 325) 325 (65 Fe) MG tablet Take 325 mg by mouth daily (with breakfast)    Historical Provider, MD   amLODIPine (NORVASC) 10 MG tablet Take 10 mg by mouth daily    Historical Provider, MD   folic acid (FOLVITE) 1 MG tablet Take 1 mg by mouth daily    Historical Provider, MD   fenofibrate (TRIGLIDE) 160 MG tablet Take 160 mg by mouth daily    Historical Provider, MD   atorvastatin (LIPITOR) 80 MG tablet Take 80 mg by mouth daily    Historical Provider, MD   clopidogrel (PLAVIX) 75 MG tablet Take 75 mg by mouth daily    Historical Provider, MD   doxepin (SINEQUAN) 100 MG capsule Take 100 mg by mouth nightly    Historical Provider, MD   ondansetron (ZOFRAN ODT) 4 MG disintegrating tablet Take 1 tablet by mouth every 8 hours as needed for Nausea or Vomiting  Patient not taking: Reported on 1/23/2023 5/27/19   Sixto Matthews, DO   nitroGLYCERIN (NITROSTAT) 0.4 MG SL tablet Take with onset of chest pain, up to max of 3 total doses 5 min apart. If no relief after 3 doses, call 911 or go to the Emergency Room 1/5/19   Kel Keita, DO   Calcium Carb-Cholecalciferol (CALCIUM+D3 PO) Take by mouth daily  Patient not taking: Reported on 1/23/2023    Historical Provider, MD   mometasone-formoterol [facility]) 200-5 MCG/ACT inhaler Inhale 2 puffs into the lungs 2 times daily  Patient not taking: Reported on 1/23/2023    Historical Provider, MD   loratadine (CLARITIN) 10 MG tablet Take 10 mg by mouth daily    Historical Provider, MD   fluticasone (FLONASE) 50 MCG/ACT nasal spray 1 spray by Nasal route daily Each nostril daily  Patient not taking: Reported on 1/23/2023    Historical Provider, MD   carvedilol (COREG) 25 MG tablet Take 25 mg by mouth 2 times daily (with meals). Historical Provider, MD   montelukast (SINGULAIR) 10 MG tablet Take 10 mg by mouth nightly. Historical Provider, MD   levothyroxine (SYNTHROID) 75 MCG tablet Take 75 mcg by mouth Daily. Historical Provider, MD   aspirin 81 MG chewable tablet Take 81 mg by mouth daily. Historical Provider, MD   oxyCODONE-acetaminophen (PERCOCET) 7.5-325 MG per tablet Take 1 tablet by mouth every 8 hours as needed. Historical Provider, MD   Insulin Glargine (LANTUS SC) Inject 75 Units into the skin daily     Historical Provider, MD       Allergies:  Patient has no known allergies. Social History:   TOBACCO:   reports that she has quit smoking. She has never used smokeless tobacco.  ETOH:   reports no history of alcohol use. OCCUPATION:  None     Family History:   No family history on file.     Review of system  Unable to obtain     Vital Signs  Temp: 97.9 °F (36.6 °C)  Heart Rate: 75  Resp: 18  BP: (!) 152/78  SpO2: 94 %  O2 Device: None (Room air)       vital signs reviewed in electronic chart. Physical exam  Constitutional:  Well developed, well nourished, elderly female sitting on edge of bed in no acute distress  Eyes:  no scleral icterus, conjunctiva normal   HENT:  Atraumatic, external ears normal, nose normal, oropharynx moist, no pharyngeal exudates. Neck- supple, no JVD, no lymphadenopathy  Respiratory:  No respiratory distress, no wheezing, rales or rhonchi detected  Cardiovascular:  Normal rate, normal rhythm, no murmurs, no gallops, no rubs, no edema   GI:  Soft, nondistended, normal bowel sounds, nontender, no voluntary guarding  Musculoskeletal:  No cyanosis or obvious acute deformity. Moving all extremities   Integument:  Warm and dry. Lymphatic:  No cervical or axillary lymphadenopathy noted   Neurologic:  Alert & oriented x 1, no apparent focal deficits noted   Psychiatric:  Speech and behavior appropriate         Lab Results   Component Value Date     01/24/2023    K 3.8 01/24/2023     01/24/2023    CO2 28 01/24/2023    BUN 37 (H) 01/24/2023    CREATININE 1.1 01/24/2023    GLUCOSE 156 (H) 01/24/2023    CALCIUM 9.6 01/24/2023    PROT 6.5 01/24/2023    LABALBU 3.6 01/24/2023    BILITOT 0.3 01/24/2023    ALKPHOS 51 01/24/2023    AST 17 01/24/2023    ALT 14 01/24/2023    LABGLOM 50 (L) 01/24/2023    GFRAA 43 (L) 08/19/2022    AGRATIO 1.2 01/24/2023    GLOB 2.9 01/24/2023           Lab Results   Component Value Date    WBC 8.4 01/24/2023    HGB 11.4 (L) 01/24/2023    HCT 34.3 (L) 01/24/2023    MCV 94.5 01/24/2023     01/24/2023       PA/lat CXR:   XR CHEST PORTABLE   Final Result      No acute cardiopulmonary disease. CT Head WO Contrast   Final Result      No acute abnormality. EKG: NSR, rate 89 bpm, UT interval 234 ms, QRS duration 88ms, , Qtc 471, no ST-T wave changes    Electronically signed by MAGGIE Madrid on 1/24/2023 at 10:03 AM    Assessment and Plan     Patient was seen and examined with MAGGIE Madrid.   Agree with note as above. Assessment and plan was done by me as below. Active Hospital Problems    Diagnosis Date Noted    UTI (urinary tract infection) [N39.0] 01/24/2023     Priority: Medium    Acute encephalopathy [G93.40] 01/23/2023     Priority: Medium    Stage 3 chronic kidney disease ([facility] [N18.30] 02/13/2021    Obesity (BMI 30-39. 9) [E66.9] 02/13/2021    Benign essential HTN [I10] 02/13/2021    Other specified hypothyroidism [E03.8] 02/13/2021   Mental status changes likely related to her UTI complicated by possible underlying dementia and mild dehydration. Treat with antibiotics and follow culture. Adjust antibiotics accordingly. Frequent orientation. Monitor vital signs closely. Make sure blood pressure under good control. Try to avoid any nephrotoxic agent. Proceed with checking TSH, X16 and folic acid. May consider starting dementia meds if no improvement in her mental status with treating her acute medical issues. PT/OT evaluation.       Veronica Betancourt MD certifies per CMS regulation for 42 .15(a), that the patient may reasonably be expected to be discharged or transferred to a hospital within 96 hours after admission to [address] [place] [address]      Electronically signed by Veronica Betancourt MD on 1/24/2023 at 10:49 PM

## 2023-01-25 LAB
A/G RATIO: 1.3 (ref 0.8–2)
ALBUMIN SERPL-MCNC: 3.5 G/DL (ref 3.4–4.8)
ALP BLD-CCNC: 53 U/L (ref 25–100)
ALT SERPL-CCNC: 14 U/L (ref 4–36)
ANION GAP SERPL CALCULATED.3IONS-SCNC: 10 MMOL/L (ref 3–16)
AST SERPL-CCNC: 19 U/L (ref 8–33)
BILIRUB SERPL-MCNC: 0.3 MG/DL (ref 0.3–1.2)
BUN BLDV-MCNC: 40 MG/DL (ref 6–20)
CALCIUM SERPL-MCNC: 9.2 MG/DL (ref 8.5–10.5)
CHLORIDE BLD-SCNC: 102 MMOL/L (ref 98–107)
CO2: 26 MMOL/L (ref 20–30)
CREAT SERPL-MCNC: 1.1 MG/DL (ref 0.4–1.2)
FOLATE: >20 NG/ML
GFR SERPL CREATININE-BSD FRML MDRD: 50 ML/MIN/{1.73_M2}
GLOBULIN: 2.8 G/DL
GLUCOSE BLD-MCNC: 132 MG/DL (ref 74–106)
GLUCOSE BLD-MCNC: 143 MG/DL (ref 74–106)
GLUCOSE BLD-MCNC: 174 MG/DL (ref 74–106)
GLUCOSE BLD-MCNC: 219 MG/DL (ref 74–106)
GLUCOSE BLD-MCNC: 293 MG/DL (ref 74–106)
HCT VFR BLD CALC: 33.1 % (ref 37–47)
HEMOGLOBIN: 11 G/DL (ref 11.5–16.5)
MCH RBC QN AUTO: 31.2 PG (ref 27–32)
MCHC RBC AUTO-ENTMCNC: 33.2 G/DL (ref 31–35)
MCV RBC AUTO: 93.8 FL (ref 80–100)
ORGANISM: ABNORMAL
ORGANISM: ABNORMAL
PDW BLD-RTO: 11.9 % (ref 11–16)
PERFORMED ON: ABNORMAL
PLATELET # BLD: 246 K/UL (ref 150–400)
PMV BLD AUTO: 9.7 FL (ref 6–10)
POTASSIUM REFLEX MAGNESIUM: 3.9 MMOL/L (ref 3.4–5.1)
RBC # BLD: 3.53 M/UL (ref 3.8–5.8)
SODIUM BLD-SCNC: 138 MMOL/L (ref 136–145)
TOTAL PROTEIN: 6.3 G/DL (ref 6.4–8.3)
TSH REFLEX: 3.24 UIU/ML (ref 0.27–4.2)
URINE CULTURE, ROUTINE: ABNORMAL
URINE CULTURE, ROUTINE: ABNORMAL
VITAMIN B-12: 370 PG/ML (ref 211–911)
WBC # BLD: 7.4 K/UL (ref 4–11)

## 2023-01-25 PROCEDURE — 80053 COMPREHEN METABOLIC PANEL: CPT

## 2023-01-25 PROCEDURE — 99231 SBSQ HOSP IP/OBS SF/LOW 25: CPT | Performed by: INTERNAL MEDICINE

## 2023-01-25 PROCEDURE — 85027 COMPLETE CBC AUTOMATED: CPT

## 2023-01-25 PROCEDURE — 97116 GAIT TRAINING THERAPY: CPT

## 2023-01-25 PROCEDURE — 97530 THERAPEUTIC ACTIVITIES: CPT

## 2023-01-25 PROCEDURE — 96372 THER/PROPH/DIAG INJ SC/IM: CPT

## 2023-01-25 PROCEDURE — 2580000003 HC RX 258: Performed by: PHYSICIAN ASSISTANT

## 2023-01-25 PROCEDURE — 6360000002 HC RX W HCPCS: Performed by: PHYSICIAN ASSISTANT

## 2023-01-25 PROCEDURE — 36415 COLL VENOUS BLD VENIPUNCTURE: CPT

## 2023-01-25 PROCEDURE — 97535 SELF CARE MNGMENT TRAINING: CPT

## 2023-01-25 PROCEDURE — 84443 ASSAY THYROID STIM HORMONE: CPT

## 2023-01-25 PROCEDURE — 6370000000 HC RX 637 (ALT 250 FOR IP): Performed by: PHYSICIAN ASSISTANT

## 2023-01-25 PROCEDURE — 82746 ASSAY OF FOLIC ACID SERUM: CPT

## 2023-01-25 PROCEDURE — G0378 HOSPITAL OBSERVATION PER HR: HCPCS

## 2023-01-25 PROCEDURE — 96366 THER/PROPH/DIAG IV INF ADDON: CPT

## 2023-01-25 PROCEDURE — 82607 VITAMIN B-12: CPT

## 2023-01-25 PROCEDURE — 1200000000 HC SEMI PRIVATE

## 2023-01-25 RX ORDER — AMLODIPINE BESYLATE 5 MG/1
10 TABLET ORAL DAILY
Status: DISCONTINUED | OUTPATIENT
Start: 2023-01-26 | End: 2023-01-26 | Stop reason: HOSPADM

## 2023-01-25 RX ADMIN — CARVEDILOL 25 MG: 25 TABLET, FILM COATED ORAL at 17:25

## 2023-01-25 RX ADMIN — DOXEPIN HYDROCHLORIDE 100 MG: 25 CAPSULE ORAL at 21:33

## 2023-01-25 RX ADMIN — ENOXAPARIN SODIUM 40 MG: 100 INJECTION SUBCUTANEOUS at 08:23

## 2023-01-25 RX ADMIN — INSULIN GLARGINE 75 UNITS: 100 INJECTION, SOLUTION SUBCUTANEOUS at 08:31

## 2023-01-25 RX ADMIN — CARVEDILOL 25 MG: 25 TABLET, FILM COATED ORAL at 08:21

## 2023-01-25 RX ADMIN — AMITRIPTYLINE HYDROCHLORIDE 10 MG: 10 TABLET, FILM COATED ORAL at 21:33

## 2023-01-25 RX ADMIN — ATORVASTATIN CALCIUM 80 MG: 80 TABLET, FILM COATED ORAL at 08:22

## 2023-01-25 RX ADMIN — ASPIRIN 81 MG 81 MG: 81 TABLET ORAL at 08:22

## 2023-01-25 RX ADMIN — INSULIN LISPRO 4 UNITS: 100 INJECTION, SOLUTION INTRAVENOUS; SUBCUTANEOUS at 17:25

## 2023-01-25 RX ADMIN — PANTOPRAZOLE SODIUM 40 MG: 40 TABLET, DELAYED RELEASE ORAL at 06:32

## 2023-01-25 RX ADMIN — CETIRIZINE HYDROCHLORIDE 10 MG: 10 TABLET, FILM COATED ORAL at 08:22

## 2023-01-25 RX ADMIN — FERROUS SULFATE TAB EC 324 MG (65 MG FE EQUIVALENT) 324 MG: 324 (65 FE) TABLET DELAYED RESPONSE at 08:22

## 2023-01-25 RX ADMIN — CLOPIDOGREL BISULFATE 75 MG: 75 TABLET, FILM COATED ORAL at 08:23

## 2023-01-25 RX ADMIN — CEFTRIAXONE 1000 MG: 1 INJECTION, POWDER, FOR SOLUTION INTRAMUSCULAR; INTRAVENOUS at 14:11

## 2023-01-25 RX ADMIN — VITAM B12 50 MCG: 100 TAB at 08:22

## 2023-01-25 RX ADMIN — OXYCODONE HYDROCHLORIDE AND ACETAMINOPHEN 1 TABLET: 7.5; 325 TABLET ORAL at 03:47

## 2023-01-25 RX ADMIN — MONTELUKAST SODIUM 10 MG: 10 TABLET, COATED ORAL at 21:33

## 2023-01-25 RX ADMIN — INSULIN LISPRO 2 UNITS: 100 INJECTION, SOLUTION INTRAVENOUS; SUBCUTANEOUS at 11:08

## 2023-01-25 RX ADMIN — LEVOTHYROXINE SODIUM 75 MCG: 75 TABLET ORAL at 06:32

## 2023-01-25 RX ADMIN — FOLIC ACID 1 MG: 1 TABLET ORAL at 08:22

## 2023-01-25 ASSESSMENT — PAIN DESCRIPTION - ORIENTATION: ORIENTATION: RIGHT;LEFT

## 2023-01-25 ASSESSMENT — PAIN SCALES - GENERAL
PAINLEVEL_OUTOF10: 6
PAINLEVEL_OUTOF10: 4

## 2023-01-25 ASSESSMENT — PAIN DESCRIPTION - DESCRIPTORS: DESCRIPTORS: ACHING

## 2023-01-25 ASSESSMENT — PAIN DESCRIPTION - LOCATION: LOCATION: LEG

## 2023-01-25 NOTE — PROGRESS NOTES
Physical Therapy  Facility/Department: [facility] MED SURG  Daily Treatment Note  NAME: June Shah  : 1940  MRN: [id number]    Date of Service: 2023    Discharge Recommendations:  Continue to assess pending progress, 24 hour supervision or assist      Patient Diagnosis(es): The primary encounter diagnosis was Acute cystitis without hematuria. A diagnosis of Confusion was also pertinent to this visit. Assessment   Assessment: Cotreated with OT. Patient transitioned supine to sit with cues and Mod I. Stood with SBA and ambulated with RW and CGA/SBA to the bathroom. Patient completed toileting, sponge bathing, and clothing change with cues for sequencing activities while seated on the toilet. Patient stood at the sink mirror to comb her hair. Ambulated in the hallway ~120 feet with RW, CGA, and cues for proper walker sequencing as patient does not use one at home. Patient transferred to the recliner upon return to the room. Activity Tolerance: Patient tolerated treatment well     Plan    Physical Therapy Plan  General Plan: 3-5 times per week  Therapy Duration: 4-7 Days  Current Treatment Recommendations: Strengthening; Functional mobility training;Transfer training;Gait training;Patient/Caregiver education & training     Restrictions  Restrictions/Precautions  Restrictions/Precautions: Fall Risk, General Precautions  Required Braces or Orthoses?: No     Subjective    Subjective  Subjective: Patient presents awake in bed, NAD. States she would like to get cleaned up.      Objective      Bed Mobility Training  Bed Mobility Training: No  Overall Level of Assistance: Modified independent  Balance  Sitting: Intact  Standing: High guard  Transfer Training  Transfer Training: Yes  Overall Level of Assistance: Contact-guard assistance;Stand-by assistance  Sit to Stand: Stand-by assistance  Stand to Sit: Stand-by assistance  Gait Training  Gait Training: Yes  Gait  Overall Level of Assistance: Contact-guard assistance  Distance (ft): 120 Feet  Assistive Device: Walker, rolling;Gait belt     Safety Devices  Type of Devices: Left in chair;Chair alarm in place;Call light within reach     Goals  Short Term Goals  Time Frame for Short Term Goals: 3 days  Short Term Goal 1: Pt to ambulate 250 feet with RW x SBA with good safety awareness  Short Term Goal 2: Pt to perform all bed mobility x Mod I  Short Term Goal 3: Pt to transfer bed to chair x SBA with good safety awareness. Short Term Goal 4: Family to be educated on safety modifications and guarding as needed for home    Therapy Time   Individual Concurrent Group Co-treatment   Time In 2416         Time Out 6779         Minutes 34             This note serves as D/C summary if patient is discharged prior to next visit.    Rosy Daniel, PTA 49 y m came to the ed with sob and increased work of breathing. patient states getting back from a car ride from [place]. states having a cough all week and difficulty breathing both which worsened when he lay flat. patient states having a hx of pulmonary issues for which he is being seen out patient by a 9/11 md. states having a low O2 level in the 80s at baseline. patient is a/ox3. denies any chest pain, n/v/d. abdomen is distended. states bilateral leg swelling is worse than usual. abdomen is soft and nontender. skin is warm and dry. states having decreased sensation on the left side of his body which is residual from a cva he suffered in 2015.

## 2023-01-25 NOTE — FLOWSHEET NOTE
01/25/23 0835   Assessment   Charting Type Shift assessment   Psychosocial   Psychosocial (WDL) WDL   Neurological   Neuro (WDL) X   Level of Consciousness 0   Orientation Level Oriented to person;Disoriented to place; Disoriented to time;Disoriented to situation   Cognition Poor safety awareness;Poor attention/concentration;Poor judgement   Speech Clear   F[place] Coma Scale   Eye Opening 4   Best Verbal Response 4   Best Motor Response 6   Desmond Coma Scale Score 14   HEENT (Head, Ears, Eyes, Nose, & Throat)   HEENT (WDL) X   Right Eye Blurred   Left Eye Blurred   Teeth Edentulous  (dentures at home)   Respiratory   Respiratory (WDL) WDL   Cardiac   Cardiac (WDL) WDL   Gastrointestinal   Abdominal (WDL) X   Abdomen Inspection Rounded; Soft   RUQ Bowel Sounds Active   LUQ Bowel Sounds Active   RLQ Bowel Sounds Active   LLQ Bowel Sounds Active   Tenderness Soft   Genitourinary   Genitourinary (WDL) X   Urine Frequency   Urine Frequency TONY   Urine Urgency   Urine Urgency TONY   Urine Assessment   Urine Color Yellow/straw   Urine Appearance Cloudy   Urine Odor No odor   Peripheral Vascular   Peripheral Vascular (WDL) WDL   Skin Integumentary    Skin Integumentary (WDL) X   Skin Color Pale   Musculoskeletal   Musculoskeletal (WDL) X   RUE Weakness   LUE Weakness   RL Extremity Weakness   LL Extremity Weakness

## 2023-01-25 NOTE — PROGRESS NOTES
Progress Note      Subjective:   Chief complaint:   Confusion    Interval History:   Patient seen and examined this morning. She remains confused but suspect this is her baseline. She also lives with her son at home. Urine culture growing E. coli and Klebsiella pneumoniae. Awaiting sensitivities for E. coli. Review of systems:   Unable to fully obtain    Past medical history, surgical history, family history and social history reviewed and unchanged compared to H&P earlier this admission. Medications:   Scheduled Meds:   ferrous sulfate  324 mg Oral Daily with breakfast    enoxaparin  40 mg SubCUTAneous Daily    cefTRIAXone (ROCEPHIN) IV  1,000 mg IntraVENous Q24H    amitriptyline  10 mg Oral Nightly    aspirin  81 mg Oral Daily    atorvastatin  80 mg Oral Daily    carvedilol  25 mg Oral BID WC    clopidogrel  75 mg Oral Daily    doxepin  100 mg Oral Nightly    folic acid  1 mg Oral Daily    insulin glargine  75 Units SubCUTAneous Daily    insulin lispro  0-8 Units SubCUTAneous TID WC    insulin lispro  0-4 Units SubCUTAneous Nightly    levothyroxine  75 mcg Oral Daily    cetirizine  10 mg Oral Daily    montelukast  10 mg Oral Nightly    pantoprazole  40 mg Oral QAM AC    vitamin B-12  50 mcg Oral Daily     Continuous Infusions:    Objective:     Vital Signs  Temp: 97.9 °F (36.6 °C)  Heart Rate: 64  Resp: 18  BP: (!) 142/57  SpO2: 91 %  O2 Device: None (Room air)       Vital signs reviewed in electronic charts. Physical exam  Constitutional:  Well developed, well nourished, elderly female sitting on edge of bed in no acute distress  Eyes:  no scleral icterus, conjunctiva normal   HENT:  Atraumatic, external ears normal, nose normal, oropharynx moist, no pharyngeal exudates.  Neck- supple, no JVD, no lymphadenopathy  Respiratory:  No respiratory distress, no wheezing, rales or rhonchi detected  Cardiovascular:  Normal rate, normal rhythm, no murmurs, no gallops, no rubs, no edema   GI:  Soft, nondistended, normal bowel sounds, nontender, no voluntary guarding  Musculoskeletal:  No cyanosis or obvious acute deformity. Moving all extremities   Integument:  Warm and dry. Lymphatic:  No cervical or axillary lymphadenopathy noted   Neurologic:  Alert & oriented x 2 (person and place), no apparent focal deficits noted   Psychiatric:  Speech and behavior appropriate     Results:     Lab Results   Component Value Date    WBC 7.4 01/25/2023    HGB 11.0 (L) 01/25/2023    HCT 33.1 (L) 01/25/2023    MCV 93.8 01/25/2023     01/25/2023       Lab Results   Component Value Date/Time     01/25/2023 05:20 AM    K 3.9 01/25/2023 05:20 AM     01/25/2023 05:20 AM    CO2 26 01/25/2023 05:20 AM    BUN 40 01/25/2023 05:20 AM    CREATININE 1.1 01/25/2023 05:20 AM    GLUCOSE 143 01/25/2023 05:20 AM    CALCIUM 9.2 01/25/2023 05:20 AM        Electronically signed by MAGGIE Chow on 1/25/2023 at 11:41 AM    Assessment and Plan:     Patient was seen and examined with  MAGGIE Chow. Agree with note as above. Assessment and plan was done by me as below. Active Hospital Problems    Diagnosis Date Noted    UTI (urinary tract infection) [N39.0] 01/24/2023     Priority: Medium    Acute encephalopathy [G93.40] 01/23/2023     Priority: Medium    Stage 3 chronic kidney disease ([address] [N18.30] 02/13/2021    Obesity (BMI 30-39. 9) [E66.9] 02/13/2021    Benign essential HTN [I10] 02/13/2021    Other specified hypothyroidism [E03.8] 02/13/2021   Patient seems to be clinically better with antibiotics, gentle hydration and other supportive and symptomatic treatment. Unfortunately continued to be pleasantly confused and unsafe to be discharged specially with the lack of social support and 24/7 help. J47, TSH and folic acid within normal limit. Monitor blood pressure closely and renal function. Consider starting dementia meds tomorrow if no better.       Electronically signed by Catia Maradiaga MD on 1/25/2023 at 11:10 PM

## 2023-01-25 NOTE — PROGRESS NOTES
Occupational Therapy  Facility/Department: [facility] MED SURG  Daily Treatment Note  NAME: Vandana Painter  : 1940  MRN: [id number]    Date of Service: 2023    Discharge Recommendations:  24 hour supervision or assist    Patient Diagnosis(es): The primary encounter diagnosis was Acute cystitis without hematuria. A diagnosis of Confusion was also pertinent to this visit. Assessment    Assessment: Pt lying in bed upon entry. Pt pleasant and cooperative agreeable to therapy services. Pt transferred to sitting at EOB with MOD I. Pt required verbal cuing to scoot at EOB with SUP. Pt donned socks seated at EOB with SBA with min verbal cuing. Pt come to stand with SBA and ambulated to bathroom with CGA<>SBA. Pt ambulated to bathroom with RW with verbal cuing for walker safety and walker proximity. Pt transferred to toilet with SBA and completed toileting with SUP. Pt completed sponge bathing seated on toilet. Pt completed UB bathing with PALACIO. Pt provided with deoderant requiring max verbal cuing to sequence task and appropriately apply deoderant. Pt attempting to put deoderant on hands. After correction pt applied deoderant and attempted to appy on other hand when cued to apply to opposing arm pit. Pt cued for correction. Pt completed LB bathing with SBA tolerating well. Pt donned brief seated on toilet with SBA increased time to complete task. Pt come to stand and ambulated to sink with RW with MOD verbal cuing to use walker and safe walker techniques standing at sink. Pt combed hair standing at sink with no LOB. Pt ambulated out of room and in hallway ~120 feet with RW with CGA verbal cuing for safety with use of RW. Pt returned to recliner and PALACIO with call light in reach and chair alarm in place. Activity Tolerance: Patient tolerated treatment well  Discharge Recommendations: 24 hour supervision or assist   Subjective   Subjective  Subjective: I want to get cleaned up.     Objective    Vitals     Bed Mobility Training  Bed Mobility Training: No  Balance  Sitting: Intact  Standing: High guard  Gait  Overall Level of Assistance: Contact-guard assistance  Distance (ft): 120 Feet  Assistive Device: Gait belt;Walker, rolling     ADL  Grooming: Supervision;Verbal cueing  UE Bathing: Supervision  LE Bathing: Stand by assistance  UE Dressing: Stand by assistance  LE Dressing: Stand by assistance; Increased time to complete  Toileting: Supervision       Goals  Short Term Goals  Time Frame for Short Term Goals: 1 week  Short Term Goal 1: Pt to complete dressing with PALACIO. Short Term Goal 2: Pt to complete bathing with PALACIO. Short Term Goal 3: pt to complete toileting with MOD I. Short Term Goal 4: Pt to complete functional ADL transfers with MOD I. Short Term Goal 5: Pt to complete hygiene/grooming standing at sink with no LOB MOD I. Therapy Time   Individual Concurrent Group Co-treatment   Time In 6787         Time Out 1322         Minutes 34          This note serves as a DC summary in the event of pt discharge.     Melody Kilgore OTR/L

## 2023-01-26 VITALS
TEMPERATURE: 97.5 F | BODY MASS INDEX: 32.92 KG/M2 | SYSTOLIC BLOOD PRESSURE: 153 MMHG | WEIGHT: 204.8 LBS | DIASTOLIC BLOOD PRESSURE: 65 MMHG | OXYGEN SATURATION: 94 % | RESPIRATION RATE: 18 BRPM | HEIGHT: 66 IN | HEART RATE: 66 BPM

## 2023-01-26 LAB
A/G RATIO: 1.1 (ref 0.8–2)
ALBUMIN SERPL-MCNC: 3.3 G/DL (ref 3.4–4.8)
ALP BLD-CCNC: 56 U/L (ref 25–100)
ALT SERPL-CCNC: 16 U/L (ref 4–36)
ANION GAP SERPL CALCULATED.3IONS-SCNC: 9 MMOL/L (ref 3–16)
AST SERPL-CCNC: 23 U/L (ref 8–33)
BILIRUB SERPL-MCNC: 0.3 MG/DL (ref 0.3–1.2)
BUN BLDV-MCNC: 33 MG/DL (ref 6–20)
CALCIUM SERPL-MCNC: 9.2 MG/DL (ref 8.5–10.5)
CHLORIDE BLD-SCNC: 106 MMOL/L (ref 98–107)
CO2: 27 MMOL/L (ref 20–30)
CREAT SERPL-MCNC: 1 MG/DL (ref 0.4–1.2)
GFR SERPL CREATININE-BSD FRML MDRD: 56 ML/MIN/{1.73_M2}
GLOBULIN: 2.9 G/DL
GLUCOSE BLD-MCNC: 213 MG/DL (ref 74–106)
GLUCOSE BLD-MCNC: 78 MG/DL (ref 74–106)
GLUCOSE BLD-MCNC: 84 MG/DL (ref 74–106)
HCT VFR BLD CALC: 34.8 % (ref 37–47)
HEMOGLOBIN: 11.4 G/DL (ref 11.5–16.5)
MCH RBC QN AUTO: 30.8 PG (ref 27–32)
MCHC RBC AUTO-ENTMCNC: 32.8 G/DL (ref 31–35)
MCV RBC AUTO: 94.1 FL (ref 80–100)
PDW BLD-RTO: 11.7 % (ref 11–16)
PERFORMED ON: ABNORMAL
PERFORMED ON: NORMAL
PLATELET # BLD: 249 K/UL (ref 150–400)
PMV BLD AUTO: 10.2 FL (ref 6–10)
POTASSIUM REFLEX MAGNESIUM: 3.7 MMOL/L (ref 3.4–5.1)
RBC # BLD: 3.7 M/UL (ref 3.8–5.8)
SODIUM BLD-SCNC: 142 MMOL/L (ref 136–145)
TOTAL PROTEIN: 6.2 G/DL (ref 6.4–8.3)
WBC # BLD: 7 K/UL (ref 4–11)

## 2023-01-26 PROCEDURE — 80053 COMPREHEN METABOLIC PANEL: CPT

## 2023-01-26 PROCEDURE — 99238 HOSP IP/OBS DSCHRG MGMT 30/<: CPT | Performed by: INTERNAL MEDICINE

## 2023-01-26 PROCEDURE — 36415 COLL VENOUS BLD VENIPUNCTURE: CPT

## 2023-01-26 PROCEDURE — 97535 SELF CARE MNGMENT TRAINING: CPT

## 2023-01-26 PROCEDURE — 6360000002 HC RX W HCPCS: Performed by: PHYSICIAN ASSISTANT

## 2023-01-26 PROCEDURE — 85027 COMPLETE CBC AUTOMATED: CPT

## 2023-01-26 PROCEDURE — 6370000000 HC RX 637 (ALT 250 FOR IP): Performed by: PHYSICIAN ASSISTANT

## 2023-01-26 RX ORDER — INSULIN LISPRO 100 [IU]/ML
INJECTION, SOLUTION INTRAVENOUS; SUBCUTANEOUS
Qty: 5 ADJUSTABLE DOSE PRE-FILLED PEN SYRINGE | Refills: 0 | Status: SHIPPED | OUTPATIENT
Start: 2023-01-26

## 2023-01-26 RX ORDER — INSULIN LISPRO 100 [IU]/ML
0-4 INJECTION, SOLUTION INTRAVENOUS; SUBCUTANEOUS NIGHTLY
Status: DISCONTINUED | OUTPATIENT
Start: 2023-01-26 | End: 2023-01-26

## 2023-01-26 RX ORDER — INSULIN LISPRO 100 [IU]/ML
0-4 INJECTION, SOLUTION INTRAVENOUS; SUBCUTANEOUS
Status: DISCONTINUED | OUTPATIENT
Start: 2023-01-26 | End: 2023-01-26

## 2023-01-26 RX ORDER — CEFDINIR 300 MG/1
300 CAPSULE ORAL 2 TIMES DAILY
Qty: 10 CAPSULE | Refills: 0 | Status: SHIPPED | OUTPATIENT
Start: 2023-01-26 | End: 2023-01-31

## 2023-01-26 RX ADMIN — ENOXAPARIN SODIUM 40 MG: 100 INJECTION SUBCUTANEOUS at 08:51

## 2023-01-26 RX ADMIN — LEVOTHYROXINE SODIUM 75 MCG: 75 TABLET ORAL at 05:55

## 2023-01-26 RX ADMIN — OXYCODONE HYDROCHLORIDE AND ACETAMINOPHEN 1 TABLET: 7.5; 325 TABLET ORAL at 08:45

## 2023-01-26 RX ADMIN — CARVEDILOL 25 MG: 25 TABLET, FILM COATED ORAL at 08:39

## 2023-01-26 RX ADMIN — VITAM B12 50 MCG: 100 TAB at 08:40

## 2023-01-26 RX ADMIN — AMLODIPINE BESYLATE 10 MG: 5 TABLET ORAL at 08:39

## 2023-01-26 RX ADMIN — FOLIC ACID 1 MG: 1 TABLET ORAL at 08:39

## 2023-01-26 RX ADMIN — PANTOPRAZOLE SODIUM 40 MG: 40 TABLET, DELAYED RELEASE ORAL at 05:55

## 2023-01-26 RX ADMIN — CLOPIDOGREL BISULFATE 75 MG: 75 TABLET, FILM COATED ORAL at 08:39

## 2023-01-26 RX ADMIN — ATORVASTATIN CALCIUM 80 MG: 80 TABLET, FILM COATED ORAL at 08:39

## 2023-01-26 RX ADMIN — FERROUS SULFATE TAB EC 324 MG (65 MG FE EQUIVALENT) 324 MG: 324 (65 FE) TABLET DELAYED RESPONSE at 08:39

## 2023-01-26 RX ADMIN — ACETAMINOPHEN 650 MG: 325 TABLET, FILM COATED ORAL at 05:56

## 2023-01-26 RX ADMIN — CETIRIZINE HYDROCHLORIDE 10 MG: 10 TABLET, FILM COATED ORAL at 08:39

## 2023-01-26 RX ADMIN — INSULIN LISPRO 2 UNITS: 100 INJECTION, SOLUTION INTRAVENOUS; SUBCUTANEOUS at 11:39

## 2023-01-26 RX ADMIN — ASPIRIN 81 MG 81 MG: 81 TABLET ORAL at 08:39

## 2023-01-26 ASSESSMENT — PAIN SCALES - GENERAL
PAINLEVEL_OUTOF10: 5
PAINLEVEL_OUTOF10: 8

## 2023-01-26 ASSESSMENT — PAIN DESCRIPTION - LOCATION: LOCATION: LEG;HEAD

## 2023-01-26 NOTE — PLAN OF CARE
Problem: Discharge Planning  Goal: Discharge to home or other facility with appropriate resources  1/26/2023 0934 by Gregorio Beasley RN  Outcome: Progressing  Flowsheets (Taken 1/26/2023 0800)  Discharge to home or other facility with appropriate resources: Identify barriers to discharge with patient and caregiver  1/25/2023 2314 by Clair Lynn RN  Outcome: Progressing     Problem: Safety - Adult  Goal: Free from fall injury  1/26/2023 0934 by Gregorio Beasley RN  Outcome: Progressing  1/25/2023 2314 by Clair Lynn RN  Outcome: Progressing     Problem: ABCDS Injury Assessment  Goal: Absence of physical injury  1/26/2023 0934 by Gregorio Beasley RN  Outcome: Progressing  1/25/2023 2314 by Clair Lynn RN  Outcome: Progressing     Problem: Skin/Tissue Integrity  Goal: Absence of new skin breakdown  Description: 1. Monitor for areas of redness and/or skin breakdown  2. Assess vascular access sites hourly  3. Every 4-6 hours minimum:  Change oxygen saturation probe site  4. Every 4-6 hours:  If on nasal continuous positive airway pressure, respiratory therapy assess nares and determine need for appliance change or resting period.   1/26/2023 0934 by Gregorio Beasley RN  Outcome: Progressing  1/25/2023 2314 by Clair Lynn RN  Outcome: Progressing     Problem: Chronic Conditions and Co-morbidities  Goal: Patient's chronic conditions and co-morbidity symptoms are monitored and maintained or improved  1/26/2023 0934 by Gregorio Beasley RN  Outcome: Progressing  Flowsheets (Taken 1/26/2023 0800)  Care Plan - Patient's Chronic Conditions and Co-Morbidity Symptoms are Monitored and Maintained or Improved:   Monitor and assess patient's chronic conditions and comorbid symptoms for stability, deterioration, or improvement   Collaborate with multidisciplinary team to address chronic and comorbid conditions and prevent exacerbation or deterioration   Update acute care plan with appropriate goals if chronic or comorbid symptoms are exacerbated and prevent overall improvement and discharge  1/25/2023 2314 by Warden Duong RN  Outcome: Progressing     Problem: Pain  Goal: Verbalizes/displays adequate comfort level or baseline comfort level  Outcome: Progressing

## 2023-01-26 NOTE — PLAN OF CARE
Problem: Discharge Planning  Goal: Discharge to home or other facility with appropriate resources  1/26/2023 1158 by Monica Carlson RN  Outcome: Adequate for Discharge  1/26/2023 0934 by Monica Carlson RN  Outcome: Progressing  Flowsheets (Taken 1/26/2023 0800)  Discharge to home or other facility with appropriate resources: Identify barriers to discharge with patient and caregiver  1/25/2023 2314 by Ramona Worrell RN  Outcome: Progressing     Problem: Safety - Adult  Goal: Free from fall injury  1/26/2023 1158 by Monica Carlson RN  Outcome: Adequate for Discharge  1/26/2023 0934 by Monica Carlson RN  Outcome: Progressing  1/25/2023 2314 by Ramona Worrell RN  Outcome: Progressing     Problem: ABCDS Injury Assessment  Goal: Absence of physical injury  1/26/2023 1158 by Monica Carlson RN  Outcome: Adequate for Discharge  1/26/2023 0934 by Monica Carlson RN  Outcome: Progressing  1/25/2023 2314 by Ramona Worrell RN  Outcome: Progressing     Problem: Skin/Tissue Integrity  Goal: Absence of new skin breakdown  Description: 1. Monitor for areas of redness and/or skin breakdown  2. Assess vascular access sites hourly  3. Every 4-6 hours minimum:  Change oxygen saturation probe site  4. Every 4-6 hours:  If on nasal continuous positive airway pressure, respiratory therapy assess nares and determine need for appliance change or resting period.   1/26/2023 1158 by Monica Carlson RN  Outcome: Adequate for Discharge  1/26/2023 2750 by Monica Carlson RN  Outcome: Progressing  1/25/2023 2314 by Ramona Worrell RN  Outcome: Progressing     Problem: Chronic Conditions and Co-morbidities  Goal: Patient's chronic conditions and co-morbidity symptoms are monitored and maintained or improved  1/26/2023 1158 by Monica Carlson RN  Outcome: Adequate for Discharge  1/26/2023 0934 by Monica Carlsno RN  Outcome: Progressing  Flowsheets (Taken 1/26/2023 0800)  Care Plan - Patient's Chronic Conditions and Co-Morbidity Symptoms are Monitored and Maintained or Improved:   Monitor and assess patient's chronic conditions and comorbid symptoms for stability, deterioration, or improvement   Collaborate with multidisciplinary team to address chronic and comorbid conditions and prevent exacerbation or deterioration   Update acute care plan with appropriate goals if chronic or comorbid symptoms are exacerbated and prevent overall improvement and discharge  1/25/2023 2314 by Madeleine Boston RN  Outcome: Progressing     Problem: Pain  Goal: Verbalizes/displays adequate comfort level or baseline comfort level  1/26/2023 1158 by Sherley Nixon RN  Outcome: Adequate for Discharge  1/26/2023 0934 by Sherley Nixon RN  Outcome: Progressing

## 2023-01-26 NOTE — PROGRESS NOTES
Checked in with patient and family at discharge. Pt family complimented our meds to beds program stated it was very convenient for them. Patients daughter had a few questions about home health services. Chaitanya Pineda RN -  contacted for patient.

## 2023-01-26 NOTE — ACP (ADVANCE CARE PLANNING)
Advance Care Planning     General Advance Care Planning (ACP) Conversation    Conducted with:  Healthcare Decision Maker: Next of Kin by law (only applies in absence of above) (name) per nursing on admit    Healthcare Decision Maker:    Primary Decision Maker: [address] - [phone]  Today we documented Decision Maker(s) consistent with Legal Next of Kin hierarchy. Content/Action Overview:  Has NO ACP documents/care preferences - information provided, considering goals and options  Reviewed DNR/DNI and patient elects Full Code (Attempt Resuscitation)    Length of Voluntary ACP Conversation in minutes:  <16 minutes (Non-Billable)    01/26/2023 - CORRECTED PHONE NUMBERS AND UPDATED:  Brent's phone number is [phone] per pt and her personal phone. Pt states her Daughter, Srini Pink, is also involved. Her number is [phone], but when CM tried to call, she states she wasn't related to Mrs. Cotton.

## 2023-01-26 NOTE — FLOWSHEET NOTE
01/26/23 0800   Assessment   Charting Type Shift assessment   Psychosocial   Psychosocial (WDL) WDL   Neurological   Neuro (WDL) X   Level of Consciousness 0   Orientation Level Oriented to person;Disoriented to place; Disoriented to time;Disoriented to situation   Cognition Poor safety awareness;Poor attention/concentration;Poor judgement; Follows commands   Speech Clear   R Hand  Strong   L Hand  Strong   [place] Coma Scale   Eye Opening 4   Best Verbal Response 4   Best Motor Response 6   Desmond Coma Scale Score 14   HEENT (Head, Ears, Eyes, Nose, & Throat)   HEENT (WDL) X   Right Eye Blurred   Left Eye Blurred   Teeth Edentulous  (dentures at home)   Respiratory   Respiratory (WDL) WDL   Cardiac   Cardiac (WDL) WDL   Gastrointestinal   Abdominal (WDL) X   Abdomen Inspection Rounded; Soft   RUQ Bowel Sounds Active   LUQ Bowel Sounds Active   RLQ Bowel Sounds Active   LLQ Bowel Sounds Active   Tenderness Soft   Genitourinary   Genitourinary (WDL) X   Urine Assessment   Urinary Status Voiding   Urine Color Yellow/straw   Urine Appearance Cloudy   Peripheral Vascular   Peripheral Vascular (WDL) WDL   Skin Integumentary    Skin Integumentary (WDL) X   Skin Color Pale   Care Plan - Skin/Tissue Integrity Goals   Skin Integrity Remains Intact Monitor for areas of redness and/or skin breakdown;Assess vascular access sites hourly   Musculoskeletal   Musculoskeletal (WDL) X   RUE Weakness   LUE Weakness   RL Extremity Weakness   LL Extremity Weakness   Care Plan - Chronic Conditions and Co-Morbidity   Care Plan - Patient's Chronic Conditions and Co-Morbidity Symptoms are Monitored and Maintained or Improved Monitor and assess patient's chronic conditions and comorbid symptoms for stability, deterioration, or improvement;Collaborate with multidisciplinary team to address chronic and comorbid conditions and prevent exacerbation or deterioration;Update acute care plan with appropriate goals if chronic or comorbid symptoms are exacerbated and prevent overall improvement and discharge   Care Plan - Discharge Planning Goals   Discharge to home or other facility with appropriate resources Identify barriers to discharge with patient and caregiver

## 2023-01-26 NOTE — CARE COORDINATION
The Plan for Transition of Care is related to the following treatment goals: home with family assist.      The Patient and/or patient representative was provided with a choice of provider and agrees with the discharge plan. [x] Yes [] No    Freedom of choice list was provided with basic dialogue that supports the patient's individualized plan of care/goals, treatment preferences and shares the quality data associated with the providers. [x] Yes [] No    Pt is wanting to go home. CA got hold of daughter, Stanford Dumnot, and she is coming to get her this afternoon/evening. No DME needs noted at this time. Addendum:  Family requesting home health at [place]. Pt states she has assistance for home making and someone to \"run for her\" already - most likely this is a waiver. Orders for [facility] sent to [facility] in [place] as family has no preference of providers.

## 2023-01-26 NOTE — PROGRESS NOTES
Order to dc noted, iv dcd, pt and family verbalize understanding of dc instructions, home with family pov, wheeled out by staff, nad noted

## 2023-01-26 NOTE — PROGRESS NOTES
Occupational Therapy  Facility/Department: [facility] MED SURG  Daily Treatment Note  NAME: Ami Randolph  : 1940  MRN: [id number]    Date of Service: 2023    Discharge Recommendations:  24 hour supervision or assist     Patient Diagnosis(es): The primary encounter diagnosis was Acute cystitis without hematuria. A diagnosis of Confusion was also pertinent to this visit. Assessment    Assessment: Pt received this am seated in recliner and agreeable to therapy services. Pt transferred to standing with SBA and ambulated to bathroom with RW SBA. Pt completed toileting with SUP. Pt transferred to shower with SUP after OT demonstration for safe shower technique. Pt followed through without difficulty. Pt sat on shower chair and completed bathing UB with SUP and LB with SBA. Pt demo good safety. Pt completed grooming with SUP. Pt donned LB clothing with SBA with good stability. Pt transferred from shower with SBA. Pt ambulated from bathroom to bed with RW SBA and transferred supine with MOD I. Pt left with call light in reach. Activity Tolerance: Patient tolerated treatment well  Discharge Recommendations: 24 hour supervision or assist      Subjective   Subjective  Subjective: I got up early this am       Objective    Vitals    ADL  Grooming: Setup  UE Bathing: Setup  LE Bathing: Supervision  UE Dressing: Stand by assistance  LE Dressing: Stand by assistance  Toileting: Supervision     Goals  Short Term Goals  Time Frame for Short Term Goals: 1 week  Short Term Goal 1: Pt to complete dressing with PALACIO. Short Term Goal 2: Pt to complete bathing with PALACIO. Short Term Goal 3: pt to complete toileting with MOD I. Short Term Goal 4: Pt to complete functional ADL transfers with MOD I. Short Term Goal 5: Pt to complete hygiene/grooming standing at sink with no LOB MOD I.        Therapy Time   Individual Concurrent Group Co-treatment   Time In 0845         Time Out 6382         Minutes 29          This note serves as a DC summary in the event of pt discharge.       Melody Kilgore, OTR/L

## 2023-01-26 NOTE — DISCHARGE SUMMARY
Discharge Summary      Patient ID: Elizabet Tan      Patient's PCP: None None    Admit Date: 1/23/2023     Discharge Date:   1/26/2023    Admitting Provider: Meek Matson MD    Discharging Provider: MAGGIE Holland     Reason for this admission:   Acute encephalopathy  E.Coli and Klebsiella pneumonia UTI    Discharge Diagnoses: Active Hospital Problems    Diagnosis Date Noted    Acute encephalopathy [G93.40] 01/23/2023     Priority: High    UTI (urinary tract infection) [N39.0] 01/24/2023     Priority: Medium    Stage 3 chronic kidney disease ([address] [N18.30] 02/13/2021    Obesity (BMI 30-39. 9) [E66.9] 02/13/2021    Benign essential HTN [I10] 02/13/2021    Other specified hypothyroidism [E03.8] 02/13/2021       Procedures:  XR CHEST PORTABLE   Final Result      No acute cardiopulmonary disease. CT Head WO Contrast   Final Result      No acute abnormality. Consults:   IP CONSULT TO CASE MANAGEMENT  PT OT    Briefly:   79 y.o. female with PMH of OA, CAD s/p PCI x 3, carotid artery disease s/p CEA, CKD3, DMII, HLD, HTN and hypothyroidism who was admitted for acute encephalopathy secondary to E.coli and Klebsiella pneumoniae UTI. Treated with Rocephin during admission. Sensitivities reviewed and pt will be transitioned to [facility] on discharge. Hospital Course: Active Hospital Problems    Diagnosis Date Noted    Acute encephalopathy [G93.40] 01/23/2023     Priority: High    UTI (urinary tract infection) [N39.0] 01/24/2023     Priority: Medium    Stage 3 chronic kidney disease ([address] [N18.30] 02/13/2021    Obesity (BMI 30-39. 9) [E66.9] 02/13/2021    Benign essential HTN [I10] 02/13/2021    Other specified hypothyroidism [E03.8] 02/13/2021     Pt improved with IVFs and antibiotics. MS at baseline with treatment. B12, TSH and folate were wnl. Pt will be transitioned to [facility] on discharge.     Disposition: home    Discharged Condition: Stable    Vital Signs  Temp: 97.5 °F (36.4 °C)  Heart Rate: 66  Resp: 18  BP: (!) 153/65  SpO2: 94 %  O2 Device: None (Room air)       Vital signs reviewed in electronic chart. Physical exam  Constitutional:  Well developed, well nourished, elderly female sitting on edge of bed in no acute distress  Eyes:  no scleral icterus, conjunctiva normal   HENT:  Atraumatic, external ears normal, nose normal, oropharynx moist, no pharyngeal exudates. Neck- supple, no JVD, no lymphadenopathy  Respiratory:  No respiratory distress, no wheezing, rales or rhonchi detected  Cardiovascular:  Normal rate, normal rhythm, no murmurs, no gallops, no rubs, no edema   GI:  Soft, nondistended, normal bowel sounds, nontender, no voluntary guarding  Musculoskeletal:  No cyanosis or obvious acute deformity. Moving all extremities   Integument:  Warm and dry. Lymphatic:  No cervical or axillary lymphadenopathy noted   Neurologic:  Alert & oriented x 2 (person and place), no apparent focal deficits noted   Psychiatric:  Speech and behavior appropriate       Activity: activity as tolerated  Diet: regular diet  Follow Up: Primary Care Physician in 1 week    Labs:  For convenience and continuity at follow-up the following most recent labs are provided:    CBC:   Lab Results   Component Value Date/Time    WBC 7.0 01/26/2023 04:25 AM    HGB 11.4 01/26/2023 04:25 AM    HCT 34.8 01/26/2023 04:25 AM     01/26/2023 04:25 AM       RENAL:   Lab Results   Component Value Date/Time     01/26/2023 04:25 AM    K 3.7 01/26/2023 04:25 AM     01/26/2023 04:25 AM    CO2 27 01/26/2023 04:25 AM    BUN 33 01/26/2023 04:25 AM    CREATININE 1.0 01/26/2023 04:25 AM         Discharge Medications:     Current Discharge Medication List             Details   cefdinir (OMNICEF) 300 MG capsule Take 1 capsule by mouth 2 times daily for 5 days  Qty: 10 capsule, Refills: 0      insulin lispro, 1 Unit Dial, (HUMALOG KWIKPEN) 100 UNIT/ML SOPN Use per low dose SSI  Qty: 5 Adjustable Dose Pre-filled Pen Syringe, Refills: 0                Details   amitriptyline (ELAVIL) 10 MG tablet Take 10 mg by mouth nightly      vitamin B-12 (CYANOCOBALAMIN) 100 MCG tablet Take 50 mcg by mouth daily      furosemide (LASIX) 20 MG tablet Take 1 tablet by mouth daily as needed (worsening swelling or weight gain > 3 lbs) Take 20mg by mouth every day for 4-5 days and stop for lower extremity edema  Qty: 60 tablet, Refills: 3      omeprazole (PRILOSEC) 40 MG delayed release capsule Take 40 mg by mouth daily      ferrous sulfate (IRON 325) 325 (65 Fe) MG tablet Take 325 mg by mouth daily (with breakfast)      amLODIPine (NORVASC) 10 MG tablet Take 10 mg by mouth daily      folic acid (FOLVITE) 1 MG tablet Take 1 mg by mouth daily      fenofibrate (TRIGLIDE) 160 MG tablet Take 160 mg by mouth daily      atorvastatin (LIPITOR) 80 MG tablet Take 80 mg by mouth daily      clopidogrel (PLAVIX) 75 MG tablet Take 75 mg by mouth daily      doxepin (SINEQUAN) 100 MG capsule Take 100 mg by mouth nightly      nitroGLYCERIN (NITROSTAT) 0.4 MG SL tablet Take with onset of chest pain, up to max of 3 total doses 5 min apart. If no relief after 3 doses, call 911 or go to the Emergency Room  Qty: 25 tablet, Refills: 0      mometasone-formoterol (DULERA) 200-5 MCG/ACT inhaler Inhale 2 puffs into the lungs 2 times daily      loratadine (CLARITIN) 10 MG tablet Take 10 mg by mouth daily      carvedilol (COREG) 25 MG tablet Take 25 mg by mouth 2 times daily (with meals). montelukast (SINGULAIR) 10 MG tablet Take 10 mg by mouth nightly. levothyroxine (SYNTHROID) 75 MCG tablet Take 75 mcg by mouth Daily. aspirin 81 MG chewable tablet Take 81 mg by mouth daily. oxyCODONE-acetaminophen (PERCOCET) 7.5-325 MG per tablet Take 1 tablet by mouth every 8 hours as needed. Insulin Glargine (LANTUS SC) Inject 75 Units into the skin daily               Patient was seen and examined by Dr. Jeremi Ayala and plan of care reviewed.   Treatment plan was formulated collaboratively. Time spent: 14 minutes    Signed:  Electronically signed by MAGGIE Alvarado on 1/26/2023 at 10:57 AM       Thank you None None for the opportunity to be involved in this patient's care. If you have any questions or concerns please feel free to contact me at [phone]. Pt c/o blurry vision/not tested

## 2023-01-26 NOTE — PROGRESS NOTES
CLINICAL PHARMACY NOTE: MEDS TO BEDS    Total # of Prescriptions Filled: 2   The following medications were delivered to the patient:  Current Discharge Medication List        START taking these medications    Details   cefdinir (OMNICEF) 300 MG capsule Take 1 capsule by mouth 2 times daily for 5 days  Qty: 10 capsule, Refills: 0      insulin lispro, 1 Unit Dial, (HUMALOG KWIKPEN) 100 UNIT/ML SOPN Use per low dose SSI  Qty: 5 Adjustable Dose Pre-filled Pen Syringe, Refills: 0             Additional Documentation:   Medications were delivered to patient's bedside and signed for by patient

## 2023-01-26 NOTE — FLOWSHEET NOTE
01/25/23 2135   Assessment   Charting Type Shift assessment   Psychosocial   Psychosocial (WDL) WDL   Neurological   Neuro (WDL) X   Level of Consciousness 0   Orientation Level Oriented to person;Disoriented to place; Disoriented to time;Disoriented to situation   Cognition Poor safety awareness;Poor attention/concentration;Poor judgement   Speech Clear   Perrin Coma Scale   Eye Opening 4   Best Verbal Response 4   Best Motor Response 6   Desmond Coma Scale Score 14   HEENT (Head, Ears, Eyes, Nose, & Throat)   HEENT (WDL) X   Right Eye Blurred   Left Eye Blurred   Teeth Edentulous  (dentures at home)   Respiratory   Respiratory (WDL) WDL   Cardiac   Cardiac (WDL) WDL   Gastrointestinal   Abdominal (WDL) X   Abdomen Inspection Rounded; Soft   Last BM (including prior to admit) 01/23/23   RUQ Bowel Sounds Active   LUQ Bowel Sounds Active   RLQ Bowel Sounds Active   LLQ Bowel Sounds Active   Tenderness Soft   Genitourinary   Genitourinary (WDL) X   Urine Frequency   Urine Frequency TONY   Urine Urgency   Urine Urgency TONY   Urine Assessment   Urine Color Yellow/straw   Urine Appearance Cloudy   Peripheral Vascular   Peripheral Vascular (WDL) WDL   Skin Integumentary    Skin Integumentary (WDL) X   Skin Color Pale   Musculoskeletal   Musculoskeletal (WDL) X   RUE Weakness   LUE Weakness   RL Extremity Weakness   LL Extremity Weakness

## 2023-01-27 LAB
BLOOD CULTURE, ROUTINE: NORMAL
CULTURE, BLOOD 2: NORMAL

## 2023-01-27 NOTE — ADT AUTH CERT
Criteria Review   Name: Ian Rondon   : 1940   CSN: [id number]   INSURANCE: [facility] Medicare    Date of admission: 23    Current status: Observation    We recommend that patient's status is upgraded to INPATIENT; if you agree, please place a new ADMIT order in [facility] as recommended. Rationale: persistent AMS, pending urine cx sensitivities, on IV abx     Clinical summary UTI/AMS  Vitals stable  Labs and Imaging Urine culture growing E. coli and Klebsiella pneumoniae. Awaiting sensitivities for E. coli.   Status decision based on clinical judgment and Commercial Utilization criteria, e.g., MCG, Interqual     This chart was reviewed at 1:57 PM on 2023    Angela Bill MD   Physician Geoffrey Baer[address]   CELL : [phone]        Urinary Tract Infection (UTI) - Care Day 3 (2023) by Thor Jimenes RN       Review Status Review Entered   Completed 2023 0855       Created By   Thor Jimenes RN      Criteria Review      Care Day: 3 Care Date: 2023 Level of Care: Inpatient Floor    Guideline Day 2    Level Of Care    (X) Floor    (X) Social Determinants of Health Assessment    2023 8:55 AM EST by Jennifer Myrick      unsafe to be discharged specially with the lack of social support and / help    Clinical Status    (X) * Hypotension absent    2023 8:55 AM EST by Jennifer Myrick      BP: (!) 142/57    (X) * Mental status at baseline    2023 8:55 AM EST by Jennifer Myrick      She remains confused but suspect this is her baseline    (X) * Afebrile or fever improved    2023 8:55 AM EST by Jennifer Myrick      Temp: 97.9 °F (36.6 °C)    (X) * Vomiting absent or improved    Activity    (X) * Ambulatory    Routes    (X) Advance diet as tolerated    2023 8:55 AM EST by Elizabeth Kaur DIET; Easy to Chew; 3 carb choices (45 gm/meal)    Interventions    (X) * Reversible urinary system abnormality (eg, obstruction, abscess) absent, addressed, or to be treated at next level of care    (X) WBC    1/26/2023 8:55 AM EST by [place] On Gauley Vidal      wbc 7.4    (X) Renal function tests    1/26/2023 8:55 AM EST by Elijah Drake      bun 40  creat 1.1    Medications    (X) Antibiotics    1/26/2023 8:55 AM EST by Elijah Vidal      cefTRIAXone (ROCEPHIN) IV 1,000 [id number]    ( ) Possible analgesics    1/26/2023 8:55 AM EST by Elijah Drake      oxyCODONE-acetaminophen (PERCOCET) 7.5-325 MG per tablet 1 tablet  Dose: 1 tablet  Freq: EVERY 8 HOURS PRN Route: PO x1       Definitions for Care Day 3    Hypotension absent    (X) Hypotension absent, as indicated by  1 or more  of the following  (1) (2) (3) (4):       (X) SBP greater than or equal to 90 mm Hg and without recent decrease greater than 40 mm Hg from       baseline in adult or child 10 years or older       * Milestone   Additional Notes   DATE: 1/25/2023   LOC:CONVERTED TO INPATIENT         PERTINENT UPDATES:   Patient seen and examined this morning. She remains confused but suspect this is her baseline. She also lives with her son at home. Urine culture growing E. coli and Klebsiella pneumoniae. Awaiting sensitivities for E. coli. VITALS:   Temp: 97.9 °F (36.6 °C)   Heart Rate: 64   Resp: 18   BP: (!) 142/57   SpO2: 91 %   O2 Device: None (Room air)      ABNL/PERTINENT LABS/RADIOLOGY/DIAGNOSTIC STUDIES:   WBC 7.4   HGB 11.0            K 3.9      CO2 26      PHYSICAL EXAM:   Constitutional:  Well developed, well nourished, elderly female sitting on edge of bed in no acute distress   Eyes:  no scleral icterus, conjunctiva normal    HENT:  Atraumatic, external ears normal, nose normal, oropharynx moist, no pharyngeal exudates.  Neck- supple, no JVD, no lymphadenopathy   Respiratory:  No respiratory distress, no wheezing, rales or rhonchi detected   Cardiovascular:  Normal rate, normal rhythm, no murmurs, no gallops, no rubs, no edema    GI:  Soft, nondistended, normal bowel sounds, nontender, no voluntary guarding   Musculoskeletal:  No cyanosis or obvious acute deformity. Moving all extremities    Integument:  Warm and dry.   Lymphatic:  No cervical or axillary lymphadenopathy noted    Neurologic:  Alert & oriented x 2 (person and place), no apparent focal deficits noted    Psychiatric:  Speech and behavior appropriate           MD CONSULTS/ASSESSMENT AND PLAN:   Active Hospital Problems     Diagnosis Date Noted   · UTI (urinary tract infection) [N39.0] 01/24/2023       Priority: Medium   · Acute encephalopathy [G93.40] 01/23/2023       Priority: Medium   · Stage 3 chronic kidney disease (HCC) [N18.30] 02/13/2021   · Obesity (BMI 30-39.9) [E66.9] 02/13/2021   · Benign essential HTN [I10] 02/13/2021   · Other specified hypothyroidism [E03.8] 02/13/2021   Patient seems to be clinically better with antibiotics, gentle hydration and other supportive and symptomatic treatment.  Unfortunately continued to be pleasantly confused and unsafe to be discharged specially with the lack of social support and 24/7 help.  B12, TSH and folic acid within normal limit.  Monitor blood pressure closely and renal function.  Consider starting dementia meds tomorrow if no better.         MEDICATIONS:   ferrous sulfate 324 mg Oral Daily with breakfast    · enoxaparin 40 mg SubCUTAneous Daily   · cefTRIAXone (ROCEPHIN) IV 1,000 mg IntraVENous Q24H   · amitriptyline 10 mg Oral Nightly   · aspirin 81 mg Oral Daily   · atorvastatin 80 mg Oral Daily   · carvedilol 25 mg Oral BID WC   · clopidogrel 75 mg Oral Daily   · doxepin 100 mg Oral Nightly   · folic acid 1 mg Oral Daily   · insulin glargine 75 Units SubCUTAneous Daily   · insulin lispro 0-8 Units SubCUTAneous TID WC   · insulin lispro 0-4 Units SubCUTAneous Nightly   · levothyroxine 75 mcg Oral Daily   · cetirizine 10 mg Oral Daily   · montelukast 10 mg Oral Nightly   · pantoprazole 40 mg Oral QAM AC   · vitamin B-12 50 mcg Oral Daily      ORDERS:   ROUTINE VITALS   CONSULT CASE MANAGEMENT        PT/OT/SLP/CM ASSESSMENT OR NOTES:   Physical Therapy   Assessment: Cotreated with OT. Patient transitioned supine to sit with cues and Mod I. Stood with SBA and ambulated with RW and CGA/SBA to the bathroom. Patient completed toileting, sponge bathing, and clothing change with cues for sequencing activities while seated on the toilet. Patient stood at the sink mirror to comb her hair. Ambulated in the hallway ~120 feet with RW, CGA, and cues for proper walker sequencing as patient does not use one at home. Patient transferred to the recliner upon return to the room.    Activity Tolerance: Patient tolerated treatment well

## 2023-02-23 PROBLEM — N39.0 UTI (URINARY TRACT INFECTION): Status: RESOLVED | Noted: 2023-01-24 | Resolved: 2023-02-23

## 2023-07-17 ENCOUNTER — APPOINTMENT (OUTPATIENT)
Dept: GENERAL RADIOLOGY | Facility: HOSPITAL | Age: 83
End: 2023-07-17
Attending: EMERGENCY MEDICINE
Payer: MEDICARE

## 2023-07-17 ENCOUNTER — HOSPITAL ENCOUNTER (EMERGENCY)
Facility: HOSPITAL | Age: 83
Discharge: HOME | End: 2023-07-17
Attending: EMERGENCY MEDICINE
Payer: MEDICARE

## 2023-07-17 ENCOUNTER — APPOINTMENT (OUTPATIENT)
Dept: CT IMAGING | Facility: HOSPITAL | Age: 83
End: 2023-07-17
Attending: EMERGENCY MEDICINE
Payer: MEDICARE

## 2023-07-17 VITALS
TEMPERATURE: 97.8 F | RESPIRATION RATE: 16 BRPM | DIASTOLIC BLOOD PRESSURE: 76 MMHG | HEART RATE: 92 BPM | SYSTOLIC BLOOD PRESSURE: 181 MMHG | OXYGEN SATURATION: 96 % | WEIGHT: 210 LBS | BODY MASS INDEX: 33.75 KG/M2 | HEIGHT: 66 IN

## 2023-07-17 DIAGNOSIS — I95.1 ORTHOSTATIC HYPOTENSION: ICD-10-CM

## 2023-07-17 DIAGNOSIS — N30.00 ACUTE CYSTITIS WITHOUT HEMATURIA: Primary | ICD-10-CM

## 2023-07-17 LAB
ALBUMIN SERPL-MCNC: 3.4 G/DL (ref 3.4–4.8)
ALBUMIN/GLOB SERPL: 1 {RATIO} (ref 0.8–2)
ALP SERPL-CCNC: 86 U/L (ref 25–100)
ALT SERPL-CCNC: 11 U/L (ref 4–36)
ANION GAP SERPL CALCULATED.3IONS-SCNC: 9 MMOL/L (ref 3–16)
AST SERPL-CCNC: 13 U/L (ref 8–33)
BACTERIA URNS QL MICRO: ABNORMAL /HPF
BASOPHILS # BLD: 0 K/UL (ref 0–0.1)
BASOPHILS NFR BLD: 0.4 %
BILIRUB SERPL-MCNC: 0.5 MG/DL (ref 0.3–1.2)
BILIRUB UR QL STRIP.AUTO: NEGATIVE
BUN SERPL-MCNC: 33 MG/DL (ref 6–20)
CALCIUM SERPL-MCNC: 9.5 MG/DL (ref 8.5–10.5)
CHLORIDE SERPL-SCNC: 99 MMOL/L (ref 98–107)
CLARITY UR: ABNORMAL
CO2 SERPL-SCNC: 26 MMOL/L (ref 20–30)
COLOR UR: YELLOW
CREAT SERPL-MCNC: 1.2 MG/DL (ref 0.4–1.2)
EOSINOPHIL # BLD: 0.1 K/UL (ref 0–0.4)
EOSINOPHIL NFR BLD: 0.6 %
EPI CELLS #/AREA URNS HPF: ABNORMAL /HPF (ref 0–5)
ERYTHROCYTE [DISTWIDTH] IN BLOOD BY AUTOMATED COUNT: 12.8 % (ref 11–16)
GFR SERPLBLD CREATININE-BSD FMLA CKD-EPI: 45 ML/MIN/{1.73_M2}
GLOBULIN SER CALC-MCNC: 3.3 G/DL
GLUCOSE BLD-MCNC: 318 MG/DL (ref 74–106)
GLUCOSE SERPL-MCNC: 287 MG/DL (ref 74–106)
GLUCOSE UR STRIP.AUTO-MCNC: 500 MG/DL
HCT VFR BLD AUTO: 39.9 % (ref 37–47)
HGB BLD-MCNC: 13.2 G/DL (ref 11.5–16.5)
HGB UR QL STRIP.AUTO: ABNORMAL
IMM GRANULOCYTES # BLD: 0 K/UL
IMM GRANULOCYTES NFR BLD: 0.3 % (ref 0–5)
KETONES UR STRIP.AUTO-MCNC: NEGATIVE MG/DL
LACTATE BLDV-SCNC: 1.8 MMOL/L (ref 0.4–2)
LEUKOCYTE ESTERASE UR QL STRIP.AUTO: ABNORMAL
LYMPHOCYTES # BLD: 1.1 K/UL (ref 1.5–4)
LYMPHOCYTES NFR BLD: 11.5 %
MCH RBC QN AUTO: 31 PG (ref 27–32)
MCHC RBC AUTO-ENTMCNC: 33.1 G/DL (ref 31–35)
MCV RBC AUTO: 93.7 FL (ref 80–100)
MONOCYTES # BLD: 0.6 K/UL (ref 0.2–0.8)
MONOCYTES NFR BLD: 6.1 %
MUCOUS THREADS URNS QL MICRO: ABNORMAL /LPF
NEUTROPHILS # BLD: 7.9 K/UL (ref 2–7.5)
NEUTS SEG NFR BLD: 81.1 %
NITRITE UR QL STRIP.AUTO: NEGATIVE
PERFORMED ON: ABNORMAL
PH UR STRIP.AUTO: 6 [PH] (ref 5–8)
PLATELET # BLD AUTO: 211 K/UL (ref 150–400)
PMV BLD AUTO: 10.8 FL (ref 6–10)
POTASSIUM SERPL-SCNC: 4.8 MMOL/L (ref 3.4–5.1)
PROT SERPL-MCNC: 6.7 G/DL (ref 6.4–8.3)
PROT UR STRIP.AUTO-MCNC: >=300 MG/DL
RBC # BLD AUTO: 4.26 M/UL (ref 3.8–5.8)
RBC #/AREA URNS HPF: ABNORMAL /HPF (ref 0–4)
REASON FOR REJECTION: NORMAL
REASON FOR REJECTION: NORMAL
REJECTED TEST: NORMAL
REJECTED TEST: NORMAL
SODIUM SERPL-SCNC: 134 MMOL/L (ref 136–145)
SP GR UR STRIP.AUTO: >=1.03 (ref 1–1.03)
TSH SERPL-MCNC: 2.04 UIU/ML (ref 0.27–4.2)
UA COMPLETE W REFLEX CULTURE PNL UR: YES
UA DIPSTICK W REFLEX MICRO PNL UR: YES
URN SPEC COLLECT METH UR: ABNORMAL
UROBILINOGEN UR STRIP-ACNC: 0.2 E.U./DL
WBC # BLD AUTO: 9.8 K/UL (ref 4–11)
WBC #/AREA URNS HPF: >100 /HPF (ref 0–5)

## 2023-07-17 PROCEDURE — 93005 ELECTROCARDIOGRAM TRACING: CPT

## 2023-07-17 PROCEDURE — 99285 EMERGENCY DEPT VISIT HI MDM: CPT

## 2023-07-17 PROCEDURE — 83605 ASSAY OF LACTIC ACID: CPT

## 2023-07-17 PROCEDURE — 81001 URINALYSIS AUTO W/SCOPE: CPT

## 2023-07-17 PROCEDURE — 87088 URINE BACTERIA CULTURE: CPT

## 2023-07-17 PROCEDURE — 96374 THER/PROPH/DIAG INJ IV PUSH: CPT

## 2023-07-17 PROCEDURE — 2580000003 HC RX 258: Performed by: EMERGENCY MEDICINE

## 2023-07-17 PROCEDURE — 87086 URINE CULTURE/COLONY COUNT: CPT

## 2023-07-17 PROCEDURE — 36415 COLL VENOUS BLD VENIPUNCTURE: CPT

## 2023-07-17 PROCEDURE — 87186 SC STD MICRODIL/AGAR DIL: CPT

## 2023-07-17 PROCEDURE — 84443 ASSAY THYROID STIM HORMONE: CPT

## 2023-07-17 PROCEDURE — 71045 X-RAY EXAM CHEST 1 VIEW: CPT

## 2023-07-17 PROCEDURE — 80053 COMPREHEN METABOLIC PANEL: CPT

## 2023-07-17 PROCEDURE — 70450 CT HEAD/BRAIN W/O DYE: CPT

## 2023-07-17 PROCEDURE — 85025 COMPLETE CBC W/AUTO DIFF WBC: CPT

## 2023-07-17 PROCEDURE — 6360000002 HC RX W HCPCS: Performed by: EMERGENCY MEDICINE

## 2023-07-17 RX ORDER — CEPHALEXIN 500 MG/1
500 CAPSULE ORAL 4 TIMES DAILY
Qty: 28 CAPSULE | Refills: 0 | Status: SHIPPED | OUTPATIENT
Start: 2023-07-17 | End: 2023-07-24

## 2023-07-17 RX ORDER — 0.9 % SODIUM CHLORIDE 0.9 %
1000 INTRAVENOUS SOLUTION INTRAVENOUS ONCE
Status: COMPLETED | OUTPATIENT
Start: 2023-07-17 | End: 2023-07-17

## 2023-07-17 RX ADMIN — WATER 2000 MG: 1 INJECTION INTRAMUSCULAR; INTRAVENOUS; SUBCUTANEOUS at 11:55

## 2023-07-17 RX ADMIN — SODIUM CHLORIDE 1000 ML: 9 INJECTION, SOLUTION INTRAVENOUS at 09:46

## 2023-07-17 ASSESSMENT — LIFESTYLE VARIABLES
HOW OFTEN DO YOU HAVE A DRINK CONTAINING ALCOHOL: NEVER
HOW MANY STANDARD DRINKS CONTAINING ALCOHOL DO YOU HAVE ON A TYPICAL DAY: PATIENT DOES NOT DRINK

## 2023-07-17 ASSESSMENT — PAIN SCALES - GENERAL: PAINLEVEL_OUTOF10: 0

## 2023-07-17 ASSESSMENT — PAIN - FUNCTIONAL ASSESSMENT
PAIN_FUNCTIONAL_ASSESSMENT: NONE - DENIES PAIN
PAIN_FUNCTIONAL_ASSESSMENT: NONE - DENIES PAIN

## 2023-07-17 NOTE — ED TRIAGE NOTES
Patient arrives with [facility] with c/c of fall at home. Patient fell in bathroom this morning for unknown amount of time, possibly 2 hours. Son was present when EMS arrived on scene but was not much help. Patient complained of back pain on scene. Patient is alert but confused, according to daughter, she states to EMS that patient has confusion. , , 184/92 and 97% RA.

## 2023-07-17 NOTE — ED NOTES
AVS and Rx reviewed with patient/ daughter, understanding verbalized. Patient encouraged to follow up with PCP, patient's daughter tells nurses that her mom has an appointment at 3 pm with her PCP. Patient discharged home with no further needs or concerns voiced. Patient assisted to POV via wheelchair.      Monica Watson RN  07/17/23 [id number]

## 2023-07-17 NOTE — ED TRIAGE NOTES
Patient tells nurse she had gotten up to go to the bathroom and got a little dizzy and fell backwards. She states she does this sometimes. Unknown LOC. Patient denies pain during triage. Patient answers most questions appropriately. She is alert and orientated to name /  and where she is at. Patient follows commands without difficulty.

## 2023-07-17 NOTE — ED PROVIDER NOTES
Gem[facility] Cho [facility] [facility] ENCOUNTER      Pt Name: Roshni Bruner  MRN: [id number]  [address] 1940  Date of evaluation: 7/17/2023  Provider: Elisabeth Musa MD    CHIEF COMPLAINT       Chief Complaint   Patient presents with    Fall         HISTORY OF PRESENT ILLNESS  (Location/Symptom, Timing/Onset, Context/Setting, Quality, Duration, Modifying Factors, Severity.)   Roshni Bruner is a 79 y.o. female who presents to the emergency department complaining of a episode in which she believes that she passed out this morning patient was brought in by EMS was called apparently by her son who lives with her he apparently found her lying on the bathroom floor they think that she had been there about 2 hours she states that she got up out of bed and felt dizzy but she then said that she thought she was in the bedroom and she was asked in the bathroom when they found her so it is unclear as to what exactly happened the patient denies any dizziness or pain at this time. She is alert and oriented to person and place but not time and has trouble giving any clear history. Nursing notes were reviewed. REVIEW OFSYSTEMS    (2-9 systems for level 4, 10 or more for level 5)   ROS:  General:  No fevers, no chills, no weakness  Cardiovascular:  No chest pain, no palpitations  Respiratory:  No shortness of breath, no cough, no wheezing  Gastrointestinal:  No pain, no nausea, no vomiting, no diarrhea  Musculoskeletal:  No muscle pain, no joint pain  Skin:  No rash, no easy bruising  Neurologic:  No speech problems, no headache, no extremity weakness  Psychiatric:  No anxiety  Genitourinary:  No dysuria, no hematuria    Except as noted above the remainder of the review of systems was reviewed and negative.        PAST MEDICAL HISTORY     Past Medical History:   Diagnosis Date    Arthritis     CAD (coronary artery disease)     Chronic kidney disease     ? low functioning per pt

## 2023-07-18 ENCOUNTER — HOSPITAL ENCOUNTER (OUTPATIENT)
Facility: HOSPITAL | Age: 83
Discharge: HOME | End: 2023-07-18
Payer: MEDICARE

## 2023-07-18 LAB
25(OH)D3 SERPL-MCNC: 10.1 NG/ML (ref 32–100)
CHOLEST SERPL-MCNC: 266 MG/DL (ref 0–200)
HBA1C MFR BLD: 9.8 %
HDLC SERPL-MCNC: 35 MG/DL (ref 40–60)
LDLC SERPL CALC-MCNC: ABNORMAL MG/DL
LDLC SERPL-MCNC: 147 MG/DL
TRIGL SERPL-MCNC: 489 MG/DL (ref 0–249)
VLDLC SERPL CALC-MCNC: ABNORMAL MG/DL

## 2023-07-18 PROCEDURE — 36415 COLL VENOUS BLD VENIPUNCTURE: CPT

## 2023-07-18 PROCEDURE — 83036 HEMOGLOBIN GLYCOSYLATED A1C: CPT

## 2023-07-18 PROCEDURE — 82306 VITAMIN D 25 HYDROXY: CPT

## 2023-07-18 PROCEDURE — 80061 LIPID PANEL: CPT

## 2023-07-19 LAB
BACTERIA UR CULT: ABNORMAL
BACTERIA UR CULT: ABNORMAL
ORGANISM: ABNORMAL

## 2023-08-09 NOTE — PROGRESS NOTES
Patient: Dai Murphy  YOB: 1940    Date: 08/14/2023    Primary Care Provider: Diane Granados APRN    Chief Complaint   Patient presents with    Follow-up     carotids       History:    I saw the patient in the office today for the evaluation and treatment for carotid stenosis.  The patient did undergo a right CEA in July of 2018.  Patient denies weakness, dizziness, visual disturbances, slurred speech or memory loss.  Ultrasound last year indicated 60-79% on the right and 0% on the left.  No TIA or stroke symptoms.  Patient on aspirin daily.    The following portions of the patient's history were reviewed and updated as appropriate: allergies, current medications, past family history, past medical history, past social history, past surgical history and problem list.    Review of Systems   Constitutional:  Negative for chills, fever and unexpected weight change.   HENT:  Negative for hearing loss, trouble swallowing and voice change.    Eyes:  Negative for visual disturbance.   Respiratory:  Negative for apnea, cough, chest tightness, shortness of breath and wheezing.    Cardiovascular:  Negative for chest pain, palpitations and leg swelling.   Gastrointestinal:  Negative for abdominal distention, abdominal pain, anal bleeding, blood in stool, constipation, diarrhea, nausea, rectal pain and vomiting.   Endocrine: Negative for cold intolerance and heat intolerance.   Genitourinary:  Negative for difficulty urinating, dysuria and flank pain.   Musculoskeletal:  Negative for back pain and gait problem.   Skin:  Negative for color change, rash and wound.   Neurological:  Negative for dizziness, syncope, speech difficulty, weakness, light-headedness, numbness and headaches.   Hematological:  Negative for adenopathy. Does not bruise/bleed easily.   Psychiatric/Behavioral:  Negative for confusion. The patient is not nervous/anxious.      Vital Signs  Vitals:    08/14/23 1312   BP: 122/68   Pulse: 95  "  Temp: 95.9 øF (35.5 øC)   SpO2: 96%   Weight: 95.3 kg (210 lb)   Height: 160 cm (63\")       Allergies:  No Known Allergies    Medications:    Current Outpatient Medications:     acetaminophen (TYLENOL) 650 MG 8 hr tablet, Take 2 tablets by mouth., Disp: , Rfl:     albuterol sulfate  (90 Base) MCG/ACT inhaler, Inhale 2 puffs., Disp: , Rfl:     amLODIPine (NORVASC) 5 MG tablet, Take 1 tablet by mouth Daily., Disp: , Rfl:     aspirin 81 MG chewable tablet, Take 81 mg by mouth daily., Disp: , Rfl:     atorvastatin (LIPITOR) 80 MG tablet, Take 1 tablet by mouth Every Night., Disp: 90 tablet, Rfl: 3    Blood Glucose Monitoring Suppl (OneTouch Verio Flex System) w/Device kit, USE TO test BLOOD SUGAR, Disp: , Rfl:     calcium carbonate (OS-TONYA) 600 MG tablet, Take 1 tablet by mouth Daily., Disp: , Rfl:     ferrous sulfate 325 (65 FE) MG tablet, Take 1 tablet by mouth Daily With Breakfast., Disp: , Rfl:     fluticasone (FLONASE) 50 MCG/ACT nasal spray, 2 sprays into the nostril(s) as directed by provider Daily., Disp: , Rfl:     folic acid (FOLVITE) 1 MG tablet, Take 1 tablet by mouth Daily., Disp: , Rfl:     furosemide (LASIX) 20 MG tablet, Take 1 tablet by mouth 2 (Two) Times a Day., Disp: , Rfl:     insulin glargine (LANTUS, SEMGLEE) 100 UNIT/ML injection, Inject 75 Units under the skin into the appropriate area as directed., Disp: , Rfl:     insulin lispro (humaLOG) 100 UNIT/ML injection, Inject  under the skin into the appropriate area as directed 3 (Three) Times a Day Before Meals., Disp: , Rfl:     Lancets (OneTouch Delica Plus Cabytw86V) misc, 1 each by Other route 3 (Three) Times a Day. use to test blood sugar 3 times daily, Disp: , Rfl:     levothyroxine (SYNTHROID, LEVOTHROID) 75 MCG tablet, Take 75 mcg by mouth Daily., Disp: , Rfl:     loratadine (CLARITIN) 10 MG tablet, Take 1 tablet by mouth Daily., Disp: , Rfl:     losartan (COZAAR) 100 MG tablet, Take 100 mg by mouth daily., Disp: , Rfl:     " mometasone-formoterol (DULERA 100) 100-5 MCG/ACT inhaler, Inhale 2 puffs 2 (Two) Times a Day., Disp: , Rfl:     montelukast (SINGULAIR) 10 MG tablet, Take 10 mg by mouth nightly., Disp: , Rfl:     OneTouch Verio test strip, 1 each by Other route 3 (Three) Times a Day. use to test blood sugar 3 times daily, Disp: , Rfl:     oxyCODONE-acetaminophen (PERCOCET)  MG per tablet, Take 1 tablet by mouth Every 6 (Six) Hours As Needed for Moderate Pain., Disp: , Rfl:     raNITIdine (ZANTAC) 150 MG tablet, Take 1 tablet by mouth 2 (Two) Times a Day., Disp: , Rfl:     rOPINIRole (REQUIP) 2 MG tablet, Take 2 mg by mouth 2 times daily., Disp: , Rfl:     carvedilol (COREG) 25 MG tablet, Take 25 mg by mouth 2 times daily (with meals)., Disp: , Rfl:     doxepin (SINEquan) 100 MG capsule, Take 1 capsule by mouth., Disp: , Rfl:     fenofibrate 160 MG tablet, Take 1 tablet by mouth., Disp: , Rfl:     vitamin B-12 (CYANOCOBALAMIN) 1000 MCG tablet, Take 1,000 mcg by mouth Daily., Disp: , Rfl:     Physical Exam:   General Appearance:    Alert, cooperative, in no acute distress   Head:    Normocephalic, without obvious abnormality, atraumatic  Neck-right carotid bruit   Lungs:     Clear to auscultation,respirations regular, even and                  unlabored    Heart:    Regular rhythm and normal rate, normal S1 and S2, no            murmur, no gallop, no rub, no click   Abdomen:     Normal bowel sounds, no masses, no organomegaly, soft        non-tender, non-distended, no guarding, no rebound                tenderness   Extremities:   Moves all extremities well, no edema, no cyanosis, no             redness   Pulses:   Pulses palpable and equal bilaterally   Skin:   No bleeding, bruising or rash     Results Review:   I reviewed the patient's new clinical results.     Review of Systems was reviewed and confirmed as accurate as documented by the MA.    ASSESSMENT/PLAN:    1. Bilateral carotid artery stenosis       Stable right  carotid stenosis, still in the 57% range with no change in velocities from a year ago.  Continue aspirin daily and follow-up in 1 year for repeat ultrasound.  Patient also has new onset hoarseness and difficulty breathing at times.  Feels like she has drainage as well.  Patient encouraged to see her family physician about getting eval by pulmonology if no improvement.    Electronically signed by Nate Matthew MD  08/14/23

## 2023-08-14 ENCOUNTER — OFFICE VISIT (OUTPATIENT)
Dept: SURGERY | Facility: CLINIC | Age: 83
End: 2023-08-14
Payer: MEDICARE

## 2023-08-14 VITALS
OXYGEN SATURATION: 96 % | HEART RATE: 95 BPM | SYSTOLIC BLOOD PRESSURE: 122 MMHG | WEIGHT: 210 LBS | DIASTOLIC BLOOD PRESSURE: 68 MMHG | HEIGHT: 63 IN | BODY MASS INDEX: 37.21 KG/M2 | TEMPERATURE: 95.9 F

## 2023-08-14 DIAGNOSIS — I65.23 BILATERAL CAROTID ARTERY STENOSIS: Primary | ICD-10-CM

## 2023-08-14 RX ORDER — BLOOD-GLUCOSE METER
EACH MISCELLANEOUS
COMMUNITY
Start: 2023-05-23

## 2023-08-14 RX ORDER — BLOOD SUGAR DIAGNOSTIC
1 STRIP MISCELLANEOUS 3 TIMES DAILY
COMMUNITY
Start: 2023-06-19

## 2023-08-14 RX ORDER — DOXEPIN HYDROCHLORIDE 100 MG/1
100 CAPSULE ORAL
COMMUNITY

## 2023-08-14 RX ORDER — SENNOSIDES 8.6 MG
1300 CAPSULE ORAL
COMMUNITY

## 2023-08-14 RX ORDER — ALBUTEROL SULFATE 90 UG/1
2 AEROSOL, METERED RESPIRATORY (INHALATION)
COMMUNITY

## 2023-08-14 RX ORDER — LANCETS 30 GAUGE
1 EACH MISCELLANEOUS 3 TIMES DAILY
COMMUNITY
Start: 2023-06-19

## 2023-08-14 RX ORDER — FENOFIBRATE 160 MG/1
160 TABLET ORAL
COMMUNITY

## 2023-08-22 ENCOUNTER — HOSPITAL ENCOUNTER (OUTPATIENT)
Facility: HOSPITAL | Age: 83
Discharge: HOME | End: 2023-08-22
Payer: MEDICARE

## 2023-08-22 PROCEDURE — 80053 COMPREHEN METABOLIC PANEL: CPT

## 2023-08-22 PROCEDURE — 36415 COLL VENOUS BLD VENIPUNCTURE: CPT

## 2023-08-22 PROCEDURE — 85025 COMPLETE CBC W/AUTO DIFF WBC: CPT

## 2023-08-23 LAB
ALBUMIN SERPL-MCNC: 3.8 G/DL (ref 3.4–4.8)
ALBUMIN/GLOB SERPL: 1.2 {RATIO} (ref 0.8–2)
ALP SERPL-CCNC: 48 U/L (ref 25–100)
ALT SERPL-CCNC: 10 U/L (ref 4–36)
ANION GAP SERPL CALCULATED.3IONS-SCNC: 14 MMOL/L (ref 3–16)
AST SERPL-CCNC: 17 U/L (ref 8–33)
BASOPHILS # BLD: 0 K/UL (ref 0–0.1)
BASOPHILS NFR BLD: 0.5 %
BILIRUB SERPL-MCNC: 0.5 MG/DL (ref 0.3–1.2)
BUN SERPL-MCNC: 27 MG/DL (ref 6–20)
CALCIUM SERPL-MCNC: 9.2 MG/DL (ref 8.5–10.5)
CHLORIDE SERPL-SCNC: 101 MMOL/L (ref 98–107)
CO2 SERPL-SCNC: 22 MMOL/L (ref 20–30)
CREAT SERPL-MCNC: 1.3 MG/DL (ref 0.4–1.2)
EOSINOPHIL # BLD: 0.2 K/UL (ref 0–0.4)
EOSINOPHIL NFR BLD: 2.7 %
ERYTHROCYTE [DISTWIDTH] IN BLOOD BY AUTOMATED COUNT: 12.3 % (ref 11–16)
GFR SERPLBLD CREATININE-BSD FMLA CKD-EPI: 41 ML/MIN/{1.73_M2}
GLOBULIN SER CALC-MCNC: 3.1 G/DL
GLUCOSE SERPL-MCNC: 251 MG/DL (ref 74–106)
HCT VFR BLD AUTO: 37.4 % (ref 37–47)
HGB BLD-MCNC: 12.1 G/DL (ref 11.5–16.5)
IMM GRANULOCYTES # BLD: 0 K/UL
IMM GRANULOCYTES NFR BLD: 0.2 % (ref 0–5)
LYMPHOCYTES # BLD: 2 K/UL (ref 1.5–4)
LYMPHOCYTES NFR BLD: 31.3 %
MCH RBC QN AUTO: 31.1 PG (ref 27–32)
MCHC RBC AUTO-ENTMCNC: 32.4 G/DL (ref 31–35)
MCV RBC AUTO: 96.1 FL (ref 80–100)
MONOCYTES # BLD: 0.5 K/UL (ref 0.2–0.8)
MONOCYTES NFR BLD: 8.3 %
NEUTROPHILS # BLD: 3.6 K/UL (ref 2–7.5)
NEUTS SEG NFR BLD: 57 %
PLATELET # BLD AUTO: 276 K/UL (ref 150–400)
PMV BLD AUTO: 11.3 FL (ref 6–10)
POTASSIUM SERPL-SCNC: 4.6 MMOL/L (ref 3.4–5.1)
PROT SERPL-MCNC: 6.9 G/DL (ref 6.4–8.3)
RBC # BLD AUTO: 3.89 M/UL (ref 3.8–5.8)
SODIUM SERPL-SCNC: 137 MMOL/L (ref 136–145)
WBC # BLD AUTO: 6.2 K/UL (ref 4–11)

## 2023-12-11 ENCOUNTER — APPOINTMENT (OUTPATIENT)
Dept: CT IMAGING | Facility: HOSPITAL | Age: 83
End: 2023-12-11
Attending: EMERGENCY MEDICINE
Payer: MEDICARE

## 2023-12-11 ENCOUNTER — HOSPITAL ENCOUNTER (EMERGENCY)
Facility: HOSPITAL | Age: 83
Discharge: HOME | End: 2023-12-11
Attending: EMERGENCY MEDICINE
Payer: MEDICARE

## 2023-12-11 VITALS
WEIGHT: 190 LBS | HEIGHT: 66 IN | OXYGEN SATURATION: 95 % | HEART RATE: 106 BPM | SYSTOLIC BLOOD PRESSURE: 162 MMHG | DIASTOLIC BLOOD PRESSURE: 56 MMHG | TEMPERATURE: 97.8 F | RESPIRATION RATE: 20 BRPM | BODY MASS INDEX: 30.53 KG/M2

## 2023-12-11 DIAGNOSIS — K65.9 PERITONITIS (HCC): ICD-10-CM

## 2023-12-11 DIAGNOSIS — R31.9 URINARY TRACT INFECTION WITH HEMATURIA, SITE UNSPECIFIED: Primary | ICD-10-CM

## 2023-12-11 DIAGNOSIS — N39.0 URINARY TRACT INFECTION WITH HEMATURIA, SITE UNSPECIFIED: Primary | ICD-10-CM

## 2023-12-11 LAB
ALBUMIN SERPL-MCNC: 3.6 G/DL (ref 3.4–4.8)
ALBUMIN/GLOB SERPL: 1 {RATIO} (ref 0.8–2)
ALP SERPL-CCNC: 61 U/L (ref 25–100)
ALT SERPL-CCNC: 13 U/L (ref 4–36)
AMORPH SED URNS QL MICRO: ABNORMAL /HPF
ANION GAP SERPL CALCULATED.3IONS-SCNC: 15 MMOL/L (ref 3–16)
ANION GAP SERPL CALCULATED.3IONS-SCNC: 20 MMOL/L (ref 3–16)
AST SERPL-CCNC: 23 U/L (ref 8–33)
BACTERIA URNS QL MICRO: ABNORMAL /HPF
BASOPHILS # BLD: 0 K/UL (ref 0–0.1)
BASOPHILS NFR BLD: 0.2 %
BILIRUB SERPL-MCNC: 1 MG/DL (ref 0.3–1.2)
BILIRUB UR QL STRIP.AUTO: ABNORMAL
BUN SERPL-MCNC: 49 MG/DL (ref 6–20)
BUN SERPL-MCNC: 50 MG/DL (ref 6–20)
CALCIUM SERPL-MCNC: 8.5 MG/DL (ref 8.5–10.5)
CALCIUM SERPL-MCNC: 9.3 MG/DL (ref 8.5–10.5)
CHLORIDE SERPL-SCNC: 93 MMOL/L (ref 98–107)
CHLORIDE SERPL-SCNC: 96 MMOL/L (ref 98–107)
CLARITY UR: ABNORMAL
CO2 SERPL-SCNC: 20 MMOL/L (ref 20–30)
CO2 SERPL-SCNC: 22 MMOL/L (ref 20–30)
COLOR UR: ABNORMAL
CREAT SERPL-MCNC: 3.1 MG/DL (ref 0.4–1.2)
CREAT SERPL-MCNC: 3.1 MG/DL (ref 0.4–1.2)
EOSINOPHIL # BLD: 0.2 K/UL (ref 0–0.4)
EOSINOPHIL NFR BLD: 2.5 %
EPI CELLS #/AREA URNS HPF: ABNORMAL /HPF (ref 0–5)
ERYTHROCYTE [DISTWIDTH] IN BLOOD BY AUTOMATED COUNT: 12.6 % (ref 11–16)
GFR SERPLBLD CREATININE-BSD FMLA CKD-EPI: 14 ML/MIN/{1.73_M2}
GFR SERPLBLD CREATININE-BSD FMLA CKD-EPI: 14 ML/MIN/{1.73_M2}
GLOBULIN SER CALC-MCNC: 3.5 G/DL
GLUCOSE SERPL-MCNC: 338 MG/DL (ref 74–106)
GLUCOSE SERPL-MCNC: 341 MG/DL (ref 74–106)
GLUCOSE UR STRIP.AUTO-MCNC: 100 MG/DL
HCT VFR BLD AUTO: 37.1 % (ref 37–47)
HGB BLD-MCNC: 12 G/DL (ref 11.5–16.5)
HGB UR QL STRIP.AUTO: ABNORMAL
IMM GRANULOCYTES # BLD: 0 K/UL
IMM GRANULOCYTES NFR BLD: 0.2 % (ref 0–5)
KETONES UR STRIP.AUTO-MCNC: ABNORMAL MG/DL
LACTATE BLDV-SCNC: 4.2 MMOL/L (ref 0.4–2)
LEUKOCYTE ESTERASE UR QL STRIP.AUTO: ABNORMAL
LIPASE SERPL-CCNC: 14 U/L (ref 5.6–51.3)
LYMPHOCYTES # BLD: 4.8 K/UL (ref 1.5–4)
LYMPHOCYTES NFR BLD: 51.2 %
MCH RBC QN AUTO: 30.5 PG (ref 27–32)
MCHC RBC AUTO-ENTMCNC: 32.3 G/DL (ref 31–35)
MCV RBC AUTO: 94.2 FL (ref 80–100)
MONOCYTES # BLD: 0.4 K/UL (ref 0.2–0.8)
MONOCYTES NFR BLD: 4.2 %
MUCOUS THREADS URNS QL MICRO: ABNORMAL /LPF
NEUTROPHILS # BLD: 3.9 K/UL (ref 2–7.5)
NEUTS SEG NFR BLD: 41.7 %
NITRITE UR QL STRIP.AUTO: POSITIVE
PH BLDV: 7.22 [PH] (ref 7.35–7.45)
PH UR STRIP.AUTO: 6.5 [PH] (ref 5–8)
PLATELET # BLD AUTO: 327 K/UL (ref 150–400)
PMV BLD AUTO: 11 FL (ref 6–10)
POTASSIUM SERPL-SCNC: 3.8 MMOL/L (ref 3.4–5.1)
POTASSIUM SERPL-SCNC: 4.1 MMOL/L (ref 3.4–5.1)
PROT SERPL-MCNC: 7.1 G/DL (ref 6.4–8.3)
PROT UR STRIP.AUTO-MCNC: >=300 MG/DL
RBC # BLD AUTO: 3.94 M/UL (ref 3.8–5.8)
RBC #/AREA URNS HPF: ABNORMAL /HPF (ref 0–4)
SARS-COV-2 RDRP RESP QL NAA+PROBE: NOT DETECTED
SODIUM SERPL-SCNC: 133 MMOL/L (ref 136–145)
SODIUM SERPL-SCNC: 133 MMOL/L (ref 136–145)
SP GR UR STRIP.AUTO: 1.02 (ref 1–1.03)
UA COMPLETE W REFLEX CULTURE PNL UR: YES
UA DIPSTICK W REFLEX MICRO PNL UR: YES
URN SPEC COLLECT METH UR: ABNORMAL
UROBILINOGEN UR STRIP-ACNC: 1 E.U./DL
WBC # BLD AUTO: 9.4 K/UL (ref 4–11)
WBC #/AREA URNS HPF: >100 /HPF (ref 0–5)

## 2023-12-11 PROCEDURE — 83605 ASSAY OF LACTIC ACID: CPT

## 2023-12-11 PROCEDURE — 96365 THER/PROPH/DIAG IV INF INIT: CPT

## 2023-12-11 PROCEDURE — 6360000002 HC RX W HCPCS: Performed by: EMERGENCY MEDICINE

## 2023-12-11 PROCEDURE — 80053 COMPREHEN METABOLIC PANEL: CPT

## 2023-12-11 PROCEDURE — 36415 COLL VENOUS BLD VENIPUNCTURE: CPT

## 2023-12-11 PROCEDURE — 96375 TX/PRO/DX INJ NEW DRUG ADDON: CPT

## 2023-12-11 PROCEDURE — 82800 BLOOD PH: CPT

## 2023-12-11 PROCEDURE — 99285 EMERGENCY DEPT VISIT HI MDM: CPT

## 2023-12-11 PROCEDURE — 81001 URINALYSIS AUTO W/SCOPE: CPT

## 2023-12-11 PROCEDURE — 87086 URINE CULTURE/COLONY COUNT: CPT

## 2023-12-11 PROCEDURE — 74176 CT ABD & PELVIS W/O CONTRAST: CPT

## 2023-12-11 PROCEDURE — 87635 SARS-COV-2 COVID-19 AMP PRB: CPT

## 2023-12-11 PROCEDURE — 87040 BLOOD CULTURE FOR BACTERIA: CPT

## 2023-12-11 PROCEDURE — 83690 ASSAY OF LIPASE: CPT

## 2023-12-11 PROCEDURE — 6360000002 HC RX W HCPCS

## 2023-12-11 PROCEDURE — 2580000003 HC RX 258: Performed by: EMERGENCY MEDICINE

## 2023-12-11 PROCEDURE — 85025 COMPLETE CBC W/AUTO DIFF WBC: CPT

## 2023-12-11 RX ORDER — SODIUM CHLORIDE, SODIUM LACTATE, POTASSIUM CHLORIDE, CALCIUM CHLORIDE 600; 310; 30; 20 MG/100ML; MG/100ML; MG/100ML; MG/100ML
INJECTION, SOLUTION INTRAVENOUS CONTINUOUS
Status: DISCONTINUED | OUTPATIENT
Start: 2023-12-11 | End: 2023-12-11 | Stop reason: HOSPADM

## 2023-12-11 RX ORDER — MORPHINE SULFATE 4 MG/ML
4 INJECTION, SOLUTION INTRAMUSCULAR; INTRAVENOUS ONCE
Status: DISCONTINUED | OUTPATIENT
Start: 2023-12-11 | End: 2023-12-11 | Stop reason: HOSPADM

## 2023-12-11 RX ORDER — ONDANSETRON 2 MG/ML
INJECTION INTRAMUSCULAR; INTRAVENOUS
Status: COMPLETED
Start: 2023-12-11 | End: 2023-12-11

## 2023-12-11 RX ORDER — 0.9 % SODIUM CHLORIDE 0.9 %
1000 INTRAVENOUS SOLUTION INTRAVENOUS ONCE
Status: COMPLETED | OUTPATIENT
Start: 2023-12-11 | End: 2023-12-11

## 2023-12-11 RX ORDER — LISINOPRIL 20 MG/1
20 TABLET ORAL DAILY
COMMUNITY

## 2023-12-11 RX ORDER — ERGOCALCIFEROL 1.25 MG/1
50000 CAPSULE ORAL WEEKLY
COMMUNITY

## 2023-12-11 RX ORDER — ONDANSETRON 2 MG/ML
4 INJECTION INTRAMUSCULAR; INTRAVENOUS ONCE
Status: COMPLETED | OUTPATIENT
Start: 2023-12-11 | End: 2023-12-11

## 2023-12-11 RX ADMIN — SODIUM CHLORIDE, POTASSIUM CHLORIDE, SODIUM LACTATE AND CALCIUM CHLORIDE: 600; 310; 30; 20 INJECTION, SOLUTION INTRAVENOUS at 13:46

## 2023-12-11 RX ADMIN — ONDANSETRON 4 MG: 2 INJECTION INTRAMUSCULAR; INTRAVENOUS at 13:00

## 2023-12-11 RX ADMIN — SODIUM CHLORIDE 1000 ML: 9 INJECTION, SOLUTION INTRAVENOUS at 13:46

## 2023-12-11 RX ADMIN — MEROPENEM 1000 MG: 1 INJECTION, POWDER, FOR SOLUTION INTRAVENOUS at 14:06

## 2023-12-11 ASSESSMENT — ENCOUNTER SYMPTOMS
VOMITING: 1
EYE REDNESS: 0
COUGH: 0
BACK PAIN: 0
CONSTIPATION: 1
SHORTNESS OF BREATH: 0
EYE PAIN: 0
ABDOMINAL PAIN: 1
DIARRHEA: 0
NAUSEA: 1
RHINORRHEA: 0

## 2023-12-11 ASSESSMENT — PAIN DESCRIPTION - PAIN TYPE: TYPE: ACUTE PAIN

## 2023-12-11 ASSESSMENT — PAIN SCALES - GENERAL: PAINLEVEL_OUTOF10: 8

## 2023-12-11 ASSESSMENT — PAIN - FUNCTIONAL ASSESSMENT: PAIN_FUNCTIONAL_ASSESSMENT: 0-10

## 2023-12-11 ASSESSMENT — PAIN DESCRIPTION - FREQUENCY: FREQUENCY: CONTINUOUS

## 2023-12-11 ASSESSMENT — PAIN DESCRIPTION - LOCATION: LOCATION: ABDOMEN

## 2023-12-11 NOTE — ED NOTES
Hospitalist called back from [facility] and said they could not accept pt. That they were over capacity.  I called back to [facility] and spoke with Lux Flores and she is going to try [employer] and [employer], LIUDMILA CAMERON [facility]  12/11/23 0123

## 2023-12-11 NOTE — ED PROVIDER NOTES
Negative for rash. Neurological:  Negative for facial asymmetry and weakness. Psychiatric/Behavioral:  Negative for agitation and confusion. All other systems reviewed and are negative. Pertinent positive and negative findings as documented in the HPI  [address]    has a past medical history of Arthritis, CAD (coronary artery disease), Chronic kidney disease, Diabetes mellitus ([address] Hyperlipidemia, Hypertension, and Thyroid disease. Past Medical History:   Diagnosis Date    Arthritis     CAD (coronary artery disease)     Chronic kidney disease     ? low functioning per pt    Diabetes mellitus ([address])     Hyperlipidemia     Hypertension     Thyroid disease        SURGICALHISTORY       Past Surgical History:   Procedure Laterality Date    CARDIAC SURGERY      3 stents in  2018    CAROTID ENDARTERECTOMY Right     CAROTID ENDARTERECTOMY Right      SECTION      CHOLECYSTECTOMY      COLONOSCOPY      HYSTERECTOMY (CERVIX STATUS UNKNOWN)       CURRENT MEDICATIONS       Previous Medications    AMITRIPTYLINE (ELAVIL) 10 MG TABLET    Take 10 mg by mouth nightly    AMLODIPINE (NORVASC) 10 MG TABLET    Take 10 mg by mouth daily    ASPIRIN 81 MG CHEWABLE TABLET    Take 81 mg by mouth daily. ATORVASTATIN (LIPITOR) 80 MG TABLET    Take 80 mg by mouth daily    CARVEDILOL (COREG) 25 MG TABLET    Take 25 mg by mouth 2 times daily (with meals).     CLOPIDOGREL (PLAVIX) 75 MG TABLET    Take 75 mg by mouth daily    DOXEPIN (SINEQUAN) 100 MG CAPSULE    Take 100 mg by mouth nightly    FENOFIBRATE (TRIGLIDE) 160 MG TABLET    Take 160 mg by mouth daily    FERROUS SULFATE (IRON 325) 325 (65 FE) MG TABLET    Take 325 mg by mouth daily (with breakfast)    FOLIC ACID (FOLVITE) 1 MG TABLET    Take 1 mg by mouth daily    FUROSEMIDE (LASIX) 20 MG TABLET    Take 1 tablet by mouth daily as needed (worsening swelling or weight gain > 3 lbs) Take 20mg by mouth every day for 4-5 days and stop for lower extremity edema

## 2023-12-11 NOTE — ED NOTES
Patient given a drink of water at this time with Dr Chris gleason.      Maday Suarez, RN  12/11/23 3595

## 2023-12-11 NOTE — ED NOTES
[address] pharmacy called at this time and they will fax a list of the patients medications.       Ranjan Muniz RN  12/11/23 6084

## 2023-12-11 NOTE — ED NOTES
[facility] called back with a bed assignment at this time to [facility], phone number for report [phone].      Tiffany George RN  12/11/23 3540

## 2023-12-11 NOTE — ED NOTES
Patient with an extra large soft bowel movement at this time per adult diaper, patient cleaned up and adult diaper changed at this time, patient tolerated well and feels much improved and abdominal pain is much improved.      Sahil Zhang RN  12/11/23 [address] [facility], [address] [place] [address]  12/11/23 7645

## 2023-12-11 NOTE — ED NOTES
Patient with large soft green bowel movement at this time. Yesi care given and patient placed in an adult diaper.      Chanel Nair RN  12/11/23 6541

## 2023-12-11 NOTE — ED NOTES
Placed call to  OF [facility], Dr. Kofi Odonnell requested consult with Hospitalist at [facility] for possible transfer.         Meridee Meckel, [place]  12/11/23 8527

## 2023-12-11 NOTE — PROGRESS NOTES
Medication Reconciliation completed with fill history from [facility]. Patient stated this is her main pharmacy but she [facility] [place].   -added Vitamin D2 1.25 mg once weekly  -added Lisinopril 20 mg qd  -not taking Dulera. I could not find a fill for this medication and patient stated she cannot remember the last time she took it.   -not taking Humalog. Patient last filled this medication in January with [facility] from [address] [place] [address]. -changed Lantus from 75 units daily TO 70 qam and 25 qhs  -the following medications were marked \"not taking\" and were last filled at [facility] on 5/22/2023 for 90 day supplies: Loratadine 10 mg, Folic Acid 1 mg, Montelukast 10 mg, Vitamin B 12, Amlodipine 10 mg, and Carvedilol 25 mg bid.

## 2023-12-11 NOTE — ED NOTES
[address] [facility] called [phone] called at this time to give report, they will have to call back d/t having to assign to a nurse.      Esau Martines RN  12/11/23 9566

## 2023-12-11 NOTE — ED NOTES
Patient incontinent of another large amount of soft dark green stool at this time, patients adult diaper changed, taran care given, catheter also emptied at this time, patient feels better.      Chanel Nair RN  12/11/23 5579

## 2023-12-11 NOTE — ED NOTES
Patient updated at this time on bed and called for report and they will call me back.      Hay Wick RN  12/11/23 5765

## 2023-12-11 NOTE — ED NOTES
Patient manually disimpacted at this time for a moderate amount of hard dark colored stool, patient states that she feels much improved.      Amaya Plunkett RN  12/11/23 4057

## 2023-12-11 NOTE — ED NOTES
Patient lying on left side moaning, asked patient to lie on back at this time, patient states that her abdomen still hurts and patient made aware that can not give any pain meds until bp improves. Patient verbalized understanding.      Tere Granger RN  12/11/23 2997

## 2023-12-11 NOTE — ED NOTES
Patients daughter Kristina Mar [phone] called and update at this time.      Tere Granger RN  12/11/23 2898

## 2023-12-12 LAB — BACTERIA UR CULT: NORMAL

## 2023-12-12 NOTE — ED NOTES
Patient to [facility] via [address] [place] [address] ems at this time.      Ovi Gil, RN  12/11/23 1919

## 2023-12-12 NOTE — ED NOTES
Report given to [facility] ems at this time.      Ranjan Muniz RN  12/11/23 7188

## 2023-12-16 LAB
BACTERIA BLD CULT ORG #2: NORMAL
BACTERIA BLD CULT: NORMAL

## 2024-02-12 ENCOUNTER — APPOINTMENT (OUTPATIENT)
Dept: CT IMAGING | Facility: HOSPITAL | Age: 84
DRG: 872 | End: 2024-02-12
Attending: HOSPITALIST
Payer: MEDICARE

## 2024-02-12 ENCOUNTER — HOSPITAL ENCOUNTER (INPATIENT)
Facility: HOSPITAL | Age: 84
LOS: 1 days | Discharge: HOME | DRG: 872 | End: 2024-02-12
Attending: HOSPITALIST | Admitting: INTERNAL MEDICINE
Payer: MEDICARE

## 2024-02-12 ENCOUNTER — APPOINTMENT (OUTPATIENT)
Dept: GENERAL RADIOLOGY | Facility: HOSPITAL | Age: 84
DRG: 872 | End: 2024-02-12
Attending: HOSPITALIST
Payer: MEDICARE

## 2024-02-12 VITALS
BODY MASS INDEX: 30.53 KG/M2 | OXYGEN SATURATION: 97 % | WEIGHT: 190 LBS | DIASTOLIC BLOOD PRESSURE: 53 MMHG | HEART RATE: 82 BPM | RESPIRATION RATE: 16 BRPM | TEMPERATURE: 99.6 F | SYSTOLIC BLOOD PRESSURE: 110 MMHG | HEIGHT: 66 IN

## 2024-02-12 DIAGNOSIS — N12 PYELONEPHRITIS OF LEFT KIDNEY: ICD-10-CM

## 2024-02-12 DIAGNOSIS — N30.00 ACUTE CYSTITIS WITHOUT HEMATURIA: Primary | ICD-10-CM

## 2024-02-12 PROBLEM — A41.9 SEPSIS (HCC): Status: ACTIVE | Noted: 2024-02-12

## 2024-02-12 PROBLEM — N39.0 UTI (URINARY TRACT INFECTION): Status: ACTIVE | Noted: 2024-02-12

## 2024-02-12 PROBLEM — N18.9 ACUTE ON CHRONIC KIDNEY FAILURE (HCC): Status: ACTIVE | Noted: 2021-02-13

## 2024-02-12 PROBLEM — N17.9 ACUTE ON CHRONIC KIDNEY FAILURE (HCC): Status: ACTIVE | Noted: 2021-02-13

## 2024-02-12 PROBLEM — J06.9 UPPER RESPIRATORY TRACT INFECTION: Status: RESOLVED | Noted: 2017-04-07 | Resolved: 2024-02-12

## 2024-02-12 LAB
ALBUMIN SERPL-MCNC: 2.9 G/DL (ref 3.4–4.8)
ALBUMIN SERPL-MCNC: 3.3 G/DL (ref 3.4–4.8)
ALBUMIN/GLOB SERPL: 0.9 {RATIO} (ref 0.8–2)
ALBUMIN/GLOB SERPL: 1 {RATIO} (ref 0.8–2)
ALP SERPL-CCNC: 46 U/L (ref 25–100)
ALP SERPL-CCNC: 51 U/L (ref 25–100)
ALT SERPL-CCNC: 8 U/L (ref 4–36)
ALT SERPL-CCNC: 9 U/L (ref 4–36)
ANION GAP SERPL CALCULATED.3IONS-SCNC: 11 MMOL/L (ref 3–16)
ANION GAP SERPL CALCULATED.3IONS-SCNC: 14 MMOL/L (ref 3–16)
AST SERPL-CCNC: 17 U/L (ref 8–33)
AST SERPL-CCNC: 19 U/L (ref 8–33)
BACTERIA URNS QL MICRO: ABNORMAL /HPF
BASOPHILS # BLD: 0.1 K/UL (ref 0–0.1)
BASOPHILS # BLD: 0.1 K/UL (ref 0–0.1)
BASOPHILS NFR BLD: 0.3 %
BASOPHILS NFR BLD: 0.3 %
BILIRUB SERPL-MCNC: 0.5 MG/DL (ref 0.3–1.2)
BILIRUB SERPL-MCNC: 0.6 MG/DL (ref 0.3–1.2)
BILIRUB UR QL STRIP.AUTO: NEGATIVE
BUN SERPL-MCNC: 32 MG/DL (ref 6–20)
BUN SERPL-MCNC: 33 MG/DL (ref 6–20)
CALCIUM SERPL-MCNC: 7.9 MG/DL (ref 8.5–10.5)
CALCIUM SERPL-MCNC: 9 MG/DL (ref 8.5–10.5)
CHLORIDE SERPL-SCNC: 101 MMOL/L (ref 98–107)
CHLORIDE SERPL-SCNC: 96 MMOL/L (ref 98–107)
CLARITY UR: ABNORMAL
CO2 SERPL-SCNC: 20 MMOL/L (ref 20–30)
CO2 SERPL-SCNC: 21 MMOL/L (ref 20–30)
COLOR UR: YELLOW
CREAT SERPL-MCNC: 1.6 MG/DL (ref 0.4–1.2)
CREAT SERPL-MCNC: 1.7 MG/DL (ref 0.4–1.2)
EOSINOPHIL # BLD: 0 K/UL (ref 0–0.4)
EOSINOPHIL # BLD: 0.1 K/UL (ref 0–0.4)
EOSINOPHIL NFR BLD: 0.1 %
EOSINOPHIL NFR BLD: 0.8 %
ERYTHROCYTE [DISTWIDTH] IN BLOOD BY AUTOMATED COUNT: 12.8 % (ref 11–16)
ERYTHROCYTE [DISTWIDTH] IN BLOOD BY AUTOMATED COUNT: 12.8 % (ref 11–16)
GFR SERPLBLD CREATININE-BSD FMLA CKD-EPI: 29 ML/MIN/{1.73_M2}
GFR SERPLBLD CREATININE-BSD FMLA CKD-EPI: 32 ML/MIN/{1.73_M2}
GLOBULIN SER CALC-MCNC: 2.9 G/DL
GLOBULIN SER CALC-MCNC: 3.5 G/DL
GLUCOSE BLD-MCNC: 200 MG/DL (ref 74–106)
GLUCOSE SERPL-MCNC: 222 MG/DL (ref 74–106)
GLUCOSE SERPL-MCNC: 288 MG/DL (ref 74–106)
GLUCOSE UR STRIP.AUTO-MCNC: 250 MG/DL
HCT VFR BLD AUTO: 30.8 % (ref 37–47)
HCT VFR BLD AUTO: 34.3 % (ref 37–47)
HGB BLD-MCNC: 10 G/DL (ref 11.5–16.5)
HGB BLD-MCNC: 11.3 G/DL (ref 11.5–16.5)
HGB UR QL STRIP.AUTO: ABNORMAL
IMM GRANULOCYTES # BLD: 0.1 K/UL
IMM GRANULOCYTES # BLD: 0.1 K/UL
IMM GRANULOCYTES NFR BLD: 0.5 % (ref 0–5)
IMM GRANULOCYTES NFR BLD: 0.7 % (ref 0–5)
KETONES UR STRIP.AUTO-MCNC: ABNORMAL MG/DL
LACTATE BLDV-SCNC: 1.8 MMOL/L (ref 0.4–1.9)
LACTATE BLDV-SCNC: 1.8 MMOL/L (ref 0.4–2)
LEUKOCYTE ESTERASE UR QL STRIP.AUTO: ABNORMAL
LYMPHOCYTES # BLD: 0.8 K/UL (ref 1.5–4)
LYMPHOCYTES # BLD: 1.4 K/UL (ref 1.5–4)
LYMPHOCYTES NFR BLD: 5 %
LYMPHOCYTES NFR BLD: 7.5 %
MCH RBC QN AUTO: 30.1 PG (ref 27–32)
MCH RBC QN AUTO: 30.2 PG (ref 27–32)
MCHC RBC AUTO-ENTMCNC: 32.5 G/DL (ref 31–35)
MCHC RBC AUTO-ENTMCNC: 32.9 G/DL (ref 31–35)
MCV RBC AUTO: 91.5 FL (ref 80–100)
MCV RBC AUTO: 93.1 FL (ref 80–100)
MONOCYTES # BLD: 1.2 K/UL (ref 0.2–0.8)
MONOCYTES # BLD: 1.9 K/UL (ref 0.2–0.8)
MONOCYTES NFR BLD: 10.3 %
MONOCYTES NFR BLD: 7.5 %
NEUTROPHILS # BLD: 14.3 K/UL (ref 2–7.5)
NEUTROPHILS # BLD: 15.1 K/UL (ref 2–7.5)
NEUTS SEG NFR BLD: 81.1 %
NEUTS SEG NFR BLD: 85.9 %
NITRITE UR QL STRIP.AUTO: NEGATIVE
PERFORMED ON: ABNORMAL
PH UR STRIP.AUTO: 6 [PH] (ref 5–8)
PLATELET # BLD AUTO: 246 K/UL (ref 150–400)
PLATELET # BLD AUTO: 297 K/UL (ref 150–400)
PMV BLD AUTO: 10.1 FL (ref 6–10)
PMV BLD AUTO: 10.3 FL (ref 6–10)
POTASSIUM SERPL-SCNC: 3.9 MMOL/L (ref 3.4–5.1)
POTASSIUM SERPL-SCNC: 4.6 MMOL/L (ref 3.4–5.1)
PROT SERPL-MCNC: 5.8 G/DL (ref 6.4–8.3)
PROT SERPL-MCNC: 6.8 G/DL (ref 6.4–8.3)
PROT UR STRIP.AUTO-MCNC: >=300 MG/DL
RBC # BLD AUTO: 3.31 M/UL (ref 3.8–5.8)
RBC # BLD AUTO: 3.75 M/UL (ref 3.8–5.8)
RBC #/AREA URNS HPF: ABNORMAL /HPF (ref 0–4)
SARS-COV-2 RDRP RESP QL NAA+PROBE: NOT DETECTED
SODIUM SERPL-SCNC: 131 MMOL/L (ref 136–145)
SODIUM SERPL-SCNC: 132 MMOL/L (ref 136–145)
SP GR UR STRIP.AUTO: 1.02 (ref 1–1.03)
UA COMPLETE W REFLEX CULTURE PNL UR: YES
UA DIPSTICK W REFLEX MICRO PNL UR: YES
URN SPEC COLLECT METH UR: ABNORMAL
UROBILINOGEN UR STRIP-ACNC: 1 E.U./DL
WBC # BLD AUTO: 16.6 K/UL (ref 4–11)
WBC # BLD AUTO: 18.6 K/UL (ref 4–11)
WBC #/AREA URNS HPF: >100 /HPF (ref 0–5)

## 2024-02-12 PROCEDURE — 87040 BLOOD CULTURE FOR BACTERIA: CPT

## 2024-02-12 PROCEDURE — 2580000003 HC RX 258: Performed by: NURSE PRACTITIONER

## 2024-02-12 PROCEDURE — 83605 ASSAY OF LACTIC ACID: CPT

## 2024-02-12 PROCEDURE — 87635 SARS-COV-2 COVID-19 AMP PRB: CPT

## 2024-02-12 PROCEDURE — 81001 URINALYSIS AUTO W/SCOPE: CPT

## 2024-02-12 PROCEDURE — 85025 COMPLETE CBC W/AUTO DIFF WBC: CPT

## 2024-02-12 PROCEDURE — 51798 US URINE CAPACITY MEASURE: CPT

## 2024-02-12 PROCEDURE — 87086 URINE CULTURE/COLONY COUNT: CPT

## 2024-02-12 PROCEDURE — 87077 CULTURE AEROBIC IDENTIFY: CPT

## 2024-02-12 PROCEDURE — 96361 HYDRATE IV INFUSION ADD-ON: CPT

## 2024-02-12 PROCEDURE — 99285 EMERGENCY DEPT VISIT HI MDM: CPT

## 2024-02-12 PROCEDURE — 1200000000 HC SEMI PRIVATE

## 2024-02-12 PROCEDURE — 36415 COLL VENOUS BLD VENIPUNCTURE: CPT

## 2024-02-12 PROCEDURE — 6370000000 HC RX 637 (ALT 250 FOR IP): Performed by: NURSE PRACTITIONER

## 2024-02-12 PROCEDURE — 80053 COMPREHEN METABOLIC PANEL: CPT

## 2024-02-12 PROCEDURE — 6360000002 HC RX W HCPCS: Performed by: NURSE PRACTITIONER

## 2024-02-12 PROCEDURE — 71045 X-RAY EXAM CHEST 1 VIEW: CPT

## 2024-02-12 PROCEDURE — 2580000003 HC RX 258: Performed by: HOSPITALIST

## 2024-02-12 PROCEDURE — 87186 SC STD MICRODIL/AGAR DIL: CPT

## 2024-02-12 PROCEDURE — 96365 THER/PROPH/DIAG IV INF INIT: CPT

## 2024-02-12 PROCEDURE — 6360000002 HC RX W HCPCS: Performed by: HOSPITALIST

## 2024-02-12 PROCEDURE — 74176 CT ABD & PELVIS W/O CONTRAST: CPT

## 2024-02-12 PROCEDURE — 87150 DNA/RNA AMPLIFIED PROBE: CPT

## 2024-02-12 RX ORDER — INSULIN GLARGINE 100 [IU]/ML
15 INJECTION, SOLUTION SUBCUTANEOUS NIGHTLY
Status: DISCONTINUED | OUTPATIENT
Start: 2024-02-12 | End: 2024-02-13 | Stop reason: HOSPADM

## 2024-02-12 RX ORDER — LISINOPRIL 20 MG/1
20 TABLET ORAL DAILY
Status: DISCONTINUED | OUTPATIENT
Start: 2024-02-12 | End: 2024-02-13 | Stop reason: HOSPADM

## 2024-02-12 RX ORDER — 0.9 % SODIUM CHLORIDE 0.9 %
500 INTRAVENOUS SOLUTION INTRAVENOUS ONCE
Status: COMPLETED | OUTPATIENT
Start: 2024-02-12 | End: 2024-02-12

## 2024-02-12 RX ORDER — ONDANSETRON 2 MG/ML
4 INJECTION INTRAMUSCULAR; INTRAVENOUS EVERY 6 HOURS PRN
Status: DISCONTINUED | OUTPATIENT
Start: 2024-02-12 | End: 2024-02-13 | Stop reason: HOSPADM

## 2024-02-12 RX ORDER — SENNA AND DOCUSATE SODIUM 50; 8.6 MG/1; MG/1
1 TABLET, FILM COATED ORAL DAILY
Status: DISCONTINUED | OUTPATIENT
Start: 2024-02-12 | End: 2024-02-13 | Stop reason: HOSPADM

## 2024-02-12 RX ORDER — 0.9 % SODIUM CHLORIDE 0.9 %
1000 INTRAVENOUS SOLUTION INTRAVENOUS ONCE
Status: COMPLETED | OUTPATIENT
Start: 2024-02-12 | End: 2024-02-13

## 2024-02-12 RX ORDER — POLYETHYLENE GLYCOL 3350 17 G/17G
17 POWDER, FOR SOLUTION ORAL DAILY PRN
Status: DISCONTINUED | OUTPATIENT
Start: 2024-02-12 | End: 2024-02-13 | Stop reason: HOSPADM

## 2024-02-12 RX ORDER — ACETAMINOPHEN 325 MG/1
650 TABLET ORAL EVERY 6 HOURS PRN
Status: DISCONTINUED | OUTPATIENT
Start: 2024-02-12 | End: 2024-02-13 | Stop reason: HOSPADM

## 2024-02-12 RX ORDER — 0.9 % SODIUM CHLORIDE 0.9 %
30 INTRAVENOUS SOLUTION INTRAVENOUS ONCE
Status: COMPLETED | OUTPATIENT
Start: 2024-02-12 | End: 2024-02-12

## 2024-02-12 RX ORDER — FERROUS SULFATE 324(65)MG
325 TABLET, DELAYED RELEASE (ENTERIC COATED) ORAL
Status: DISCONTINUED | OUTPATIENT
Start: 2024-02-13 | End: 2024-02-13 | Stop reason: HOSPADM

## 2024-02-12 RX ORDER — LEVOTHYROXINE SODIUM 0.07 MG/1
75 TABLET ORAL DAILY
Status: DISCONTINUED | OUTPATIENT
Start: 2024-02-13 | End: 2024-02-13 | Stop reason: HOSPADM

## 2024-02-12 RX ORDER — ATORVASTATIN CALCIUM 80 MG/1
80 TABLET, FILM COATED ORAL DAILY
Status: DISCONTINUED | OUTPATIENT
Start: 2024-02-12 | End: 2024-02-13 | Stop reason: HOSPADM

## 2024-02-12 RX ORDER — ASPIRIN 81 MG/1
81 TABLET, CHEWABLE ORAL DAILY
Status: DISCONTINUED | OUTPATIENT
Start: 2024-02-12 | End: 2024-02-13 | Stop reason: HOSPADM

## 2024-02-12 RX ORDER — SODIUM CHLORIDE 9 MG/ML
INJECTION, SOLUTION INTRAVENOUS CONTINUOUS
Status: DISCONTINUED | OUTPATIENT
Start: 2024-02-12 | End: 2024-02-13 | Stop reason: HOSPADM

## 2024-02-12 RX ORDER — METHYLPREDNISOLONE SODIUM SUCCINATE 40 MG/ML
40 INJECTION, POWDER, LYOPHILIZED, FOR SOLUTION INTRAMUSCULAR; INTRAVENOUS ONCE
Status: COMPLETED | OUTPATIENT
Start: 2024-02-12 | End: 2024-02-12

## 2024-02-12 RX ORDER — ENOXAPARIN SODIUM 100 MG/ML
30 INJECTION SUBCUTANEOUS DAILY
Status: DISCONTINUED | OUTPATIENT
Start: 2024-02-13 | End: 2024-02-13 | Stop reason: HOSPADM

## 2024-02-12 RX ORDER — IBUPROFEN 600 MG/1
1 TABLET ORAL PRN
Status: DISCONTINUED | OUTPATIENT
Start: 2024-02-12 | End: 2024-02-13 | Stop reason: HOSPADM

## 2024-02-12 RX ORDER — DOXEPIN HYDROCHLORIDE 25 MG/1
100 CAPSULE ORAL NIGHTLY
Status: DISCONTINUED | OUTPATIENT
Start: 2024-02-12 | End: 2024-02-13 | Stop reason: HOSPADM

## 2024-02-12 RX ORDER — ACETAMINOPHEN 650 MG/1
650 SUPPOSITORY RECTAL EVERY 6 HOURS PRN
Status: DISCONTINUED | OUTPATIENT
Start: 2024-02-12 | End: 2024-02-13 | Stop reason: HOSPADM

## 2024-02-12 RX ORDER — PANTOPRAZOLE SODIUM 40 MG/1
40 TABLET, DELAYED RELEASE ORAL DAILY
Status: DISCONTINUED | OUTPATIENT
Start: 2024-02-12 | End: 2024-02-13 | Stop reason: HOSPADM

## 2024-02-12 RX ORDER — CLOPIDOGREL BISULFATE 75 MG/1
75 TABLET ORAL DAILY
Status: DISCONTINUED | OUTPATIENT
Start: 2024-02-12 | End: 2024-02-13 | Stop reason: HOSPADM

## 2024-02-12 RX ORDER — INSULIN LISPRO 100 [IU]/ML
0-4 INJECTION, SOLUTION INTRAVENOUS; SUBCUTANEOUS NIGHTLY
Status: DISCONTINUED | OUTPATIENT
Start: 2024-02-12 | End: 2024-02-13 | Stop reason: HOSPADM

## 2024-02-12 RX ORDER — SODIUM CHLORIDE 0.9 % (FLUSH) 0.9 %
5-40 SYRINGE (ML) INJECTION PRN
Status: DISCONTINUED | OUTPATIENT
Start: 2024-02-12 | End: 2024-02-13 | Stop reason: HOSPADM

## 2024-02-12 RX ORDER — AMOXICILLIN 250 MG
1 CAPSULE ORAL DAILY
COMMUNITY

## 2024-02-12 RX ORDER — INSULIN LISPRO 100 [IU]/ML
0-4 INJECTION, SOLUTION INTRAVENOUS; SUBCUTANEOUS
Status: DISCONTINUED | OUTPATIENT
Start: 2024-02-13 | End: 2024-02-13 | Stop reason: HOSPADM

## 2024-02-12 RX ORDER — AMITRIPTYLINE HYDROCHLORIDE 10 MG/1
10 TABLET, FILM COATED ORAL NIGHTLY
Status: DISCONTINUED | OUTPATIENT
Start: 2024-02-12 | End: 2024-02-13 | Stop reason: HOSPADM

## 2024-02-12 RX ORDER — FOLIC ACID 1 MG/1
1 TABLET ORAL DAILY
Status: DISCONTINUED | OUTPATIENT
Start: 2024-02-12 | End: 2024-02-13 | Stop reason: HOSPADM

## 2024-02-12 RX ORDER — ONDANSETRON 4 MG/1
4 TABLET, ORALLY DISINTEGRATING ORAL EVERY 8 HOURS PRN
Status: DISCONTINUED | OUTPATIENT
Start: 2024-02-12 | End: 2024-02-13 | Stop reason: HOSPADM

## 2024-02-12 RX ADMIN — SODIUM CHLORIDE: 9 INJECTION, SOLUTION INTRAVENOUS at 17:10

## 2024-02-12 RX ADMIN — MEROPENEM 1000 MG: 1 INJECTION, POWDER, FOR SOLUTION INTRAVENOUS at 11:53

## 2024-02-12 RX ADMIN — MEROPENEM 1000 MG: 1 INJECTION, POWDER, FOR SOLUTION INTRAVENOUS at 23:01

## 2024-02-12 RX ADMIN — ACETAMINOPHEN 650 MG: 325 TABLET, FILM COATED ORAL at 18:13

## 2024-02-12 RX ADMIN — SODIUM CHLORIDE: 9 INJECTION, SOLUTION INTRAVENOUS at 14:39

## 2024-02-12 RX ADMIN — METHYLPREDNISOLONE SODIUM SUCCINATE 40 MG: 40 INJECTION INTRAMUSCULAR; INTRAVENOUS at 22:49

## 2024-02-12 RX ADMIN — AMITRIPTYLINE HYDROCHLORIDE 10 MG: 10 TABLET, FILM COATED ORAL at 22:48

## 2024-02-12 RX ADMIN — SODIUM CHLORIDE: 9 INJECTION, SOLUTION INTRAVENOUS at 22:59

## 2024-02-12 RX ADMIN — SODIUM CHLORIDE 500 ML: 9 INJECTION, SOLUTION INTRAVENOUS at 19:53

## 2024-02-12 RX ADMIN — INSULIN GLARGINE 15 UNITS: 100 INJECTION, SOLUTION SUBCUTANEOUS at 23:12

## 2024-02-12 RX ADMIN — SODIUM CHLORIDE 2586 ML: 9 INJECTION, SOLUTION INTRAVENOUS at 12:38

## 2024-02-12 RX ADMIN — SODIUM CHLORIDE 1000 ML: 9 INJECTION, SOLUTION INTRAVENOUS at 23:02

## 2024-02-12 RX ADMIN — SODIUM CHLORIDE 1000 ML: 9 INJECTION, SOLUTION INTRAVENOUS at 11:28

## 2024-02-12 NOTE — FLOWSHEET NOTE
02/12/24 1630   Assessment   Charting Type Admission   Psychosocial   Psychosocial (WDL) WDL   Neurological   Neuro (WDL) WDL   Level of Consciousness 0   Swallow Screening   Is the patient unable to remain alert for testing? No   Is the patient on a modified diet (thickened liquids) due to pre-existing dysphagia? No   Is there presence of existing enteral tube feeding via the stomach or nose? No   Is there presence of head-of-bed restrictions (less than 30 degrees)? No   Is there presence of tracheotomy tube? No   Is the patient ordered nothing-by-mouth status? No   3 oz Water Swallow Screen Pass   [place]mond Coma Scale   Eye Opening 4   Best Verbal Response 5   Best Motor Response 6   [place] Coma Scale Score 15   HEENT (Head, Ears, Eyes, Nose, & Throat)   HEENT (WDL) X   Right Eye Glasses   Left Eye Glasses   Lips Dry   Teeth Edentulous   Respiratory   Respiratory (WDL) WDL   Gastrointestinal   Abdominal (WDL) X   Abdomen Inspection Obese   Last BM (including prior to admit) 02/10/24   RUQ Bowel Sounds Active   LUQ Bowel Sounds Active   RLQ Bowel Sounds Active   LLQ Bowel Sounds Active   Genitourinary   Genitourinary (WDL) X   Suprapubic Tenderness Yes   Dysuria (Pain/Burning w/Urination) Yes   Difficulty Urinating/Starting Stream No   Peripheral Vascular   Peripheral Vascular (WDL) WDL   Edema Right upper extremity   RUE Edema Trace   Dual Clinician Skin Assessment   Dual Skin Assessment (4 Eyes) WDL   Second Clinical  (First and Last Name) Beatriz   Skin Integumentary    Skin Integumentary (WDL) X   Skin Color Pale   Skin Condition/Temp Dry;Warm   Skin Integrity Ecchymosis;Petechiae   Location scattered   Nails X   Nail Condition Thick   Wound Prevention and Protection Methods   Location of Wound Prevention Heel   Orientation of Wound Prevention Dorsal   Dressing Present  No   Wound Offloading (Prevention Methods) Repositioning   Care Plan - Skin/Tissue Integrity Goals   Skin Integrity Remains Intact

## 2024-02-12 NOTE — ED NOTES
Patient brought in by [facility] ems for c/o dysuria, intermittent lung pain. Patient states that the dysuria has been going on for about a week.

## 2024-02-12 NOTE — PLAN OF CARE
Problem: Discharge Planning  Goal: Discharge to home or other facility with appropriate resources  Outcome: Progressing  Flowsheets (Taken 2/12/2024 7193)  Discharge to home or other facility with appropriate resources:   Identify barriers to discharge with patient and caregiver   Identify discharge learning needs (meds, wound care, etc)   Refer to discharge planning if patient needs post-hospital services based on physician order or complex needs related to functional status, cognitive ability or social support system   Arrange for needed discharge resources and transportation as appropriate     Problem: Safety - Adult  Goal: Free from fall injury  Outcome: Progressing

## 2024-02-12 NOTE — ED NOTES
Pt needed a UA. I got pt on bedside potty, pt unable to give sample at this time. Pt back in bed.

## 2024-02-12 NOTE — ED PROVIDER NOTES
work showed white count 18,600, hemoglobin 11.3, hematocrit 34.3, platelet count 297.  Comprehensive metabolic panel was benign except for sodium 131 slightly low, BUN of 32 and a creatinine 1.7.  Lactic acid normal at 1.8.    Previous creatinine and BUN back on 12/15/2023 just prior to her discharge from hospitalization at [facility] show creatinine is 0.99 BUN of 20    UA showed turbid clarity, trace ketones, moderate blood, large amount of leukoesterase.  Microscopy showed greater than 100 white cells, plus for bacteria.    Portable chest radiograph read by radiology as    CT scan abdomen pelvis without contrast read by radiology as secondary findings raising the possibility of left pyelonephritis, noting that evaluation is severely limited without contrast    Patient's radiological diagnostic studies were discussed with her and she does state her understanding.  Advised that she does have urinary tract infection.  There are findings on CT scan concerning for a possible left pyelonephritis with some nephric stranding.  However we have already started the patient on Merrem for history of ESBL .  She has some minor renal insufficiency compared to what she was previously but she did return back to baseline while at [facility] when she was transferred before.  So we gave her 30 mg/kg fluid bolus and will also continue fluid resuscitation at 100 an hour here.  Patient advised that I do believe she would need hospitalization for her symptoms specially with the increased weakness because she is at risk of fall at home.  She is agreeable to this plan.    1:33 PM EST Case discussed with Lucas Dubon and she is agreeable to admit the patient to the hospital for further evaluation workup for UTI/pyelonephritis.           CONSULTS: (Who and What was discussed)  None    Discussion with Other Profesionals : Admitting Team Lucas Dubon agreeable to admit the patient to the hospital for further evaluation

## 2024-02-12 NOTE — ED NOTES
Patient with bed bugs noted to home per ems, sticky mat placed in front of patients door at this time. No visible bugs noted at this time.

## 2024-02-12 NOTE — PROGRESS NOTES
Medication Reconciliation completed with fill history from [facility] and patient interview.   -added Stimulant Laxative 8.6-50 mg qd for constipation. Patient stated she uses this most days but not everyday.

## 2024-02-12 NOTE — PROGRESS NOTES
4 Eyes Skin Assessment     NAME:  Kailee Cotton  YOB: 1940  MEDICAL RECORD NUMBER:  [id number]    The patient is being assessed for  Admission    I agree that at least one RN has performed a thorough Head to Toe Skin Assessment on the patient. ALL assessment sites listed below have been assessed.      Areas assessed by both nurses:    Head, Face, Ears, Shoulders, Back, Chest, Arms, Elbows, Hands, Sacrum. Buttock, Coccyx, Ischium, and Legs. Feet and Heels        Does the Patient have a Wound? No noted wound(s)       Deny Prevention initiated by RN: No  Wound Care Orders initiated by RN: No    Pressure Injury (Stage 3,4, Unstageable, DTI, NWPT, and Complex wounds) if present, place Wound referral order by RN under : No    New Ostomies, if present place, Ostomy referral order under : No     Nurse 1 eSignature: Electronically signed by Agnieszka Meneses RN on 2/12/24 at 6:45 PM EST    **SHARE this note so that the co-signing nurse can place an eSignature**    Nurse 2 eSignature: {Esignature:[id number]}

## 2024-02-12 NOTE — PROGRESS NOTES
Pt chilling, axillary temp 101.0.  Tylenol given.  Also SOA and sats 89%; placed on 2lpm oxygen NC.

## 2024-02-13 LAB — REPORT: NORMAL

## 2024-02-13 NOTE — H&P
extremity edema  Qty: 60 tablet, Refills: 3      omeprazole (PRILOSEC) 40 MG delayed release capsule Take 40 mg by mouth daily      ferrous sulfate (IRON 325) 325 (65 Fe) MG tablet Take 325 mg by mouth daily (with breakfast)      amLODIPine (NORVASC) 10 MG tablet Take 10 mg by mouth daily      folic acid (FOLVITE) 1 MG tablet Take 1 mg by mouth daily      fenofibrate (TRIGLIDE) 160 MG tablet Take 160 mg by mouth daily      atorvastatin (LIPITOR) 80 MG tablet Take 80 mg by mouth daily      clopidogrel (PLAVIX) 75 MG tablet Take 75 mg by mouth daily      doxepin (SINEQUAN) 100 MG capsule Take 100 mg by mouth nightly      nitroGLYCERIN (NITROSTAT) 0.4 MG SL tablet Take with onset of chest pain, up to max of 3 total doses 5 min apart. If no relief after 3 doses, call 911 or go to the Emergency Room  Qty: 25 tablet, Refills: 0      mometasone-formoterol (DULERA) 200-5 MCG/ACT inhaler Inhale 2 puffs into the lungs 2 times daily      loratadine (CLARITIN) 10 MG tablet Take 10 mg by mouth daily      carvedilol (COREG) 25 MG tablet Take 25 mg by mouth 2 times daily (with meals).      montelukast (SINGULAIR) 10 MG tablet Take 10 mg by mouth nightly.      levothyroxine (SYNTHROID) 75 MCG tablet Take 75 mcg by mouth Daily.      aspirin 81 MG chewable tablet Take 81 mg by mouth daily.      oxyCODONE-acetaminophen (PERCOCET) 7.5-325 MG per tablet Take 1 tablet by mouth every 8 hours as needed.       Insulin Glargine (LANTUS SC) Inject 70 Units into the skin daily 70 units under the skin qam and 25 units under the skin nightly              18 minutes spent discharging this patient in chart review,  review of medications and discharge plan. Transferred prior to being seen due to acute decompensation. Discussed via telephone Dr Michelle.       Signed:  Electronically signed by GIOVANNA Ferrer CNP on 2/12/2024 at 9:30 PM       Thank you Diane Garcia APRN - CNP for the opportunity to be involved in this patient's care. If you

## 2024-02-13 NOTE — PROGRESS NOTES
2349 Patient being transfer to [facility] in [place].  Report given to Edna Robles RN.   2349 Called daughter, Shelly Sanders, to make family aware patients situation.  [facility] called for transport.

## 2024-02-13 NOTE — PROGRESS NOTES
[facility] left facility with patient at this time taking her to [facility].

## 2024-02-13 NOTE — DISCHARGE SUMMARY
daily as needed (worsening swelling or weight gain > 3 lbs) Take 20mg by mouth every day for 4-5 days and stop for lower extremity edema  Qty: 60 tablet, Refills: 3      omeprazole (PRILOSEC) 40 MG delayed release capsule Take 40 mg by mouth daily      ferrous sulfate (IRON 325) 325 (65 Fe) MG tablet Take 325 mg by mouth daily (with breakfast)      amLODIPine (NORVASC) 10 MG tablet Take 10 mg by mouth daily      folic acid (FOLVITE) 1 MG tablet Take 1 mg by mouth daily      fenofibrate (TRIGLIDE) 160 MG tablet Take 160 mg by mouth daily      atorvastatin (LIPITOR) 80 MG tablet Take 80 mg by mouth daily      clopidogrel (PLAVIX) 75 MG tablet Take 75 mg by mouth daily      doxepin (SINEQUAN) 100 MG capsule Take 100 mg by mouth nightly      nitroGLYCERIN (NITROSTAT) 0.4 MG SL tablet Take with onset of chest pain, up to max of 3 total doses 5 min apart. If no relief after 3 doses, call 911 or go to the Emergency Room  Qty: 25 tablet, Refills: 0      mometasone-formoterol (DULERA) 200-5 MCG/ACT inhaler Inhale 2 puffs into the lungs 2 times daily      loratadine (CLARITIN) 10 MG tablet Take 10 mg by mouth daily      carvedilol (COREG) 25 MG tablet Take 25 mg by mouth 2 times daily (with meals).      montelukast (SINGULAIR) 10 MG tablet Take 10 mg by mouth nightly.      levothyroxine (SYNTHROID) 75 MCG tablet Take 75 mcg by mouth Daily.      aspirin 81 MG chewable tablet Take 81 mg by mouth daily.      oxyCODONE-acetaminophen (PERCOCET) 7.5-325 MG per tablet Take 1 tablet by mouth every 8 hours as needed.       Insulin Glargine (LANTUS SC) Inject 70 Units into the skin daily 70 units under the skin qam and 25 units under the skin nightly              18 minutes spent discharging this patient in chart review,  review of medications and discharge plan. Transferred prior to being seen due to acute decompensation. Discussed via telephone Dr Michelle.       Signed:  Electronically signed by GIOVANNA Ferrer CNP on 2/12/2024

## 2024-02-14 LAB
BACTERIA UR CULT: ABNORMAL
ORGANISM: ABNORMAL

## 2024-02-15 LAB
BACTERIA BLD CULT: ABNORMAL
BACTERIA BLD CULT: ABNORMAL
ORGANISM: ABNORMAL
ORGANISM: ABNORMAL

## 2024-02-18 LAB
BACTERIA BLD CULT ORG #2: ABNORMAL
ORGANISM: ABNORMAL

## 2024-02-21 ENCOUNTER — HOSPITAL ENCOUNTER (OUTPATIENT)
Facility: HOSPITAL | Age: 84
Discharge: HOME | End: 2024-02-21
Payer: MEDICARE

## 2024-02-21 LAB
25(OH)D3 SERPL-MCNC: 29.7 NG/ML (ref 32–100)
ALBUMIN SERPL-MCNC: 3.5 G/DL (ref 3.4–4.8)
ALBUMIN/GLOB SERPL: 1.1 {RATIO} (ref 0.8–2)
ALP SERPL-CCNC: 53 U/L (ref 25–100)
ALT SERPL-CCNC: 8 U/L (ref 4–36)
ANION GAP SERPL CALCULATED.3IONS-SCNC: 13 MMOL/L (ref 3–16)
AST SERPL-CCNC: 15 U/L (ref 8–33)
BASOPHILS # BLD: 0 K/UL (ref 0–0.1)
BASOPHILS NFR BLD: 0.3 %
BILIRUB SERPL-MCNC: 0.3 MG/DL (ref 0.3–1.2)
BUN SERPL-MCNC: 21 MG/DL (ref 6–20)
CALCIUM SERPL-MCNC: 9.5 MG/DL (ref 8.5–10.5)
CHLORIDE SERPL-SCNC: 98 MMOL/L (ref 98–107)
CHOLEST SERPL-MCNC: 125 MG/DL (ref 0–200)
CO2 SERPL-SCNC: 26 MMOL/L (ref 20–30)
CREAT SERPL-MCNC: 1.2 MG/DL (ref 0.4–1.2)
EOSINOPHIL # BLD: 0.1 K/UL (ref 0–0.4)
EOSINOPHIL NFR BLD: 1.5 %
ERYTHROCYTE [DISTWIDTH] IN BLOOD BY AUTOMATED COUNT: 13.2 % (ref 11–16)
FOLATE SERPL-MCNC: 15.19 NG/ML
GFR SERPLBLD CREATININE-BSD FMLA CKD-EPI: 45 ML/MIN/{1.73_M2}
GLOBULIN SER CALC-MCNC: 3.2 G/DL
GLUCOSE SERPL-MCNC: 270 MG/DL (ref 74–106)
HBA1C MFR BLD: 9 %
HCT VFR BLD AUTO: 35.8 % (ref 37–47)
HDLC SERPL-MCNC: 34 MG/DL (ref 40–60)
HGB BLD-MCNC: 11.3 G/DL (ref 11.5–16.5)
IMM GRANULOCYTES # BLD: 0 K/UL
IMM GRANULOCYTES NFR BLD: 0.3 % (ref 0–5)
LDLC SERPL CALC-MCNC: 46 MG/DL
LYMPHOCYTES # BLD: 1.9 K/UL (ref 1.5–4)
LYMPHOCYTES NFR BLD: 31.4 %
MCH RBC QN AUTO: 29.4 PG (ref 27–32)
MCHC RBC AUTO-ENTMCNC: 31.6 G/DL (ref 31–35)
MCV RBC AUTO: 93.2 FL (ref 80–100)
MONOCYTES # BLD: 0.4 K/UL (ref 0.2–0.8)
MONOCYTES NFR BLD: 6.1 %
NEUTROPHILS # BLD: 3.6 K/UL (ref 2–7.5)
NEUTS SEG NFR BLD: 60.4 %
PLATELET # BLD AUTO: 368 K/UL (ref 150–400)
PMV BLD AUTO: 9.7 FL (ref 6–10)
POTASSIUM SERPL-SCNC: 4.8 MMOL/L (ref 3.4–5.1)
PROT SERPL-MCNC: 6.7 G/DL (ref 6.4–8.3)
RBC # BLD AUTO: 3.84 M/UL (ref 3.8–5.8)
SODIUM SERPL-SCNC: 137 MMOL/L (ref 136–145)
TRIGL SERPL-MCNC: 225 MG/DL (ref 0–249)
TSH SERPL DL<=0.005 MIU/L-ACNC: 2.49 UIU/ML (ref 0.27–4.2)
VIT B12 SERPL-MCNC: 229 PG/ML (ref 211–911)
VLDLC SERPL CALC-MCNC: 45 MG/DL
WBC # BLD AUTO: 6 K/UL (ref 4–11)

## 2024-02-21 PROCEDURE — 80053 COMPREHEN METABOLIC PANEL: CPT

## 2024-02-21 PROCEDURE — 83036 HEMOGLOBIN GLYCOSYLATED A1C: CPT

## 2024-02-21 PROCEDURE — 82746 ASSAY OF FOLIC ACID SERUM: CPT

## 2024-02-21 PROCEDURE — 85025 COMPLETE CBC W/AUTO DIFF WBC: CPT

## 2024-02-21 PROCEDURE — 82607 VITAMIN B-12: CPT

## 2024-02-21 PROCEDURE — 80061 LIPID PANEL: CPT

## 2024-02-21 PROCEDURE — 82306 VITAMIN D 25 HYDROXY: CPT

## 2024-02-21 PROCEDURE — 84443 ASSAY THYROID STIM HORMONE: CPT

## 2024-03-13 PROBLEM — N39.0 UTI (URINARY TRACT INFECTION): Status: RESOLVED | Noted: 2024-02-12 | Resolved: 2024-03-13

## 2024-04-25 ENCOUNTER — TELEPHONE (OUTPATIENT)
Dept: SURGERY | Facility: CLINIC | Age: 84
End: 2024-04-25
Payer: MEDICARE

## 2024-04-25 NOTE — TELEPHONE ENCOUNTER
Left VM for pt regarding her 1 yrs f/u with Dr. Matthew for August 2024 stating that Her current insurance is not in network with [facility]/ and to please contact her PCP for further surveillance for her carotid stenosis.

## 2024-05-08 ENCOUNTER — HOSPITAL ENCOUNTER (EMERGENCY)
Facility: HOSPITAL | Age: 84
Discharge: HOME | End: 2024-05-08
Attending: FAMILY MEDICINE
Payer: MEDICARE

## 2024-05-08 VITALS
TEMPERATURE: 98.2 F | DIASTOLIC BLOOD PRESSURE: 71 MMHG | HEART RATE: 82 BPM | WEIGHT: 189 LBS | OXYGEN SATURATION: 95 % | SYSTOLIC BLOOD PRESSURE: 154 MMHG | BODY MASS INDEX: 30.37 KG/M2 | RESPIRATION RATE: 16 BRPM | HEIGHT: 66 IN

## 2024-05-08 DIAGNOSIS — N30.00 ACUTE CYSTITIS WITHOUT HEMATURIA: Primary | ICD-10-CM

## 2024-05-08 LAB
BACTERIA URNS QL MICRO: ABNORMAL /HPF
BILIRUB UR QL STRIP.AUTO: NEGATIVE
CLARITY UR: ABNORMAL
COLOR UR: YELLOW
EPI CELLS #/AREA URNS HPF: ABNORMAL /HPF (ref 0–5)
GLUCOSE UR STRIP.AUTO-MCNC: 500 MG/DL
HGB UR QL STRIP.AUTO: ABNORMAL
KETONES UR STRIP.AUTO-MCNC: NEGATIVE MG/DL
LEUKOCYTE ESTERASE UR QL STRIP.AUTO: ABNORMAL
NITRITE UR QL STRIP.AUTO: NEGATIVE
PH UR STRIP.AUTO: 6.5 [PH] (ref 5–8)
PROT UR STRIP.AUTO-MCNC: NEGATIVE MG/DL
RBC #/AREA URNS HPF: ABNORMAL /HPF (ref 0–4)
SP GR UR STRIP.AUTO: 1.01 (ref 1–1.03)
UA COMPLETE W REFLEX CULTURE PNL UR: YES
UA DIPSTICK W REFLEX MICRO PNL UR: YES
URN SPEC COLLECT METH UR: ABNORMAL
UROBILINOGEN UR STRIP-ACNC: 0.2 E.U./DL
WBC #/AREA URNS HPF: ABNORMAL /HPF (ref 0–5)

## 2024-05-08 PROCEDURE — 6370000000 HC RX 637 (ALT 250 FOR IP): Performed by: FAMILY MEDICINE

## 2024-05-08 PROCEDURE — 6360000002 HC RX W HCPCS: Performed by: FAMILY MEDICINE

## 2024-05-08 PROCEDURE — 2500000003 HC RX 250 WO HCPCS: Performed by: FAMILY MEDICINE

## 2024-05-08 PROCEDURE — 99283 EMERGENCY DEPT VISIT LOW MDM: CPT

## 2024-05-08 PROCEDURE — 96372 THER/PROPH/DIAG INJ SC/IM: CPT

## 2024-05-08 PROCEDURE — 81001 URINALYSIS AUTO W/SCOPE: CPT

## 2024-05-08 RX ORDER — NITROFURANTOIN 25; 75 MG/1; MG/1
100 CAPSULE ORAL ONCE
Status: COMPLETED | OUTPATIENT
Start: 2024-05-08 | End: 2024-05-08

## 2024-05-08 RX ORDER — ACETAMINOPHEN 500 MG
1000 TABLET ORAL
Status: COMPLETED | OUTPATIENT
Start: 2024-05-08 | End: 2024-05-08

## 2024-05-08 RX ORDER — NITROFURANTOIN 25; 75 MG/1; MG/1
100 CAPSULE ORAL 2 TIMES DAILY
Qty: 14 CAPSULE | Refills: 0 | Status: SHIPPED | OUTPATIENT
Start: 2024-05-08 | End: 2024-05-15

## 2024-05-08 RX ADMIN — LIDOCAINE HYDROCHLORIDE 1000 MG: 10 INJECTION, SOLUTION INFILTRATION; PERINEURAL at 19:38

## 2024-05-08 RX ADMIN — NITROFURANTOIN (MONOHYDRATE/MACROCRYSTALS) 100 MG: 75; 25 CAPSULE ORAL at 19:37

## 2024-05-08 RX ADMIN — ACETAMINOPHEN 1000 MG: 500 TABLET ORAL at 19:37

## 2024-05-08 ASSESSMENT — PAIN DESCRIPTION - FREQUENCY: FREQUENCY: CONTINUOUS

## 2024-05-08 ASSESSMENT — PAIN - FUNCTIONAL ASSESSMENT: PAIN_FUNCTIONAL_ASSESSMENT: 0-10

## 2024-05-08 ASSESSMENT — PAIN DESCRIPTION - LOCATION: LOCATION: ABDOMEN

## 2024-05-08 ASSESSMENT — PAIN DESCRIPTION - ORIENTATION: ORIENTATION: LOWER

## 2024-05-08 ASSESSMENT — PAIN SCALES - GENERAL: PAINLEVEL_OUTOF10: 7

## 2024-05-08 ASSESSMENT — PAIN DESCRIPTION - PAIN TYPE: TYPE: ACUTE PAIN

## 2024-05-08 NOTE — ED PROVIDER NOTES
PAT EMERGENCY DEPARTMENT  EMERGENCY DEPARTMENT ENCOUNTER        Pt Name: Kailee Cotton  MRN: [id number]  Birthdate 1940  Date of evaluation: 2024  Provider: William Tapia MD  PCP: Diane Garcia APRN - CNP  Note Started: 7:13 PM EDT 24    CHIEF COMPLAINT       Chief Complaint   Patient presents with    Dysuria       HISTORY OF PRESENT ILLNESS: 1 or more Elements     History from : Patient and Family      Limitations to history : None    Kailee Cotton is a 83 y.o. female who presents emergency department complaining of dysuria started last night.  Patient has multiple UTIs.  Patient claims he is never seen a urologist.  Patient called her PCP told to come to emergency department for UTI.  Patient has any fevers or chills no nausea vomiting is having suprapubic pressure 7 out of 10 intensity no flank pain.  No treatments have been tried.  Patient does have chronic pain medication has not taken that today.  Patient reports normal bowel movements.    Nursing Notes were all reviewed and agreed with or any disagreements were addressed in the HPI.    REVIEW OF SYSTEMS :      Review of Systems    All systems reviewed and negative except as in HPI/MDM    SURGICAL HISTORY     Past Surgical History:   Procedure Laterality Date    CARDIAC SURGERY      3 stents in  2018    CAROTID ENDARTERECTOMY Right     CAROTID ENDARTERECTOMY Right      SECTION      CHOLECYSTECTOMY      COLONOSCOPY      HYSTERECTOMY (CERVIX STATUS UNKNOWN)         CURRENTMEDICATIONS       Previous Medications    AMITRIPTYLINE (ELAVIL) 10 MG TABLET    Take 10 mg by mouth nightly    AMLODIPINE (NORVASC) 10 MG TABLET    Take 10 mg by mouth daily    ASPIRIN 81 MG CHEWABLE TABLET    Take 81 mg by mouth daily.    ATORVASTATIN (LIPITOR) 80 MG TABLET    Take 80 mg by mouth daily    CARVEDILOL (COREG) 25 MG TABLET    Take 25 mg by mouth 2 times daily (with meals).    CLOPIDOGREL (PLAVIX) 75 MG TABLET    Take 75 mg

## 2024-05-08 NOTE — ED NOTES
Patient with c/o lower abdominal pain since yesterday. Patient states that she has been dealing with a uti for awhile off and on.

## 2024-05-09 NOTE — ED NOTES
Dc instructions given to patient at this time, patient to  rx from [facility], patient walked to restroom with her cane then out to vehicle via wheelchair at this time. Patient and family with no other questions or concerns.

## 2024-08-14 ENCOUNTER — HOSPITAL ENCOUNTER (OUTPATIENT)
Facility: HOSPITAL | Age: 84
Discharge: HOME | End: 2024-08-14
Payer: MEDICARE

## 2024-08-14 LAB
ALBUMIN SERPL-MCNC: 4 G/DL (ref 3.4–4.8)
ANION GAP SERPL CALCULATED.3IONS-SCNC: 16 MMOL/L (ref 3–16)
BACTERIA URNS QL MICRO: ABNORMAL /HPF
BILIRUB UR QL STRIP.AUTO: NEGATIVE
BUN SERPL-MCNC: 63 MG/DL (ref 6–20)
CALCIUM SERPL-MCNC: 9.3 MG/DL (ref 8.5–10.5)
CHLORIDE SERPL-SCNC: 100 MMOL/L (ref 98–107)
CLARITY UR: ABNORMAL
CO2 SERPL-SCNC: 20 MMOL/L (ref 20–30)
COLOR UR: YELLOW
CREAT SERPL-MCNC: 2.2 MG/DL (ref 0.4–1.2)
EPI CELLS #/AREA URNS HPF: ABNORMAL /HPF (ref 0–5)
GFR SERPLBLD CREATININE-BSD FMLA CKD-EPI: 21 ML/MIN/{1.73_M2}
GLUCOSE SERPL-MCNC: 247 MG/DL (ref 74–106)
GLUCOSE UR STRIP.AUTO-MCNC: >=1000 MG/DL
HGB UR QL STRIP.AUTO: ABNORMAL
KETONES UR STRIP.AUTO-MCNC: NEGATIVE MG/DL
LEUKOCYTE ESTERASE UR QL STRIP.AUTO: ABNORMAL
NITRITE UR QL STRIP.AUTO: NEGATIVE
PH UR STRIP.AUTO: 5.5 [PH] (ref 5–8)
PHOSPHATE SERPL-MCNC: 4.6 MG/DL (ref 2.5–4.5)
POTASSIUM SERPL-SCNC: 5.2 MMOL/L (ref 3.4–5.1)
PROT UR STRIP.AUTO-MCNC: NEGATIVE MG/DL
RBC #/AREA URNS HPF: ABNORMAL /HPF (ref 0–4)
SODIUM SERPL-SCNC: 136 MMOL/L (ref 136–145)
SP GR UR STRIP.AUTO: 1.01 (ref 1–1.03)
UA COMPLETE W REFLEX CULTURE PNL UR: YES
UA DIPSTICK W REFLEX MICRO PNL UR: YES
URATE SERPL-MCNC: 8.3 MG/DL (ref 2.5–7.1)
URN SPEC COLLECT METH UR: ABNORMAL
UROBILINOGEN UR STRIP-ACNC: 0.2 E.U./DL
WBC #/AREA URNS HPF: >100 /HPF (ref 0–5)

## 2024-08-14 PROCEDURE — 84550 ASSAY OF BLOOD/URIC ACID: CPT

## 2024-08-14 PROCEDURE — 36415 COLL VENOUS BLD VENIPUNCTURE: CPT

## 2024-08-14 PROCEDURE — 84156 ASSAY OF PROTEIN URINE: CPT

## 2024-08-14 PROCEDURE — 80069 RENAL FUNCTION PANEL: CPT

## 2024-08-14 PROCEDURE — 82570 ASSAY OF URINE CREATININE: CPT

## 2024-08-14 PROCEDURE — 87086 URINE CULTURE/COLONY COUNT: CPT

## 2024-08-14 PROCEDURE — 81001 URINALYSIS AUTO W/SCOPE: CPT

## 2024-08-16 LAB — BACTERIA UR CULT: NORMAL

## 2024-08-17 LAB
CREAT UR-MCNC: 60.2 MG/DL (ref 28–259)
PROT UR-MCNC: 18.7 MG/DL
PROT/CREAT UR-RTO: 0.3 MG/DL

## 2024-10-10 ENCOUNTER — HOSPITAL ENCOUNTER (OUTPATIENT)
Facility: HOSPITAL | Age: 84
Discharge: HOME | End: 2024-10-10
Payer: MEDICARE

## 2024-10-10 LAB
25(OH)D3 SERPL-MCNC: 24.1 NG/ML (ref 32–100)
ALBUMIN SERPL-MCNC: 3.8 G/DL (ref 3.4–4.8)
ALBUMIN/GLOB SERPL: 1.1 {RATIO} (ref 0.8–2)
ALP SERPL-CCNC: 53 U/L (ref 25–100)
ALT SERPL-CCNC: 20 U/L (ref 4–36)
ANION GAP SERPL CALCULATED.3IONS-SCNC: 14 MMOL/L (ref 3–16)
AST SERPL-CCNC: 24 U/L (ref 8–33)
BACTERIA URNS QL MICRO: ABNORMAL /HPF
BASOPHILS # BLD: 0 K/UL (ref 0–0.1)
BASOPHILS NFR BLD: 0.3 %
BILIRUB SERPL-MCNC: 0.4 MG/DL (ref 0.3–1.2)
BILIRUB UR QL STRIP.AUTO: NEGATIVE
BUN SERPL-MCNC: 42 MG/DL (ref 6–20)
CALCIUM SERPL-MCNC: 9.7 MG/DL (ref 8.5–10.5)
CHLORIDE SERPL-SCNC: 102 MMOL/L (ref 98–107)
CHOLEST SERPL-MCNC: 143 MG/DL (ref 0–200)
CLARITY UR: ABNORMAL
CO2 SERPL-SCNC: 25 MMOL/L (ref 20–30)
COLOR UR: YELLOW
CREAT SERPL-MCNC: 1.7 MG/DL (ref 0.4–1.2)
EOSINOPHIL # BLD: 0.2 K/UL (ref 0–0.4)
EOSINOPHIL NFR BLD: 1.7 %
ERYTHROCYTE [DISTWIDTH] IN BLOOD BY AUTOMATED COUNT: 13.3 % (ref 11–16)
FOLATE SERPL-MCNC: 16.58 NG/ML
GFR SERPLBLD CREATININE-BSD FMLA CKD-EPI: 29 ML/MIN/{1.73_M2}
GLOBULIN SER CALC-MCNC: 3.5 G/DL
GLUCOSE SERPL-MCNC: 114 MG/DL (ref 74–106)
GLUCOSE UR STRIP.AUTO-MCNC: 500 MG/DL
HCT VFR BLD AUTO: 34.7 % (ref 37–47)
HDLC SERPL-MCNC: 28 MG/DL (ref 40–60)
HGB BLD-MCNC: 11.1 G/DL (ref 11.5–16.5)
HGB UR QL STRIP.AUTO: ABNORMAL
IMM GRANULOCYTES # BLD: 0 K/UL
IMM GRANULOCYTES NFR BLD: 0.2 % (ref 0–5)
IRON SATN MFR SERPL: 24 % (ref 15–50)
IRON SERPL-MCNC: 84 UG/DL (ref 37–145)
KETONES UR STRIP.AUTO-MCNC: NEGATIVE MG/DL
LDLC SERPL CALC-MCNC: 58 MG/DL
LEUKOCYTE ESTERASE UR QL STRIP.AUTO: ABNORMAL
LYMPHOCYTES # BLD: 2.1 K/UL (ref 1.5–4)
LYMPHOCYTES NFR BLD: 23.2 %
MCH RBC QN AUTO: 30.6 PG (ref 27–32)
MCHC RBC AUTO-ENTMCNC: 32 G/DL (ref 31–35)
MCV RBC AUTO: 95.6 FL (ref 80–100)
MONOCYTES # BLD: 0.5 K/UL (ref 0.2–0.8)
MONOCYTES NFR BLD: 5.5 %
NEUTROPHILS # BLD: 6.2 K/UL (ref 2–7.5)
NEUTS SEG NFR BLD: 69.1 %
NITRITE UR QL STRIP.AUTO: NEGATIVE
PH UR STRIP.AUTO: 5.5 [PH] (ref 5–8)
PHOSPHATE SERPL-MCNC: 3.5 MG/DL (ref 2.5–4.5)
PLATELET # BLD AUTO: 310 K/UL (ref 150–400)
PMV BLD AUTO: 10 FL (ref 6–10)
POTASSIUM SERPL-SCNC: 4.5 MMOL/L (ref 3.4–5.1)
PROT SERPL-MCNC: 7.3 G/DL (ref 6.4–8.3)
PROT UR STRIP.AUTO-MCNC: NEGATIVE MG/DL
RBC # BLD AUTO: 3.63 M/UL (ref 3.8–5.8)
RBC #/AREA URNS HPF: ABNORMAL /HPF (ref 0–4)
SODIUM SERPL-SCNC: 141 MMOL/L (ref 136–145)
SP GR UR STRIP.AUTO: 1.01 (ref 1–1.03)
TIBC SERPL-MCNC: 350 UG/DL (ref 250–450)
TRIGL SERPL-MCNC: 286 MG/DL (ref 0–249)
TSH SERPL DL<=0.005 MIU/L-ACNC: 1.98 UIU/ML (ref 0.27–4.2)
UA COMPLETE W REFLEX CULTURE PNL UR: YES
UA DIPSTICK W REFLEX MICRO PNL UR: YES
URATE SERPL-MCNC: 7.5 MG/DL (ref 2.5–7.1)
URN SPEC COLLECT METH UR: ABNORMAL
UROBILINOGEN UR STRIP-ACNC: 0.2 E.U./DL
VIT B12 SERPL-MCNC: 156 PG/ML (ref 211–911)
VLDLC SERPL CALC-MCNC: 57 MG/DL
WBC # BLD AUTO: 8.9 K/UL (ref 4–11)
WBC #/AREA URNS HPF: ABNORMAL /HPF (ref 0–5)
YEAST URNS QL MICRO: PRESENT /HPF

## 2024-10-10 PROCEDURE — 82607 VITAMIN B-12: CPT

## 2024-10-10 PROCEDURE — 83970 ASSAY OF PARATHORMONE: CPT

## 2024-10-10 PROCEDURE — 82306 VITAMIN D 25 HYDROXY: CPT

## 2024-10-10 PROCEDURE — 84550 ASSAY OF BLOOD/URIC ACID: CPT

## 2024-10-10 PROCEDURE — 81001 URINALYSIS AUTO W/SCOPE: CPT

## 2024-10-10 PROCEDURE — 80061 LIPID PANEL: CPT

## 2024-10-10 PROCEDURE — 82746 ASSAY OF FOLIC ACID SERUM: CPT

## 2024-10-10 PROCEDURE — 83540 ASSAY OF IRON: CPT

## 2024-10-10 PROCEDURE — 87086 URINE CULTURE/COLONY COUNT: CPT

## 2024-10-10 PROCEDURE — 84100 ASSAY OF PHOSPHORUS: CPT

## 2024-10-10 PROCEDURE — 36415 COLL VENOUS BLD VENIPUNCTURE: CPT

## 2024-10-10 PROCEDURE — 84443 ASSAY THYROID STIM HORMONE: CPT

## 2024-10-10 PROCEDURE — 80053 COMPREHEN METABOLIC PANEL: CPT

## 2024-10-10 PROCEDURE — 85025 COMPLETE CBC W/AUTO DIFF WBC: CPT

## 2024-10-10 PROCEDURE — 83550 IRON BINDING TEST: CPT

## 2024-10-11 LAB — PTH-INTACT SERPL-MCNC: 62.8 PG/ML (ref 14–72)

## 2024-10-12 LAB — BACTERIA UR CULT: NORMAL

## 2025-02-07 ENCOUNTER — APPOINTMENT (OUTPATIENT)
Dept: GENERAL RADIOLOGY | Facility: HOSPITAL | Age: 85
End: 2025-02-07
Payer: MEDICARE

## 2025-02-07 ENCOUNTER — HOSPITAL ENCOUNTER (EMERGENCY)
Facility: HOSPITAL | Age: 85
Discharge: HOME | End: 2025-02-07
Attending: HOSPITALIST
Payer: MEDICARE

## 2025-02-07 VITALS
SYSTOLIC BLOOD PRESSURE: 130 MMHG | TEMPERATURE: 97.6 F | HEART RATE: 78 BPM | WEIGHT: 181 LBS | RESPIRATION RATE: 20 BRPM | HEIGHT: 66 IN | OXYGEN SATURATION: 98 % | DIASTOLIC BLOOD PRESSURE: 64 MMHG | BODY MASS INDEX: 29.09 KG/M2

## 2025-02-07 DIAGNOSIS — E86.0 DEHYDRATION: ICD-10-CM

## 2025-02-07 DIAGNOSIS — N17.9 ACUTE RENAL FAILURE, UNSPECIFIED ACUTE RENAL FAILURE TYPE (HCC): ICD-10-CM

## 2025-02-07 DIAGNOSIS — R94.31 NONSPECIFIC ABNORMAL ELECTROCARDIOGRAM (ECG) (EKG): Primary | ICD-10-CM

## 2025-02-07 LAB
ALBUMIN SERPL-MCNC: 3.9 G/DL (ref 3.4–4.8)
ALBUMIN/GLOB SERPL: 1 {RATIO} (ref 0.8–2)
ALP SERPL-CCNC: 68 U/L (ref 25–100)
ALT SERPL-CCNC: 24 U/L (ref 4–36)
ANION GAP SERPL CALCULATED.3IONS-SCNC: 20 MMOL/L (ref 3–16)
AST SERPL-CCNC: 42 U/L (ref 8–33)
BASOPHILS # BLD: 0.1 K/UL (ref 0–0.1)
BASOPHILS NFR BLD: 0.6 %
BILIRUB SERPL-MCNC: 0.5 MG/DL (ref 0.3–1.2)
BUN SERPL-MCNC: 77 MG/DL (ref 6–20)
CALCIUM SERPL-MCNC: 9.7 MG/DL (ref 8.5–10.5)
CHLORIDE SERPL-SCNC: 105 MMOL/L (ref 98–107)
CO2 SERPL-SCNC: 16 MMOL/L (ref 20–30)
CREAT SERPL-MCNC: 3 MG/DL (ref 0.4–1.2)
EOSINOPHIL # BLD: 0.2 K/UL (ref 0–0.4)
EOSINOPHIL NFR BLD: 2 %
ERYTHROCYTE [DISTWIDTH] IN BLOOD BY AUTOMATED COUNT: 13.4 % (ref 11–16)
GFR SERPLBLD CREATININE-BSD FMLA CKD-EPI: 15 ML/MIN/{1.73_M2}
GLOBULIN SER CALC-MCNC: 3.9 G/DL
GLUCOSE SERPL-MCNC: 208 MG/DL (ref 74–106)
HCT VFR BLD AUTO: 35.5 % (ref 37–47)
HGB BLD-MCNC: 11.5 G/DL (ref 11.5–16.5)
IMM GRANULOCYTES # BLD: 0 K/UL
IMM GRANULOCYTES NFR BLD: 0.2 % (ref 0–5)
LYMPHOCYTES # BLD: 2.2 K/UL (ref 1.5–4)
LYMPHOCYTES NFR BLD: 26.1 %
MCH RBC QN AUTO: 30.9 PG (ref 27–32)
MCHC RBC AUTO-ENTMCNC: 32.4 G/DL (ref 31–35)
MCV RBC AUTO: 95.4 FL (ref 80–100)
MONOCYTES # BLD: 0.4 K/UL (ref 0.2–0.8)
MONOCYTES NFR BLD: 5.2 %
NEUTROPHILS # BLD: 5.6 K/UL (ref 2–7.5)
NEUTS SEG NFR BLD: 65.9 %
NT-PROBNP SERPL-MCNC: 262 PG/ML (ref 0–1800)
PLATELET # BLD AUTO: 338 K/UL (ref 150–400)
PMV BLD AUTO: 10.7 FL (ref 6–10)
POTASSIUM SERPL-SCNC: 4.9 MMOL/L (ref 3.4–5.1)
PROT SERPL-MCNC: 7.8 G/DL (ref 6.4–8.3)
RBC # BLD AUTO: 3.72 M/UL (ref 3.8–5.8)
SODIUM SERPL-SCNC: 141 MMOL/L (ref 136–145)
TROPONIN, HIGH SENSITIVITY: 57 NG/L (ref 0–14)
TROPONIN, HIGH SENSITIVITY: 57 NG/L (ref 0–14)
WBC # BLD AUTO: 8.5 K/UL (ref 4–11)

## 2025-02-07 PROCEDURE — 80053 COMPREHEN METABOLIC PANEL: CPT

## 2025-02-07 PROCEDURE — 99285 EMERGENCY DEPT VISIT HI MDM: CPT

## 2025-02-07 PROCEDURE — 83880 ASSAY OF NATRIURETIC PEPTIDE: CPT

## 2025-02-07 PROCEDURE — 84484 ASSAY OF TROPONIN QUANT: CPT

## 2025-02-07 PROCEDURE — 36415 COLL VENOUS BLD VENIPUNCTURE: CPT

## 2025-02-07 PROCEDURE — 71045 X-RAY EXAM CHEST 1 VIEW: CPT

## 2025-02-07 PROCEDURE — 85025 COMPLETE CBC W/AUTO DIFF WBC: CPT

## 2025-02-07 ASSESSMENT — PAIN - FUNCTIONAL ASSESSMENT: PAIN_FUNCTIONAL_ASSESSMENT: NONE - DENIES PAIN

## 2025-02-07 NOTE — ED TRIAGE NOTES
Pt sent to ED from [facility] for EKG changes and shortness of breath. Pt states she had some chest pain last night but states she has no pain at this time.

## 2025-02-08 NOTE — ED PROVIDER NOTES
[facility]  EMERGENCY DEPARTMENT ENCOUNTER        Pt Name: Kailee Cotton  MRN: [id number]  Birthdate 1940  Date of evaluation: 2/7/2025  Provider: Sixto Matthews DO  PCP: Xi Silva APRN - NP  Note Started: 7:02 PM EST 2/7/25    CHIEF COMPLAINT       Chief Complaint   Patient presents with    Shortness of Breath    pt sent from [facility] for ekg changes       HISTORY OF PRESENT ILLNESS: 1 or more Elements     History from : Patient    Limitations to history : None    Kailee Cotton is a 84 y.o. female who presents to the emergency department for shortness of breath.  Patient was sent here from the [facility] for concerns for some EKG changes because an inverted T wave they thought she may have some ST depression.  Patient herself has never had any chest pain with the symptoms she states she had some right lower quadrant abdominal discomfort last night that lasted for short she did take a sublingual nitro she states she thinks she fell asleep with an undertone because when she awoke her abdominal discomfort was gone.  She states she is not sure how long the event lasted.  She rated as a 4 out of 10 but she cannot describe the abdominal discomfort.  She had no nausea or vomiting with it she did not have any shortness of breath with it.  No diarrhea.  She denies any trauma or injury to the abdominal area.  Patient states that occasionally she does get short of breath when she gets up and walks through the house at times but it is intermittent in nature.  She states she think she may have get a little short of breath earlier today but she does not use oxygen at home.  She denies any other complaint at this time.  No swelling of the ordinary.  Past medical history is pertinent for arthritis, coronary disease, chronic kidney disease, diabetes mellitus, hyperlipidemia, hypertension and thyroid disease    Nursing Notes were all reviewed and agreed with or

## 2025-02-08 NOTE — ED NOTES
Patient leaving AMA at this time due to wait time for labs. Patient verbalized understanding at this time with no further questions or concerns.

## 2025-03-12 ENCOUNTER — HOSPITAL ENCOUNTER (INPATIENT)
Facility: HOSPITAL | Age: 85
LOS: 3 days | Discharge: HOME | DRG: 758 | End: 2025-03-15
Attending: HOSPITALIST | Admitting: INTERNAL MEDICINE
Payer: MEDICARE

## 2025-03-12 ENCOUNTER — APPOINTMENT (OUTPATIENT)
Dept: CT IMAGING | Facility: HOSPITAL | Age: 85
DRG: 758 | End: 2025-03-12
Attending: HOSPITALIST
Payer: MEDICARE

## 2025-03-12 ENCOUNTER — APPOINTMENT (OUTPATIENT)
Dept: GENERAL RADIOLOGY | Facility: HOSPITAL | Age: 85
DRG: 758 | End: 2025-03-12
Attending: HOSPITALIST
Payer: MEDICARE

## 2025-03-12 DIAGNOSIS — Z79.4 TYPE 2 DIABETES MELLITUS WITH STAGE 3 CHRONIC KIDNEY DISEASE, WITH LONG-TERM CURRENT USE OF INSULIN, UNSPECIFIED WHETHER STAGE 3A OR 3B CKD (HCC): ICD-10-CM

## 2025-03-12 DIAGNOSIS — R11.2 NAUSEA VOMITING AND DIARRHEA: ICD-10-CM

## 2025-03-12 DIAGNOSIS — N18.30 TYPE 2 DIABETES MELLITUS WITH STAGE 3 CHRONIC KIDNEY DISEASE, WITH LONG-TERM CURRENT USE OF INSULIN, UNSPECIFIED WHETHER STAGE 3A OR 3B CKD (HCC): ICD-10-CM

## 2025-03-12 DIAGNOSIS — N17.9 ACUTE RENAL FAILURE SUPERIMPOSED ON CHRONIC KIDNEY DISEASE, UNSPECIFIED ACUTE RENAL FAILURE TYPE, UNSPECIFIED CKD STAGE: Primary | ICD-10-CM

## 2025-03-12 DIAGNOSIS — N18.9 ACUTE RENAL FAILURE SUPERIMPOSED ON CHRONIC KIDNEY DISEASE, UNSPECIFIED ACUTE RENAL FAILURE TYPE, UNSPECIFIED CKD STAGE: Primary | ICD-10-CM

## 2025-03-12 DIAGNOSIS — R19.7 NAUSEA VOMITING AND DIARRHEA: ICD-10-CM

## 2025-03-12 DIAGNOSIS — N30.00 ACUTE CYSTITIS WITHOUT HEMATURIA: ICD-10-CM

## 2025-03-12 DIAGNOSIS — E86.0 DEHYDRATION: ICD-10-CM

## 2025-03-12 DIAGNOSIS — N17.9 ACUTE RENAL FAILURE, UNSPECIFIED ACUTE RENAL FAILURE TYPE: ICD-10-CM

## 2025-03-12 DIAGNOSIS — R53.1 GENERALIZED WEAKNESS: ICD-10-CM

## 2025-03-12 DIAGNOSIS — E11.22 TYPE 2 DIABETES MELLITUS WITH STAGE 3 CHRONIC KIDNEY DISEASE, WITH LONG-TERM CURRENT USE OF INSULIN, UNSPECIFIED WHETHER STAGE 3A OR 3B CKD (HCC): ICD-10-CM

## 2025-03-12 DIAGNOSIS — E87.5 HYPERKALEMIA: ICD-10-CM

## 2025-03-12 LAB
25(OH)D3 SERPL-MCNC: 25.7 NG/ML (ref 32–100)
ALBUMIN SERPL-MCNC: 3.4 G/DL (ref 3.4–4.8)
ALBUMIN/GLOB SERPL: 1 {RATIO} (ref 0.8–2)
ALP SERPL-CCNC: 49 U/L (ref 25–100)
ALT SERPL-CCNC: 45 U/L (ref 4–36)
ANION GAP SERPL CALCULATED.3IONS-SCNC: 14 MMOL/L (ref 3–16)
AST SERPL-CCNC: 68 U/L (ref 8–33)
BACTERIA URNS QL MICRO: ABNORMAL /HPF
BASOPHILS # BLD: 0 K/UL (ref 0–0.1)
BASOPHILS NFR BLD: 0.5 %
BILIRUB SERPL-MCNC: 0.5 MG/DL (ref 0.3–1.2)
BILIRUB UR QL STRIP.AUTO: NEGATIVE
BUN SERPL-MCNC: 73 MG/DL (ref 6–20)
CALCIUM SERPL-MCNC: 9.3 MG/DL (ref 8.3–10.6)
CHLORIDE SERPL-SCNC: 101 MMOL/L (ref 98–107)
CLARITY UR: ABNORMAL
CO2 SERPL-SCNC: 19 MMOL/L (ref 20–30)
COLOR UR: YELLOW
CREAT SERPL-MCNC: 3.2 MG/DL (ref 0.4–1.2)
EOSINOPHIL # BLD: 0.3 K/UL (ref 0–0.4)
EOSINOPHIL NFR BLD: 3.5 %
EPI CELLS #/AREA URNS HPF: ABNORMAL /HPF (ref 0–5)
ERYTHROCYTE [DISTWIDTH] IN BLOOD BY AUTOMATED COUNT: 13.7 % (ref 11–16)
FLUAV AG NPH QL: NEGATIVE
FLUBV AG NPH QL: NEGATIVE
FOLATE SERPL-MCNC: 12.9 NG/ML
GFR SERPLBLD CREATININE-BSD FMLA CKD-EPI: 14 ML/MIN/{1.73_M2}
GLOBULIN SER CALC-MCNC: 3.3 G/DL
GLUCOSE BLD-MCNC: 154 MG/DL (ref 74–106)
GLUCOSE SERPL-MCNC: 261 MG/DL (ref 74–106)
GLUCOSE UR STRIP.AUTO-MCNC: 500 MG/DL
HBA1C MFR BLD: 8 %
HCT VFR BLD AUTO: 33.5 % (ref 37–47)
HGB BLD-MCNC: 10.5 G/DL (ref 11.5–16.5)
HGB UR QL STRIP.AUTO: ABNORMAL
IMM GRANULOCYTES # BLD: 0 K/UL
IMM GRANULOCYTES NFR BLD: 0.5 % (ref 0–5)
KETONES UR STRIP.AUTO-MCNC: NEGATIVE MG/DL
LACTATE BLDV-SCNC: 1.8 MMOL/L (ref 0.4–1.9)
LEUKOCYTE ESTERASE UR QL STRIP.AUTO: ABNORMAL
LIPASE SERPL-CCNC: 18 U/L (ref 5.6–51.3)
LYMPHOCYTES # BLD: 1.3 K/UL (ref 1.5–4)
LYMPHOCYTES NFR BLD: 16.5 %
MCH RBC QN AUTO: 30.9 PG (ref 27–32)
MCHC RBC AUTO-ENTMCNC: 31.3 G/DL (ref 31–35)
MCV RBC AUTO: 98.5 FL (ref 80–100)
MONOCYTES # BLD: 0.5 K/UL (ref 0.2–0.8)
MONOCYTES NFR BLD: 6.3 %
MUCOUS THREADS URNS QL MICRO: ABNORMAL /LPF
NEUTROPHILS # BLD: 5.9 K/UL (ref 2–7.5)
NEUTS SEG NFR BLD: 72.7 %
NITRITE UR QL STRIP.AUTO: NEGATIVE
NT-PROBNP SERPL-MCNC: 348 PG/ML (ref 0–1800)
PERFORMED ON: ABNORMAL
PH UR STRIP.AUTO: 5.5 [PH] (ref 5–8)
PLATELET # BLD AUTO: 349 K/UL (ref 150–400)
PMV BLD AUTO: 10.6 FL (ref 6–10)
POTASSIUM SERPL-SCNC: 5.4 MMOL/L (ref 3.4–5.1)
POTASSIUM SERPL-SCNC: 6.7 MMOL/L (ref 3.4–5.1)
PROT SERPL-MCNC: 6.7 G/DL (ref 6.4–8.3)
PROT UR STRIP.AUTO-MCNC: NEGATIVE MG/DL
RBC # BLD AUTO: 3.4 M/UL (ref 3.8–5.8)
RBC #/AREA URNS HPF: ABNORMAL /HPF (ref 0–4)
SARS-COV-2 RDRP RESP QL NAA+PROBE: NOT DETECTED
SODIUM SERPL-SCNC: 134 MMOL/L (ref 136–145)
SP GR UR STRIP.AUTO: 1.01 (ref 1–1.03)
TROPONIN, HIGH SENSITIVITY: 102 NG/L (ref 0–14)
TROPONIN, HIGH SENSITIVITY: 89 NG/L (ref 0–14)
TSH SERPL-MCNC: 0.5 UIU/ML (ref 0.27–4.2)
UA COMPLETE W REFLEX CULTURE PNL UR: YES
UA DIPSTICK W REFLEX MICRO PNL UR: YES
URN SPEC COLLECT METH UR: ABNORMAL
UROBILINOGEN UR STRIP-ACNC: 0.2 E.U./DL
VIT B12 SERPL-MCNC: 166 PG/ML (ref 211–911)
WBC # BLD AUTO: 8.1 K/UL (ref 4–11)
WBC #/AREA URNS HPF: ABNORMAL /HPF (ref 0–5)
YEAST URNS QL MICRO: PRESENT /HPF

## 2025-03-12 PROCEDURE — 2580000003 HC RX 258: Performed by: HOSPITALIST

## 2025-03-12 PROCEDURE — 84132 ASSAY OF SERUM POTASSIUM: CPT

## 2025-03-12 PROCEDURE — 84443 ASSAY THYROID STIM HORMONE: CPT

## 2025-03-12 PROCEDURE — 82746 ASSAY OF FOLIC ACID SERUM: CPT

## 2025-03-12 PROCEDURE — 80053 COMPREHEN METABOLIC PANEL: CPT

## 2025-03-12 PROCEDURE — 83036 HEMOGLOBIN GLYCOSYLATED A1C: CPT

## 2025-03-12 PROCEDURE — 83605 ASSAY OF LACTIC ACID: CPT

## 2025-03-12 PROCEDURE — 83880 ASSAY OF NATRIURETIC PEPTIDE: CPT

## 2025-03-12 PROCEDURE — 87077 CULTURE AEROBIC IDENTIFY: CPT

## 2025-03-12 PROCEDURE — 96374 THER/PROPH/DIAG INJ IV PUSH: CPT

## 2025-03-12 PROCEDURE — 71045 X-RAY EXAM CHEST 1 VIEW: CPT

## 2025-03-12 PROCEDURE — 36415 COLL VENOUS BLD VENIPUNCTURE: CPT

## 2025-03-12 PROCEDURE — 87086 URINE CULTURE/COLONY COUNT: CPT

## 2025-03-12 PROCEDURE — 82607 VITAMIN B-12: CPT

## 2025-03-12 PROCEDURE — 96361 HYDRATE IV INFUSION ADD-ON: CPT

## 2025-03-12 PROCEDURE — 84484 ASSAY OF TROPONIN QUANT: CPT

## 2025-03-12 PROCEDURE — 6370000000 HC RX 637 (ALT 250 FOR IP): Performed by: PHYSICIAN ASSISTANT

## 2025-03-12 PROCEDURE — 2580000003 HC RX 258: Performed by: PHYSICIAN ASSISTANT

## 2025-03-12 PROCEDURE — 93005 ELECTROCARDIOGRAM TRACING: CPT

## 2025-03-12 PROCEDURE — 83690 ASSAY OF LIPASE: CPT

## 2025-03-12 PROCEDURE — 6360000002 HC RX W HCPCS: Performed by: PHYSICIAN ASSISTANT

## 2025-03-12 PROCEDURE — 1200000000 HC SEMI PRIVATE

## 2025-03-12 PROCEDURE — 87186 SC STD MICRODIL/AGAR DIL: CPT

## 2025-03-12 PROCEDURE — 82306 VITAMIN D 25 HYDROXY: CPT

## 2025-03-12 PROCEDURE — 6370000000 HC RX 637 (ALT 250 FOR IP): Performed by: HOSPITALIST

## 2025-03-12 PROCEDURE — 81001 URINALYSIS AUTO W/SCOPE: CPT

## 2025-03-12 PROCEDURE — 99285 EMERGENCY DEPT VISIT HI MDM: CPT

## 2025-03-12 PROCEDURE — 6360000002 HC RX W HCPCS: Performed by: HOSPITALIST

## 2025-03-12 PROCEDURE — 85025 COMPLETE CBC W/AUTO DIFF WBC: CPT

## 2025-03-12 PROCEDURE — 51798 US URINE CAPACITY MEASURE: CPT

## 2025-03-12 PROCEDURE — 87040 BLOOD CULTURE FOR BACTERIA: CPT

## 2025-03-12 PROCEDURE — 87804 INFLUENZA ASSAY W/OPTIC: CPT

## 2025-03-12 PROCEDURE — 94761 N-INVAS EAR/PLS OXIMETRY MLT: CPT

## 2025-03-12 PROCEDURE — 87635 SARS-COV-2 COVID-19 AMP PRB: CPT

## 2025-03-12 PROCEDURE — 74176 CT ABD & PELVIS W/O CONTRAST: CPT

## 2025-03-12 RX ORDER — POLYETHYLENE GLYCOL 3350 17 G/17G
17 POWDER, FOR SOLUTION ORAL DAILY
COMMUNITY

## 2025-03-12 RX ORDER — LEVOTHYROXINE SODIUM 75 UG/1
75 TABLET ORAL DAILY
Status: DISCONTINUED | OUTPATIENT
Start: 2025-03-13 | End: 2025-03-15 | Stop reason: HOSPADM

## 2025-03-12 RX ORDER — DEXTROSE MONOHYDRATE 100 MG/ML
INJECTION, SOLUTION INTRAVENOUS CONTINUOUS PRN
Status: DISCONTINUED | OUTPATIENT
Start: 2025-03-12 | End: 2025-03-15 | Stop reason: HOSPADM

## 2025-03-12 RX ORDER — SODIUM CHLORIDE 9 MG/ML
INJECTION, SOLUTION INTRAVENOUS CONTINUOUS
Status: DISCONTINUED | OUTPATIENT
Start: 2025-03-12 | End: 2025-03-12

## 2025-03-12 RX ORDER — IBUPROFEN 600 MG/1
1 TABLET ORAL PRN
Status: DISCONTINUED | OUTPATIENT
Start: 2025-03-12 | End: 2025-03-12 | Stop reason: SDUPTHER

## 2025-03-12 RX ORDER — INSULIN GLARGINE 100 [IU]/ML
50 INJECTION, SOLUTION SUBCUTANEOUS DAILY
Status: DISCONTINUED | OUTPATIENT
Start: 2025-03-13 | End: 2025-03-14

## 2025-03-12 RX ORDER — ONDANSETRON 2 MG/ML
4 INJECTION INTRAMUSCULAR; INTRAVENOUS EVERY 6 HOURS PRN
Status: DISCONTINUED | OUTPATIENT
Start: 2025-03-12 | End: 2025-03-15 | Stop reason: HOSPADM

## 2025-03-12 RX ORDER — POLYETHYLENE GLYCOL 3350 17 G/17G
17 POWDER, FOR SOLUTION ORAL DAILY PRN
Status: DISCONTINUED | OUTPATIENT
Start: 2025-03-12 | End: 2025-03-15 | Stop reason: HOSPADM

## 2025-03-12 RX ORDER — FERROUS SULFATE 324(65)MG
325 TABLET, DELAYED RELEASE (ENTERIC COATED) ORAL
Status: DISCONTINUED | OUTPATIENT
Start: 2025-03-13 | End: 2025-03-15 | Stop reason: HOSPADM

## 2025-03-12 RX ORDER — ROPINIROLE 1 MG/1
1 TABLET, FILM COATED ORAL 3 TIMES DAILY
COMMUNITY

## 2025-03-12 RX ORDER — ASPIRIN 81 MG/1
81 TABLET, CHEWABLE ORAL DAILY
Status: DISCONTINUED | OUTPATIENT
Start: 2025-03-12 | End: 2025-03-15 | Stop reason: HOSPADM

## 2025-03-12 RX ORDER — VITAMIN B COMPLEX
2000 TABLET ORAL DAILY
Status: DISCONTINUED | OUTPATIENT
Start: 2025-03-12 | End: 2025-03-13

## 2025-03-12 RX ORDER — FLUCONAZOLE 150 MG/1
150 TABLET ORAL DAILY
Status: COMPLETED | OUTPATIENT
Start: 2025-03-12 | End: 2025-03-14

## 2025-03-12 RX ORDER — ONDANSETRON 4 MG/1
4 TABLET, ORALLY DISINTEGRATING ORAL EVERY 8 HOURS PRN
Status: DISCONTINUED | OUTPATIENT
Start: 2025-03-12 | End: 2025-03-15 | Stop reason: HOSPADM

## 2025-03-12 RX ORDER — 0.9 % SODIUM CHLORIDE 0.9 %
30 INTRAVENOUS SOLUTION INTRAVENOUS ONCE
Status: COMPLETED | OUTPATIENT
Start: 2025-03-12 | End: 2025-03-12

## 2025-03-12 RX ORDER — SODIUM CHLORIDE 9 MG/ML
INJECTION, SOLUTION INTRAVENOUS CONTINUOUS
Status: ACTIVE | OUTPATIENT
Start: 2025-03-12 | End: 2025-03-14

## 2025-03-12 RX ORDER — IBUPROFEN 600 MG/1
1 TABLET ORAL PRN
Status: DISCONTINUED | OUTPATIENT
Start: 2025-03-12 | End: 2025-03-15 | Stop reason: HOSPADM

## 2025-03-12 RX ORDER — ATORVASTATIN CALCIUM 80 MG/1
80 TABLET, FILM COATED ORAL DAILY
Status: DISCONTINUED | OUTPATIENT
Start: 2025-03-12 | End: 2025-03-15 | Stop reason: HOSPADM

## 2025-03-12 RX ORDER — ACETAMINOPHEN 650 MG/1
650 SUPPOSITORY RECTAL EVERY 6 HOURS PRN
Status: DISCONTINUED | OUTPATIENT
Start: 2025-03-12 | End: 2025-03-15 | Stop reason: HOSPADM

## 2025-03-12 RX ORDER — PANTOPRAZOLE SODIUM 40 MG/1
40 TABLET, DELAYED RELEASE ORAL
Status: DISCONTINUED | OUTPATIENT
Start: 2025-03-13 | End: 2025-03-15 | Stop reason: HOSPADM

## 2025-03-12 RX ORDER — DEXTROSE MONOHYDRATE 100 MG/ML
INJECTION, SOLUTION INTRAVENOUS CONTINUOUS PRN
Status: DISCONTINUED | OUTPATIENT
Start: 2025-03-12 | End: 2025-03-12

## 2025-03-12 RX ORDER — 0.9 % SODIUM CHLORIDE 0.9 %
30 INTRAVENOUS SOLUTION INTRAVENOUS ONCE
Status: DISCONTINUED | OUTPATIENT
Start: 2025-03-12 | End: 2025-03-12

## 2025-03-12 RX ORDER — HEPARIN SODIUM 5000 [USP'U]/ML
5000 INJECTION, SOLUTION INTRAVENOUS; SUBCUTANEOUS EVERY 8 HOURS SCHEDULED
Status: DISCONTINUED | OUTPATIENT
Start: 2025-03-12 | End: 2025-03-15 | Stop reason: HOSPADM

## 2025-03-12 RX ORDER — OXYCODONE AND ACETAMINOPHEN 7.5; 325 MG/1; MG/1
1 TABLET ORAL EVERY 8 HOURS PRN
Status: DISCONTINUED | OUTPATIENT
Start: 2025-03-12 | End: 2025-03-15 | Stop reason: HOSPADM

## 2025-03-12 RX ORDER — DOXEPIN HYDROCHLORIDE 25 MG/1
100 CAPSULE ORAL NIGHTLY
Status: DISCONTINUED | OUTPATIENT
Start: 2025-03-12 | End: 2025-03-13

## 2025-03-12 RX ORDER — ACETAMINOPHEN 325 MG/1
650 TABLET ORAL EVERY 6 HOURS PRN
Status: DISCONTINUED | OUTPATIENT
Start: 2025-03-12 | End: 2025-03-15 | Stop reason: HOSPADM

## 2025-03-12 RX ORDER — POLYETHYLENE GLYCOL 3350 17 G/17G
17 POWDER, FOR SOLUTION ORAL DAILY
Status: DISCONTINUED | OUTPATIENT
Start: 2025-03-12 | End: 2025-03-15 | Stop reason: HOSPADM

## 2025-03-12 RX ORDER — VITAMIN B COMPLEX
1 CAPSULE ORAL DAILY
COMMUNITY

## 2025-03-12 RX ORDER — ONDANSETRON 2 MG/ML
8 INJECTION INTRAMUSCULAR; INTRAVENOUS ONCE
Status: COMPLETED | OUTPATIENT
Start: 2025-03-12 | End: 2025-03-12

## 2025-03-12 RX ORDER — ROPINIROLE 1 MG/1
1 TABLET, FILM COATED ORAL 3 TIMES DAILY
Status: DISCONTINUED | OUTPATIENT
Start: 2025-03-12 | End: 2025-03-15 | Stop reason: HOSPADM

## 2025-03-12 RX ORDER — CLOPIDOGREL BISULFATE 75 MG/1
75 TABLET ORAL DAILY
Status: DISCONTINUED | OUTPATIENT
Start: 2025-03-12 | End: 2025-03-15 | Stop reason: HOSPADM

## 2025-03-12 RX ORDER — ACETAMINOPHEN 160 MG
2000 TABLET,DISINTEGRATING ORAL DAILY
Status: ON HOLD | COMMUNITY
End: 2025-03-15 | Stop reason: HOSPADM

## 2025-03-12 RX ORDER — INSULIN LISPRO 100 [IU]/ML
0-4 INJECTION, SOLUTION INTRAVENOUS; SUBCUTANEOUS
Status: DISCONTINUED | OUTPATIENT
Start: 2025-03-12 | End: 2025-03-15 | Stop reason: HOSPADM

## 2025-03-12 RX ADMIN — MEROPENEM 500 MG: 500 INJECTION INTRAVENOUS at 21:07

## 2025-03-12 RX ADMIN — CEFTRIAXONE 1000 MG: 1 INJECTION, POWDER, FOR SOLUTION INTRAMUSCULAR; INTRAVENOUS at 18:28

## 2025-03-12 RX ADMIN — DOXEPIN HYDROCHLORIDE 100 MG: 25 CAPSULE ORAL at 21:05

## 2025-03-12 RX ADMIN — ONDANSETRON 8 MG: 2 INJECTION INTRAMUSCULAR; INTRAVENOUS at 17:57

## 2025-03-12 RX ADMIN — HEPARIN SODIUM 5000 UNITS: 5000 INJECTION INTRAVENOUS; SUBCUTANEOUS at 21:08

## 2025-03-12 RX ADMIN — SODIUM POLYSTYRENE SULFONATE 30 G: 15 SUSPENSION ORAL; RECTAL at 17:32

## 2025-03-12 RX ADMIN — SODIUM CHLORIDE: 0.9 INJECTION, SOLUTION INTRAVENOUS at 18:27

## 2025-03-12 RX ADMIN — SODIUM CHLORIDE 1572 ML: 0.9 INJECTION, SOLUTION INTRAVENOUS at 16:14

## 2025-03-12 RX ADMIN — FLUCONAZOLE 150 MG: 150 TABLET ORAL at 21:05

## 2025-03-12 RX ADMIN — ROPINIROLE HYDROCHLORIDE 1 MG: 1 TABLET, FILM COATED ORAL at 21:05

## 2025-03-12 RX ADMIN — SODIUM CHLORIDE: 0.9 INJECTION, SOLUTION INTRAVENOUS at 20:17

## 2025-03-12 SDOH — ECONOMIC STABILITY: FOOD INSECURITY: WITHIN THE PAST 12 MONTHS, YOU WORRIED THAT YOUR FOOD WOULD RUN OUT BEFORE YOU GOT MONEY TO BUY MORE.: NEVER TRUE

## 2025-03-12 SDOH — ECONOMIC STABILITY: INCOME INSECURITY: HOW HARD IS IT FOR YOU TO PAY FOR THE VERY BASICS LIKE FOOD, HOUSING, MEDICAL CARE, AND HEATING?: NOT HARD AT ALL

## 2025-03-12 SDOH — ECONOMIC STABILITY: INCOME INSECURITY: IN THE PAST 12 MONTHS, HAS THE ELECTRIC, GAS, OIL, OR WATER COMPANY THREATENED TO SHUT OFF SERVICE IN YOUR HOME?: NO

## 2025-03-12 ASSESSMENT — PATIENT HEALTH QUESTIONNAIRE - PHQ9
SUM OF ALL RESPONSES TO PHQ QUESTIONS 1-9: 0
1. LITTLE INTEREST OR PLEASURE IN DOING THINGS: NOT AT ALL
SUM OF ALL RESPONSES TO PHQ QUESTIONS 1-9: 0
2. FEELING DOWN, DEPRESSED OR HOPELESS: NOT AT ALL
SUM OF ALL RESPONSES TO PHQ QUESTIONS 1-9: 0
SUM OF ALL RESPONSES TO PHQ QUESTIONS 1-9: 0

## 2025-03-12 NOTE — ED NOTES
Anirudh and her father can check with insurance and see who would cover testing within their insurance network. Below is a list of potential options.     [facility] [phone]   [facility] ([place]) [phone]    [facility] [place] [phone]   [facility] Several locations [phone]       Adult neuro psych testing: [facility] [phone] [facility] [place] [phone] [facility], [place]: [phone] [facility] in [place]: [phone] [facility]: [phone] [facility] in [place] [phone] Dr Yojana Edmondson: Located at Dr. [facility] in [place] [phone]    Requested medication list from [facility]. Will fax.

## 2025-03-12 NOTE — ED PROVIDER NOTES
skin nightly    INSULIN LISPRO, 1 UNIT DIAL, (HUMALOG KWIKPEN) 100 UNIT/ML SOPN    Use per low dose SSI    LEVOTHYROXINE (SYNTHROID) 75 MCG TABLET    Take 75 mcg by mouth Daily.    LISINOPRIL (PRINIVIL;ZESTRIL) 20 MG TABLET    Take 1 tablet by mouth daily    LORATADINE (CLARITIN) 10 MG TABLET    Take 10 mg by mouth daily    MOMETASONE-FORMOTEROL (DULERA) 200-5 MCG/ACT INHALER    Inhale 2 puffs into the lungs 2 times daily    MONTELUKAST (SINGULAIR) 10 MG TABLET    Take 10 mg by mouth nightly.    NITROGLYCERIN (NITROSTAT) 0.4 MG SL TABLET    Take with onset of chest pain, up to max of 3 total doses 5 min apart. If no relief after 3 doses, call 911 or go to the Emergency Room    OMEPRAZOLE (PRILOSEC) 40 MG DELAYED RELEASE CAPSULE    Take 40 mg by mouth daily    OXYCODONE-ACETAMINOPHEN (PERCOCET) 7.5-325 MG PER TABLET    Take 1 tablet by mouth every 8 hours as needed.    SENNA-DOCUSATE (PERICOLACE) 8.6-50 MG PER TABLET    Take 1 tablet by mouth daily    VITAMIN B-12 (CYANOCOBALAMIN) 100 MCG TABLET    Take 50 mcg by mouth daily    VITAMIN D (ERGOCALCIFEROL) 1.25 MG (35712 UT) CAPS CAPSULE    Take 1 capsule by mouth once a week       ALLERGIES     Patient has no known allergies.    FAMILYHISTORY     History reviewed. No pertinent family history.     SOCIAL HISTORY       Social History     Tobacco Use    Smoking status: Former    Smokeless tobacco: Never   Vaping Use    Vaping status: Never Used   Substance Use Topics    Alcohol use: No    Drug use: No       SCREENINGS        [place] Coma Scale  Eye Opening: Spontaneous  Best Verbal Response: Oriented  Best Motor Response: Obeys commands  [place]mond Coma Scale Score: 15                    CIWA Assessment  BP: (!) 145/64  Pulse: 80           PHYSICAL EXAM  1 or more Elements     ED Triage Vitals   BP Systolic BP Percentile Diastolic BP Percentile Temp Temp src Pulse Resp SpO2   -- -- -- -- -- -- -- --      Height Weight         -- --             My pulse oximetry  nasal congestion or drainage.  Denies cough out of the ordinary.  Patient states that she has had dark tarry stools for the past 6 to 7 years without change.  Past medical history is pertinent for arthritis, CAD, chronic kidney disease, diabetes mellitus, hyperlipidemia, hypertension and thyroid disease    Initial evaluation examination I did have conversation with the patient about upcoming plan, treatment possible disposition which they are agreeable to the times dictation.  Patient was not initially hypotensive when she got here but her blood pressure did drop to 88/46 we will going give her 30 mL/kg fluid bolus with BMI greater than 30 which comes out to 1.572 L.  Patient will have portable chest radiograph and CT scan and pelvis without contrast performed.  She will have CBC, CMP, proBNP, troponin, lipase, lactic acid sepsis protocol, blood culture x 1, UA, rapid COVID influenza swab and twelve-lead EKG.  Otherwise she is resting comfort on stretcher no acute distress nontoxic-appearing.  Final disposition be determined once her radiological diagnostic studies been performed reviewed.    Rapid COVID screen was negative    Rapid influenza screen was negative    Blood work showed white count 8100, hemoglobin is 10.5, hematocrit 33.5, platelet count is 342.  Comprehensive metabolic panel was benign except for sodium 134 slightly low, potassium is 6.7 which is elevated, CO2 of 19 which is 1 point low, glucose of 261 mildly elevated, BUN of 73 elevated, creatinine 3.2 elevated.  Alkaline phos states of 45 and AST of 68 both mildly elevated.  proBNP is normal at 348, high-sensitivity troponin elevated at 102.  Lipase is normal at 18.  Lactic acid normal at 18.    Previous troponin back on 2/7/2025 was 57 and previous to that.  Previous hemoglobin on 2/7/2025 was 11.5 previous to that on 10/10/2024 was 11.1.  Previous kidney function back on 2/24/2025 showed a creatinine of 2.39 and a BUN of 52.    UA showed cloudy

## 2025-03-12 NOTE — ED TRIAGE NOTES
Patient presents today with dizziness and shortness of breath with exertion for two weeks. She reports that the dizziness got worse today, and she had her daughter bring her here. She reports that she has had decreased appetite, nausea, vomiting, and diarrhea also. She has not vomited in two days. She denies fever, body aches, chills, and chest pain.

## 2025-03-12 NOTE — ED NOTES
Patient vomited about 15 minutes after taking Kayexalate. Dr. Matthews notified. See eMAR for orders.

## 2025-03-13 PROBLEM — E53.8 B12 DEFICIENCY: Status: ACTIVE | Noted: 2025-03-13

## 2025-03-13 PROBLEM — E55.9 VITAMIN D DEFICIENCY: Status: ACTIVE | Noted: 2025-03-13

## 2025-03-13 PROBLEM — R19.7 NAUSEA VOMITING AND DIARRHEA: Status: ACTIVE | Noted: 2025-03-13

## 2025-03-13 PROBLEM — R11.2 NAUSEA VOMITING AND DIARRHEA: Status: ACTIVE | Noted: 2025-03-13

## 2025-03-13 PROBLEM — N83.201 RIGHT OVARIAN CYST: Status: ACTIVE | Noted: 2025-03-13

## 2025-03-13 PROBLEM — R74.01 TRANSAMINITIS: Status: ACTIVE | Noted: 2025-03-13

## 2025-03-13 PROBLEM — R53.1 GENERALIZED WEAKNESS: Status: ACTIVE | Noted: 2025-03-13

## 2025-03-13 LAB
ALBUMIN SERPL-MCNC: 2.9 G/DL (ref 3.4–4.8)
ALBUMIN/GLOB SERPL: 1 {RATIO} (ref 0.8–2)
ALP SERPL-CCNC: 43 U/L (ref 25–100)
ALT SERPL-CCNC: 39 U/L (ref 4–36)
ANION GAP SERPL CALCULATED.3IONS-SCNC: 12 MMOL/L (ref 3–16)
AST SERPL-CCNC: 60 U/L (ref 8–33)
BASOPHILS # BLD: 0 K/UL (ref 0–0.1)
BASOPHILS NFR BLD: 0.3 %
BILIRUB SERPL-MCNC: 0.4 MG/DL (ref 0.3–1.2)
BUN SERPL-MCNC: 64 MG/DL (ref 6–20)
CALCIUM SERPL-MCNC: 8.8 MG/DL (ref 8.3–10.6)
CHLORIDE SERPL-SCNC: 109 MMOL/L (ref 98–107)
CO2 SERPL-SCNC: 21 MMOL/L (ref 20–30)
CREAT SERPL-MCNC: 2.8 MG/DL (ref 0.4–1.2)
EOSINOPHIL # BLD: 0.4 K/UL (ref 0–0.4)
EOSINOPHIL NFR BLD: 5 %
ERYTHROCYTE [DISTWIDTH] IN BLOOD BY AUTOMATED COUNT: 13.8 % (ref 11–16)
FERRITIN SERPL IA-MCNC: 1163 NG/ML (ref 22–322)
GFR SERPLBLD CREATININE-BSD FMLA CKD-EPI: 16 ML/MIN/{1.73_M2}
GLOBULIN SER CALC-MCNC: 2.8 G/DL
GLUCOSE BLD-MCNC: 126 MG/DL (ref 74–106)
GLUCOSE BLD-MCNC: 198 MG/DL (ref 74–106)
GLUCOSE BLD-MCNC: 260 MG/DL (ref 74–106)
GLUCOSE BLD-MCNC: 89 MG/DL (ref 74–106)
GLUCOSE SERPL-MCNC: 147 MG/DL (ref 74–106)
HCT VFR BLD AUTO: 29.2 % (ref 37–47)
HGB BLD-MCNC: 9.1 G/DL (ref 11.5–16.5)
IMM GRANULOCYTES # BLD: 0 K/UL
IMM GRANULOCYTES NFR BLD: 0.1 % (ref 0–5)
IRON SATN MFR SERPL: 41 % (ref 15–50)
IRON SERPL-MCNC: 124 UG/DL (ref 37–145)
LYMPHOCYTES # BLD: 2.1 K/UL (ref 1.5–4)
LYMPHOCYTES NFR BLD: 29.8 %
MCH RBC QN AUTO: 31 PG (ref 27–32)
MCHC RBC AUTO-ENTMCNC: 31.2 G/DL (ref 31–35)
MCV RBC AUTO: 99.3 FL (ref 80–100)
MONOCYTES # BLD: 0.6 K/UL (ref 0.2–0.8)
MONOCYTES NFR BLD: 8.1 %
NEUTROPHILS # BLD: 4 K/UL (ref 2–7.5)
NEUTS SEG NFR BLD: 56.7 %
PERFORMED ON: ABNORMAL
PERFORMED ON: NORMAL
PLATELET # BLD AUTO: 298 K/UL (ref 150–400)
PMV BLD AUTO: 10.4 FL (ref 6–10)
POTASSIUM SERPL-SCNC: 5 MMOL/L (ref 3.4–5.1)
PROT SERPL-MCNC: 5.7 G/DL (ref 6.4–8.3)
RBC # BLD AUTO: 2.94 M/UL (ref 3.8–5.8)
SODIUM SERPL-SCNC: 142 MMOL/L (ref 136–145)
TIBC SERPL-MCNC: 305 UG/DL (ref 250–450)
WBC # BLD AUTO: 7 K/UL (ref 4–11)

## 2025-03-13 PROCEDURE — 1200000000 HC SEMI PRIVATE

## 2025-03-13 PROCEDURE — 85025 COMPLETE CBC W/AUTO DIFF WBC: CPT

## 2025-03-13 PROCEDURE — 6370000000 HC RX 637 (ALT 250 FOR IP): Performed by: INTERNAL MEDICINE

## 2025-03-13 PROCEDURE — 83550 IRON BINDING TEST: CPT

## 2025-03-13 PROCEDURE — 82728 ASSAY OF FERRITIN: CPT

## 2025-03-13 PROCEDURE — 80053 COMPREHEN METABOLIC PANEL: CPT

## 2025-03-13 PROCEDURE — 6370000000 HC RX 637 (ALT 250 FOR IP): Performed by: PHYSICIAN ASSISTANT

## 2025-03-13 PROCEDURE — 36415 COLL VENOUS BLD VENIPUNCTURE: CPT

## 2025-03-13 PROCEDURE — 2580000003 HC RX 258: Performed by: PHYSICIAN ASSISTANT

## 2025-03-13 PROCEDURE — 83540 ASSAY OF IRON: CPT

## 2025-03-13 PROCEDURE — 97161 PT EVAL LOW COMPLEX 20 MIN: CPT

## 2025-03-13 PROCEDURE — 94761 N-INVAS EAR/PLS OXIMETRY MLT: CPT

## 2025-03-13 PROCEDURE — 97535 SELF CARE MNGMENT TRAINING: CPT

## 2025-03-13 PROCEDURE — 97165 OT EVAL LOW COMPLEX 30 MIN: CPT

## 2025-03-13 PROCEDURE — 99222 1ST HOSP IP/OBS MODERATE 55: CPT | Performed by: INTERNAL MEDICINE

## 2025-03-13 PROCEDURE — 6360000002 HC RX W HCPCS: Performed by: PHYSICIAN ASSISTANT

## 2025-03-13 RX ORDER — CYANOCOBALAMIN 1000 UG/ML
1000 INJECTION, SOLUTION INTRAMUSCULAR; SUBCUTANEOUS DAILY
Status: COMPLETED | OUTPATIENT
Start: 2025-03-13 | End: 2025-03-15

## 2025-03-13 RX ORDER — ERGOCALCIFEROL 1.25 MG/1
50000 CAPSULE, LIQUID FILLED ORAL WEEKLY
Status: DISCONTINUED | OUTPATIENT
Start: 2025-03-13 | End: 2025-03-15 | Stop reason: HOSPADM

## 2025-03-13 RX ORDER — DOXEPIN HYDROCHLORIDE 25 MG/1
50 CAPSULE ORAL NIGHTLY
Status: DISCONTINUED | OUTPATIENT
Start: 2025-03-13 | End: 2025-03-15 | Stop reason: HOSPADM

## 2025-03-13 RX ORDER — ALLOPURINOL 100 MG/1
0.5 TABLET ORAL DAILY
COMMUNITY
Start: 2025-02-26

## 2025-03-13 RX ADMIN — ROPINIROLE HYDROCHLORIDE 1 MG: 1 TABLET, FILM COATED ORAL at 08:02

## 2025-03-13 RX ADMIN — B-COMPLEX W/ C & FOLIC ACID TAB 1 TABLET: TAB at 08:02

## 2025-03-13 RX ADMIN — HEPARIN SODIUM 5000 UNITS: 5000 INJECTION INTRAVENOUS; SUBCUTANEOUS at 21:14

## 2025-03-13 RX ADMIN — PANTOPRAZOLE SODIUM 40 MG: 40 TABLET, DELAYED RELEASE ORAL at 06:46

## 2025-03-13 RX ADMIN — ROPINIROLE HYDROCHLORIDE 1 MG: 1 TABLET, FILM COATED ORAL at 14:29

## 2025-03-13 RX ADMIN — HEPARIN SODIUM 5000 UNITS: 5000 INJECTION INTRAVENOUS; SUBCUTANEOUS at 14:29

## 2025-03-13 RX ADMIN — DOXEPIN HYDROCHLORIDE 50 MG: 25 CAPSULE ORAL at 21:14

## 2025-03-13 RX ADMIN — MEROPENEM 500 MG: 500 INJECTION INTRAVENOUS at 08:07

## 2025-03-13 RX ADMIN — INSULIN LISPRO 2 UNITS: 100 INJECTION, SOLUTION INTRAVENOUS; SUBCUTANEOUS at 21:11

## 2025-03-13 RX ADMIN — SODIUM CHLORIDE: 0.9 INJECTION, SOLUTION INTRAVENOUS at 08:09

## 2025-03-13 RX ADMIN — OXYCODONE HYDROCHLORIDE AND ACETAMINOPHEN 1 TABLET: 7.5; 325 TABLET ORAL at 21:13

## 2025-03-13 RX ADMIN — FERROUS SULFATE TAB EC 324 MG (65 MG FE EQUIVALENT) 324 MG: 324 (65 FE) TABLET DELAYED RESPONSE at 08:02

## 2025-03-13 RX ADMIN — MEROPENEM 500 MG: 500 INJECTION INTRAVENOUS at 21:24

## 2025-03-13 RX ADMIN — CLOPIDOGREL BISULFATE 75 MG: 75 TABLET ORAL at 08:02

## 2025-03-13 RX ADMIN — HEPARIN SODIUM 5000 UNITS: 5000 INJECTION INTRAVENOUS; SUBCUTANEOUS at 06:46

## 2025-03-13 RX ADMIN — INSULIN GLARGINE 50 UNITS: 100 INJECTION, SOLUTION SUBCUTANEOUS at 08:26

## 2025-03-13 RX ADMIN — SODIUM CHLORIDE: 0.9 INJECTION, SOLUTION INTRAVENOUS at 21:11

## 2025-03-13 RX ADMIN — FLUCONAZOLE 150 MG: 150 TABLET ORAL at 21:12

## 2025-03-13 RX ADMIN — ERGOCALCIFEROL 50000 UNITS: 1.25 CAPSULE ORAL at 08:05

## 2025-03-13 RX ADMIN — INSULIN LISPRO 1 UNITS: 100 INJECTION, SOLUTION INTRAVENOUS; SUBCUTANEOUS at 10:50

## 2025-03-13 RX ADMIN — ROPINIROLE HYDROCHLORIDE 1 MG: 1 TABLET, FILM COATED ORAL at 21:13

## 2025-03-13 RX ADMIN — CYANOCOBALAMIN 1000 MCG: 1000 INJECTION, SOLUTION INTRAMUSCULAR; SUBCUTANEOUS at 08:24

## 2025-03-13 RX ADMIN — ASPIRIN 81 MG CHEWABLE TABLET 81 MG: 81 TABLET CHEWABLE at 08:02

## 2025-03-13 RX ADMIN — LEVOTHYROXINE SODIUM 75 MCG: 75 TABLET ORAL at 06:46

## 2025-03-13 ASSESSMENT — PAIN DESCRIPTION - DESCRIPTORS: DESCRIPTORS: CRAMPING;ACHING;NAGGING

## 2025-03-13 ASSESSMENT — PAIN SCALES - GENERAL: PAINLEVEL_OUTOF10: 5

## 2025-03-13 ASSESSMENT — PAIN DESCRIPTION - LOCATION: LOCATION: BACK;HIP

## 2025-03-13 ASSESSMENT — PAIN DESCRIPTION - ORIENTATION: ORIENTATION: RIGHT;LEFT

## 2025-03-13 NOTE — PROGRESS NOTES
Medication History completed with fill history from [facility], where patient receives a monthly pill organizer and [facility] in [place] and [facility] in [place] and patient interview. Patient did not seem to know what she takes or how she takes it. She states she just takes what's in her box. She did state that she gets her pain medication from [facility] and that does not go in the box.   Changed:  -Doxepin 100 mg qd TO 50 mg qd  -Aspirin 81 mg Chewable qd TO 81 mg EC tablet  -Lantus 100 mg/ml 70 u qam and 20 u qhs prn TO 70 u qam and 25 u qhs prn    Of note: Patient is not sure if she's taking both doses of Vitamin D. Allopurinol was recently filled at [facility] but was never picked up.

## 2025-03-13 NOTE — FLOWSHEET NOTE
03/13/25 0835   Assessment   Charting Type Shift assessment   Psychosocial   Psychosocial (WDL) WDL   Neurological   Level of Consciousness 0   Neuro (WDL) WDL   [place] Coma Scale   Eye Opening 4   Best Verbal Response 5   Best Motor Response 6   Desmond Coma Scale Score 15   HEENT (Head, Ears, Eyes, Nose, & Throat)   HEENT (WDL) X   Teeth Dentures lower;Dentures upper   Respiratory   Respiratory (WDL) WDL   Cardiac   Cardiac Rhythm Sinus rhythm   Cardiac (WDL) X   Cardiac Monitor   Cardiac/Telemetry Monitor On Portable telemetry pack applied   Telemetry Box Number [id number]   Alarm Audible Yes   Alarms Set Yes   Telemetry Monitor Alarm Parameters 50/120   Gastrointestinal   Abdominal (WDL) X   Abdomen Inspection Rounded   Last BM (including prior to admit) 03/13/25   RUQ Bowel Sounds Active   LUQ Bowel Sounds Active   RLQ Bowel Sounds Active   LLQ Bowel Sounds Active   Genitourinary   Genitourinary (WDL) X   Dysuria (Pain/Burning w/Urination) Yes   Peripheral Vascular   Peripheral Vascular (WDL) WDL   Edema None   Anti-Embolism   Anti-Embolism Intervention Medication   Skin Integumentary    Skin Color Pale   Skin Condition/Temp Dry;Warm   Skin Integrity Ecchymosis   Location BUE   Skin Integumentary (WDL) X   Musculoskeletal   Musculoskeletal (WDL) WDL   Care Plan - Discharge Planning Goals   Discharge to home or other facility with appropriate resources Identify barriers to discharge with patient and caregiver;Identify discharge learning needs (meds, wound care, etc)     Pt is alert and oriented.  Pt is resting in bed and appears to have no acute distress. Pt currently on room air. Pt currently on telemetry monitoring showing NSR. Pt's lung sounds are clear/diminished. Pt encouraged to cough and deep breathe. Pt call bell and bedside table within reach.

## 2025-03-13 NOTE — CARE COORDINATION
SDOH - Heparin - Lives with daughter.  Has SC, BSC, RW, cane, rollator.  No DME needs noted.  Will reassess prior to DC.       03/13/25 0985   Service Assessment   Patient Orientation Alert and Oriented   Cognition Alert   History Provided By Patient;Medical Record   Primary Caregiver Self   Support Systems Children;Family Members   Patient's Healthcare Decision Maker is: Legal Next of Kin   PCP Verified by CM Yes   Last Visit to PCP Within last 3 months   Prior Functional Level Assistance with the following:;Housework   Current Functional Level Assistance with the following:;Housework   Can patient return to prior living arrangement Yes   Ability to make needs known: Good   Family able to assist with home care needs: Yes   Would you like for me to discuss the discharge plan with any other family members/significant others, and if so, who? No   Financial Resources Medicare;Medicaid   Community Resources None   CM/DAMARI Referral Other (see comment)   Social/Functional History   Lives With Daughter   Type of Home House   Home Layout Performs ADL's on one level;Able to Live on Main level with bedroom/bathroom;Two level   Home Access Stairs to enter with rails   Entrance Stairs - Number of Steps 2 ARIANA   Bathroom Shower/Tub Tub/Shower unit   Bathroom Toilet Standard   Bathroom Equipment Shower chair;3-in-1 Commode   Bathroom Accessibility Walker accessible   Home Equipment Walker - Rolling;Cane;Rollator   Receives Help From Family;Other (Comment)  (House keeper on Tuesdays)   Prior Level of Assist for ADLs Independent   Prior Level of Assist for Homemaking Needs assistance  (Has  and children assist with housekeeping tasks. Patient able to complete basic meal prep)   Homemaking Responsibilities Yes   Prior Level of Assist for Transfers Independent   Active  No   Patient's  Info Daughter   Mode of Transportation Car   Discharge Planning   Type of Residence House   Living Arrangements  Children  (daughter)   Current Services Prior To Admission Durable Medical Equipment   Current DME Prior to Arrival Shower Chair;Bedside Commode;Walker;Cane  (SC, BSC, RW, cane, rollator)   Potential Assistance Needed N/A   DME Ordered? No   Potential Assistance Purchasing Medications No   Type of Home Care Services None   Patient expects to be discharged to: House   Follow Up Appointment: Best Day/Time  Tuesday PM  (any)   One/Two Story Residence Two story, live on first floor   History of falls? 0

## 2025-03-13 NOTE — PLAN OF CARE
Problem: Chronic Conditions and Co-morbidities  Goal: Patient's chronic conditions and co-morbidity symptoms are monitored and maintained or improved  3/13/2025 0839 by Micheline Grullon RN  Outcome: Progressing  3/12/2025 2032 by Jared Ram RN  Outcome: Progressing     Problem: Discharge Planning  Goal: Discharge to home or other facility with appropriate resources  3/13/2025 0839 by Micheline Grullon RN  Outcome: Progressing  Flowsheets (Taken 3/13/2025 0835)  Discharge to home or other facility with appropriate resources:   Identify barriers to discharge with patient and caregiver   Identify discharge learning needs (meds, wound care, etc)  3/12/2025 2032 by Jared Ram RN  Outcome: Progressing     Problem: Safety - Adult  Goal: Free from fall injury  3/13/2025 0839 by Micheline Grullon RN  Outcome: Progressing  3/12/2025 2032 by Jared Ram RN  Outcome: Progressing     Problem: ABCDS Injury Assessment  Goal: Absence of physical injury  3/13/2025 0839 by Micheline Grullon RN  Outcome: Progressing  3/12/2025 2032 by Jared Ram RN  Outcome: Progressing

## 2025-03-13 NOTE — FLOWSHEET NOTE
03/13/25 0710   Vital Signs   Temp 97.7 °F (36.5 °C)   Temp Source Oral   Pulse 63   Heart Rate Source Monitor   Respirations 16   /73   MAP (Calculated) 84   MAP (mmHg) 77   BP Location Right upper arm   BP Method Automatic  (.)   Patient Position Supine   Oxygen Therapy   SpO2 97 %   O2 Device None (Room air)

## 2025-03-13 NOTE — H&P
History and Physical    Patient:  Kailee Cotton    CHIEF COMPLAINT:    Weakness, n/v/d    HISTORY OF PRESENT ILLNESS:   The patient is a 84 y.o. female with PMH of CKD 4, HTN, DM II, hypothyroidism, CAD, and others who presents from home due to generalized weakness, nausea, vomiting, diarrhea. Patient states she's been sick for almost 2 weeks. She continued to take her medications as prescribed. She became extremely weak. She did not want to come to the hospital, but her daughter Shelly insisted she come and brought her to the ED. Upon arrival to the emergency department her temp is 99.1, /51, RR 16, HR 85. Na 124, K 6.7, bicarb 19, glucose 261, BUN 73, Cr 3.2, ALT 45, AST 68. Hgb 10.5. Troponin 102. UA indicative of severe UTI and positive for yeast. COVID and flu negative. CT abd/pelv as well as CXR without acute finding. ER administered IVFs, IV rocephin, and oral kayexalate. Patient reportedly vomited after kayexalate. Repeat troponin 89 and repeat K 5.4. she was admitted for acute on chronic renal failure, n/v/d, and UTI. Today she is sitting up in bed and feeling better than she has in the last 2 weeks. She is pleasant. No confusion. Denies SOA or CP.     Past Medical History:      Diagnosis Date    Arthritis     CAD (coronary artery disease)     Chronic kidney disease     ? low functioning per pt    Diabetes mellitus (HCC)     Hyperlipidemia     Hypertension     Thyroid disease        Past Surgical History:      Procedure Laterality Date    CARDIAC SURGERY      3 stents in  2018    CAROTID ENDARTERECTOMY Right     CAROTID ENDARTERECTOMY Right      SECTION      CHOLECYSTECTOMY      COLONOSCOPY      HYSTERECTOMY (CERVIX STATUS UNKNOWN)         Medications Prior to Admission:    Prior to Admission medications    Medication Sig Start Date End Date Taking? Authorizing Provider   oxyCODONE-acetaminophen (PERCOCET) 7.5-325 MG per tablet Take 1 tablet by mouth every 8 hours as needed.   Yes  apparent focal deficits noted   Psychiatric:  Speech and behavior appropriate         Lab Results   Component Value Date     03/13/2025    K 5.0 03/13/2025     (H) 03/13/2025    CO2 21 03/13/2025    BUN 64 (H) 03/13/2025    CREATININE 2.8 (H) 03/13/2025    GLUCOSE 147 (H) 03/13/2025    CALCIUM 8.8 03/13/2025    BILITOT 0.4 03/13/2025    ALKPHOS 43 03/13/2025    AST 60 (H) 03/13/2025    ALT 39 (H) 03/13/2025    LABGLOM 16 (L) 03/13/2025    GFRAA 43 (L) 08/19/2022    AGRATIO 1.0 03/13/2025    GLOB 2.8 03/13/2025           Lab Results   Component Value Date    WBC 7.0 03/13/2025    HGB 9.1 (L) 03/13/2025    HCT 29.2 (L) 03/13/2025    MCV 99.3 03/13/2025     03/13/2025       CT ABDOMEN PELVIS WO CONTRAST Additional Contrast? None   Final Result      1.  No acute intra-abdominopelvic abnormality.   2.  Colonic diverticulosis.   3.  Incidental note of 3.5 cm cyst in the right ovary. Recommend a dedicated   follow-up pelvic ultrasound in approximately 6 to 12 months to assess for   stability.            Electronically signed by Otoniel Cates      XR CHEST PORTABLE   Final Result   1.  No acute cardiopulmonary findings.      Electronically signed by Arley Mcintyre         Additional notes  Acute renal failure in setting of CKD 4: Baseline Cr ~ 2.2-2.5 per chart review. Upon arrival Cr 3.2. suspect related to dehydration with n/v/d. CT Abd/pelv negative and post void residual unremarkable. Hold lisinopril. Hold jardiance. Continue NS @ 75 ml/hr.    N/v/d: improved. Treating UTI as below. Hydrating and continue prn anti emetics. CT abd/pelv negative.     Anemia: b12 deficient. Started b12 replacement. Iron pending. Folic acid wnl. Could consider procrit in future with CKD as well. On ppi. Ct abd/pelv negative as above.    UTI: History of ESBL. Renally dosed merrem. Contact precautions. Post void residual unremarkable. Follow culture.    Transaminitis: mild and improving. Suspect related to acute illness.

## 2025-03-13 NOTE — PROGRESS NOTES
Physical Therapy  Facility/Department: [facility] MED SURG  Physical Therapy Initial Assessment    Name: Kailee Cotton  : 1940  MRN: [id number]  Date of Service: 3/13/2025    Discharge Recommendations:  Home with assist PRN          Patient Diagnosis(es): The primary encounter diagnosis was Acute renal failure superimposed on chronic kidney disease, unspecified acute renal failure type, unspecified CKD stage. Diagnoses of Nausea vomiting and diarrhea, Acute cystitis without hematuria, Hyperkalemia, and Dehydration were also pertinent to this visit.  Past Medical History:  has a past medical history of Arthritis, CAD (coronary artery disease), Chronic kidney disease, Diabetes mellitus (HCC), Hyperlipidemia, Hypertension, and Thyroid disease.  Past Surgical History:  has a past surgical history that includes Cholecystectomy; Hysterectomy;  section; Colonoscopy; Cardiac surgery; Carotid endarterectomy (Right); and Carotid endarterectomy (Right).    Assessment  Body Structures, Functions, Activity Limitations Requiring Skilled Therapeutic Intervention: Decreased functional mobility ;Decreased ADL status;Decreased ROM;Decreased body mechanics;Decreased strength;Decreased endurance;Decreased balance;Decreased high-level IADLs;Increased pain;Decreased posture  Assessment: Kailee Cotton is an 84 year old female that was referred to IP PT following admission to the hospital. Patient transitioned from supine to EOB with mod I. Patient went from sitting EOB to standing with CGA, gait belt and RW. Patient tolerated standing at EOB without loss of balance. Patient went from standing to sitting EOB to supine with mod I. Patient left in supine in bed, with bed alarm in place and call light in reach. Patient will benefit from skilled PT while IP to improve lower extremity strength and maintain it at baseline level.  Treatment Diagnosis: Generalized weakness  Therapy Prognosis: Good  Decision Making: Low

## 2025-03-13 NOTE — PROGRESS NOTES
Occupational Therapy  Facility/Department: [facility] MED SURG  Occupational Therapy Initial Assessment    Name: Kailee Cotton  : 1940  MRN: [id number]  Date of Service: 3/13/2025    Discharge Recommendations:  Home with assist PRN  OT Equipment Recommendations  Equipment Needed: No       Patient Diagnosis(es): The primary encounter diagnosis was Acute renal failure superimposed on chronic kidney disease, unspecified acute renal failure type, unspecified CKD stage. Diagnoses of Nausea vomiting and diarrhea, Acute cystitis without hematuria, Hyperkalemia, and Dehydration were also pertinent to this visit.  Past Medical History:  has a past medical history of Arthritis, CAD (coronary artery disease), Chronic kidney disease, Diabetes mellitus (HCC), Hyperlipidemia, Hypertension, and Thyroid disease.  Past Surgical History:  has a past surgical history that includes Cholecystectomy; Hysterectomy;  section; Colonoscopy; Cardiac surgery; Carotid endarterectomy (Right); and Carotid endarterectomy (Right).    Assessment  Performance deficits / Impairments: Decreased endurance  Assessment: This 84 year old female was referred to OT services upon admission following onset of acute on chronic kidney failure. Pt is pleasant and cooperative, on room air, and oriented x4 with min verbal cuing for correcting year. Pt transferred to sitting at EOB with MOD I. Pt sat unsupported at EOB. Pt donned LB clothing with SBA. Pt come to stand with SBA and ambulated to bathroom with RW. Pt transferred to toilet with SBA with min verbal cuing for use of RW. Pt completed toileting tasks with SBA. Pt come to stand and ambulated to sink washing hands with SBA. Pt tolerated well no reports of fatigue. Pt ambulated in hallway x400 feet with RW and SBA. Pt returned to room and transferred to EOB. Recliner PALACIO in room for patient to increase time out of bed. Pt transferred to recliner with SBA using RW. Pt positioned in chair for comfort.  Call light in reach upon exit. Pt appears near baseline. Will benefit from skilled OT services to maintain current level of function to decrease risk of decline.  Prognosis: Good  Decision Making: Low Complexity  REQUIRES OT FOLLOW-UP: Yes  Activity Tolerance  Activity Tolerance: Patient Tolerated treatment well     Plan  Occupational Therapy Plan  Times Per Week: 3-5  Times Per Day: Once a day  Therapy Duration: 1 Week  Current Treatment Recommendations: Strengthening, Balance training, Functional mobility training, Endurance training, Patient/Caregiver education & training, Safety education & training, Self-Care / ADL    Restrictions  Restrictions/Precautions  Restrictions/Precautions: Fall Risk, General Precautions  Activity Level: Up as Tolerated  Required Braces or Orthoses?: No    Subjective  General  Chart Reviewed: Yes  Patient assessed for rehabilitation services?: Yes  Family / Caregiver Present: No  Referring Practitioner: Maya Hardin PA-C  Diagnosis: Acute on Chronic Kidney Failure1  Subjective  Subjective: I had to come because of my kidneys I have been pretty sick its made me throw up and everything.  General Comment  Comments: No c/o pain     Social/Functional History  Social/Functional History  Lives With: Daughter  Type of Home: House  Home Layout: Performs ADL's on one level, Able to Live on Main level with bedroom/bathroom, Two level  Home Access: Stairs to enter with rails  Entrance Stairs - Number of Steps: 2 ARIANA  Bathroom Shower/Tub: Tub/Shower unit  Bathroom Toilet: Standard  Bathroom Equipment: Shower chair, 3-in-1 Commode  Bathroom Accessibility: Walker accessible  Home Equipment: Walker - Rolling, Cane, Rollator  Has the patient had two or more falls in the past year or any fall with injury in the past year?: No  Receives Help From: Family, Other (Comment) (House keeper on Tuesdays)  Prior Level of Assist for ADLs: Independent  Prior Level of Assist for Homemaking: Needs assistance (Has

## 2025-03-13 NOTE — ACP (ADVANCE CARE PLANNING)
Advance Care Planning     General Advance Care Planning (ACP) Conversation    Date of Conversation: 3/13/2025  Conducted with: Patient with Decision Making Capacity  Other persons present: None    Healthcare Decision Maker:   Primary Decision Maker: Shelly Sanders - Child - [phone]    Primary Decision Maker: jermaine ray - Child - [phone]     Today we documented Decision Maker(s) consistent with Legal Next of Kin hierarchy.  Content/Action Overview:  Has NO ACP documents-Information provided  Reviewed DNR/DNI and patient elects DNR order - referred to ACP Clinical Specialist & placed order  artificial nutrition, ventilation preferences, and resuscitation preferences  Code status conversation conducted by nursing staff and/or PA. ACP note placed by .     Length of Voluntary ACP Conversation in minutes:  <16 minutes (Non-Billable)    Kee Morales

## 2025-03-13 NOTE — FLOWSHEET NOTE
03/12/25 2027   Assessment   Charting Type Admission   Psychosocial   Psychosocial (WDL) WDL   Neurological   Level of Consciousness 0   Neuro (WDL) WDL   [place]mond Coma Scale   Eye Opening 4   Best Verbal Response 5   Best Motor Response 6   [place] Coma Scale Score 15   HEENT (Head, Ears, Eyes, Nose, & Throat)   HEENT (WDL) X   Teeth Dentures lower;Dentures upper   Respiratory   Respiratory (WDL) WDL   Cardiac   Cardiac (WDL) WDL   Gastrointestinal   Abdominal (WDL) X   Abdomen Inspection Rounded   Last BM (including prior to admit) 03/12/25   RUQ Bowel Sounds Hyperactive   LUQ Bowel Sounds Hyperactive   RLQ Bowel Sounds Hyperactive   LLQ Bowel Sounds Hyperactive   GI Symptoms Diarrhea   Genitourinary   Genitourinary (WDL) WDL   Peripheral Vascular   Peripheral Vascular (WDL) WDL   Skin Integumentary    Skin Color Pale   Skin Condition/Temp Warm;Dry   Skin Integrity Ecchymosis   Location BUE   Multiple Skin Integrity Sites No   Skin Integumentary (WDL) X   Musculoskeletal   Musculoskeletal (WDL) WDL

## 2025-03-14 PROBLEM — E16.2 HYPOGLYCEMIA: Status: ACTIVE | Noted: 2025-03-14

## 2025-03-14 LAB
ALBUMIN SERPL-MCNC: 2.9 G/DL (ref 3.4–4.8)
ALBUMIN/GLOB SERPL: 1 {RATIO} (ref 0.8–2)
ALP SERPL-CCNC: 41 U/L (ref 25–100)
ALT SERPL-CCNC: 39 U/L (ref 4–36)
ANION GAP SERPL CALCULATED.3IONS-SCNC: 11 MMOL/L (ref 3–16)
AST SERPL-CCNC: 65 U/L (ref 8–33)
BACTERIA BLD CULT: NORMAL
BASOPHILS # BLD: 0 K/UL (ref 0–0.1)
BASOPHILS NFR BLD: 0.6 %
BILIRUB SERPL-MCNC: 0.3 MG/DL (ref 0.3–1.2)
BUN SERPL-MCNC: 46 MG/DL (ref 6–20)
CALCIUM SERPL-MCNC: 9.1 MG/DL (ref 8.3–10.6)
CHLORIDE SERPL-SCNC: 111 MMOL/L (ref 98–107)
CO2 SERPL-SCNC: 21 MMOL/L (ref 20–30)
CREAT SERPL-MCNC: 2.3 MG/DL (ref 0.4–1.2)
EOSINOPHIL # BLD: 0.3 K/UL (ref 0–0.4)
EOSINOPHIL NFR BLD: 3.7 %
ERYTHROCYTE [DISTWIDTH] IN BLOOD BY AUTOMATED COUNT: 14 % (ref 11–16)
GFR SERPLBLD CREATININE-BSD FMLA CKD-EPI: 20 ML/MIN/{1.73_M2}
GLOBULIN SER CALC-MCNC: 2.8 G/DL
GLUCOSE BLD-MCNC: 123 MG/DL (ref 74–106)
GLUCOSE BLD-MCNC: 76 MG/DL (ref 74–106)
GLUCOSE BLD-MCNC: 92 MG/DL (ref 74–106)
GLUCOSE SERPL-MCNC: 65 MG/DL (ref 74–106)
HCT VFR BLD AUTO: 29.2 % (ref 37–47)
HGB BLD-MCNC: 9.1 G/DL (ref 11.5–16.5)
IMM GRANULOCYTES # BLD: 0 K/UL
IMM GRANULOCYTES NFR BLD: 0.3 % (ref 0–5)
LYMPHOCYTES # BLD: 1.6 K/UL (ref 1.5–4)
LYMPHOCYTES NFR BLD: 23.7 %
MAGNESIUM SERPL-MCNC: 2.4 MG/DL (ref 1.7–2.4)
MCH RBC QN AUTO: 31.1 PG (ref 27–32)
MCHC RBC AUTO-ENTMCNC: 31.2 G/DL (ref 31–35)
MCV RBC AUTO: 99.7 FL (ref 80–100)
MONOCYTES # BLD: 0.6 K/UL (ref 0.2–0.8)
MONOCYTES NFR BLD: 8.6 %
NEUTROPHILS # BLD: 4.2 K/UL (ref 2–7.5)
NEUTS SEG NFR BLD: 63.1 %
PERFORMED ON: ABNORMAL
PERFORMED ON: NORMAL
PERFORMED ON: NORMAL
PLATELET # BLD AUTO: 266 K/UL (ref 150–400)
PMV BLD AUTO: 10.6 FL (ref 6–10)
POTASSIUM SERPL-SCNC: 4.3 MMOL/L (ref 3.4–5.1)
PROT SERPL-MCNC: 5.7 G/DL (ref 6.4–8.3)
RBC # BLD AUTO: 2.93 M/UL (ref 3.8–5.8)
SODIUM SERPL-SCNC: 143 MMOL/L (ref 136–145)
WBC # BLD AUTO: 6.7 K/UL (ref 4–11)

## 2025-03-14 PROCEDURE — 85025 COMPLETE CBC W/AUTO DIFF WBC: CPT

## 2025-03-14 PROCEDURE — 97535 SELF CARE MNGMENT TRAINING: CPT | Performed by: OCCUPATIONAL THERAPIST

## 2025-03-14 PROCEDURE — 2580000003 HC RX 258: Performed by: PHYSICIAN ASSISTANT

## 2025-03-14 PROCEDURE — 1200000000 HC SEMI PRIVATE

## 2025-03-14 PROCEDURE — 99232 SBSQ HOSP IP/OBS MODERATE 35: CPT | Performed by: INTERNAL MEDICINE

## 2025-03-14 PROCEDURE — 6370000000 HC RX 637 (ALT 250 FOR IP): Performed by: PHYSICIAN ASSISTANT

## 2025-03-14 PROCEDURE — 36415 COLL VENOUS BLD VENIPUNCTURE: CPT

## 2025-03-14 PROCEDURE — 80053 COMPREHEN METABOLIC PANEL: CPT

## 2025-03-14 PROCEDURE — 83735 ASSAY OF MAGNESIUM: CPT

## 2025-03-14 PROCEDURE — 6370000000 HC RX 637 (ALT 250 FOR IP): Performed by: INTERNAL MEDICINE

## 2025-03-14 PROCEDURE — 6360000002 HC RX W HCPCS: Performed by: PHYSICIAN ASSISTANT

## 2025-03-14 PROCEDURE — 97116 GAIT TRAINING THERAPY: CPT

## 2025-03-14 RX ORDER — INSULIN GLARGINE 100 [IU]/ML
25 INJECTION, SOLUTION SUBCUTANEOUS NIGHTLY
Status: DISCONTINUED | OUTPATIENT
Start: 2025-03-14 | End: 2025-03-15 | Stop reason: HOSPADM

## 2025-03-14 RX ORDER — LACTOBACILLUS RHAMNOSUS GG 10B CELL
1 CAPSULE ORAL
Status: DISCONTINUED | OUTPATIENT
Start: 2025-03-15 | End: 2025-03-15 | Stop reason: HOSPADM

## 2025-03-14 RX ADMIN — LEVOTHYROXINE SODIUM 75 MCG: 75 TABLET ORAL at 07:53

## 2025-03-14 RX ADMIN — LINACLOTIDE 145 MCG: 145 CAPSULE, GELATIN COATED ORAL at 08:07

## 2025-03-14 RX ADMIN — CLOPIDOGREL BISULFATE 75 MG: 75 TABLET ORAL at 07:53

## 2025-03-14 RX ADMIN — MEROPENEM 500 MG: 500 INJECTION INTRAVENOUS at 21:03

## 2025-03-14 RX ADMIN — OXYCODONE HYDROCHLORIDE AND ACETAMINOPHEN 1 TABLET: 7.5; 325 TABLET ORAL at 07:53

## 2025-03-14 RX ADMIN — FLUCONAZOLE 150 MG: 150 TABLET ORAL at 20:49

## 2025-03-14 RX ADMIN — OXYCODONE HYDROCHLORIDE AND ACETAMINOPHEN 1 TABLET: 7.5; 325 TABLET ORAL at 20:50

## 2025-03-14 RX ADMIN — POLYETHYLENE GLYCOL (3350) 17 G: 17 POWDER, FOR SOLUTION ORAL at 08:07

## 2025-03-14 RX ADMIN — MEROPENEM 500 MG: 500 INJECTION INTRAVENOUS at 08:00

## 2025-03-14 RX ADMIN — CYANOCOBALAMIN 1000 MCG: 1000 INJECTION, SOLUTION INTRAMUSCULAR; SUBCUTANEOUS at 07:53

## 2025-03-14 RX ADMIN — ROPINIROLE HYDROCHLORIDE 1 MG: 1 TABLET, FILM COATED ORAL at 07:54

## 2025-03-14 RX ADMIN — FERROUS SULFATE TAB EC 324 MG (65 MG FE EQUIVALENT) 325 MG: 324 (65 FE) TABLET DELAYED RESPONSE at 07:54

## 2025-03-14 RX ADMIN — ROPINIROLE HYDROCHLORIDE 1 MG: 1 TABLET, FILM COATED ORAL at 13:52

## 2025-03-14 RX ADMIN — HEPARIN SODIUM 5000 UNITS: 5000 INJECTION INTRAVENOUS; SUBCUTANEOUS at 20:50

## 2025-03-14 RX ADMIN — PANTOPRAZOLE SODIUM 40 MG: 40 TABLET, DELAYED RELEASE ORAL at 07:53

## 2025-03-14 RX ADMIN — ASPIRIN 81 MG CHEWABLE TABLET 81 MG: 81 TABLET CHEWABLE at 07:53

## 2025-03-14 RX ADMIN — HEPARIN SODIUM 5000 UNITS: 5000 INJECTION INTRAVENOUS; SUBCUTANEOUS at 13:52

## 2025-03-14 RX ADMIN — INSULIN GLARGINE 25 UNITS: 100 INJECTION, SOLUTION SUBCUTANEOUS at 20:53

## 2025-03-14 RX ADMIN — B-COMPLEX W/ C & FOLIC ACID TAB 1 TABLET: TAB at 07:53

## 2025-03-14 RX ADMIN — HEPARIN SODIUM 5000 UNITS: 5000 INJECTION INTRAVENOUS; SUBCUTANEOUS at 07:54

## 2025-03-14 RX ADMIN — ROPINIROLE HYDROCHLORIDE 1 MG: 1 TABLET, FILM COATED ORAL at 20:50

## 2025-03-14 RX ADMIN — DOXEPIN HYDROCHLORIDE 50 MG: 25 CAPSULE ORAL at 20:50

## 2025-03-14 ASSESSMENT — PAIN DESCRIPTION - LOCATION: LOCATION: BACK

## 2025-03-14 ASSESSMENT — PAIN DESCRIPTION - DESCRIPTORS: DESCRIPTORS: ACHING

## 2025-03-14 NOTE — PROGRESS NOTES
Occupational Therapy  Facility/Department: [facility] MED SURG  Daily Treatment Note  NAME: Kailee Cotton  : 1940  MRN: [id number]    Date of Service: 3/14/2025    Discharge Recommendations:  Home with assist PRN         Patient Diagnosis(es): The primary encounter diagnosis was Acute renal failure superimposed on chronic kidney disease, unspecified acute renal failure type, unspecified CKD stage. Diagnoses of Nausea vomiting and diarrhea, Acute cystitis without hematuria, Hyperkalemia, and Dehydration were also pertinent to this visit.     Assessment   Assessment: Pt lying in bed upon OT arrival. Agreeable to co-tx with PTA. Pt reports being sleepy today. Pt completed bed mobility with MOD I. Pt stood from EOB with SUP and completed fx mobility to bathroom with SBA using RW.  Pt completed toileting task with SUP.. Pt completed LBD with SUP. Pt completed fx mobility using RW into hallway with SBA. Pt returned to room and requested to go back to bed. Pt states that she is ready to go home.  Pt left in bed with call light within reach.  Activity Tolerance: Patient tolerated treatment well  Discharge Recommendations: Home with assist PRN     Plan  Occupational Therapy Plan  Times Per Week: 3-5  Times Per Day: Once a day  Therapy Duration: 1 Week  Current Treatment Recommendations: Strengthening;Balance training;Functional mobility training;Endurance training;Patient/Caregiver education & training;Safety education & training;Self-Care / ADL      Subjective  Subjective  Subjective: I am sleepy today  Pain: 5-6/10 for R hip          Objective  Vitals     Bed Mobility Training  Bed Mobility Training: No  Overall Level of Assistance: Modified independent  Balance  Sitting: Intact  Standing: Intact  Transfer Training  Transfer Training: Yes  Overall Level of Assistance: Supervision;Stand by assistance  Sit to Stand: Supervision  Stand to Sit: Supervision  Stand Pivot Transfers: Supervision;Stand by assistance  Bed to  Chair: Supervision;Stand by assistance  Toilet Transfer: Supervision  Gait  Gait Training: Yes  Overall Level of Assistance: Stand by assistance  Distance (ft): 400 Feet  Assistive Device: Walker, rolling;Gait belt     ADL  Grooming: Supervision  LE Dressing: Supervision  Putting On/Taking Off Footwear: Supervision  Toileting: Supervision  Functional Mobility: Stand by assistance;Supervision        Safety Devices  Type of Devices: Left in bed;Bed alarm in place;Call light within reach         Goals  Short Term Goals  Time Frame for Short Term Goals: 1 week  Short Term Goal 1: Pt to complete bathing with MOD I.  Short Term Goal 2: Pt to complete dressing with MOD I.  Short Term Goal 3: Pt to complete ADL transfers with MOD I.  Short Term Goal 4: Pt to complete HEP with independence.  Short Term Goal 5: Pt to complete hygiene/grooming standing at sink with no LOB MOD I.      Therapy Time   Individual Concurrent Group Co-treatment   Time In 0949         Time Out 1003         Minutes 14             This note serves as a DC summary in the event of pt discharge.      Sara Sarmiento, OT

## 2025-03-14 NOTE — FLOWSHEET NOTE
03/14/25 0800   Assessment   Charting Type Shift assessment   Psychosocial   Psychosocial (WDL) WDL   Neurological   Level of Consciousness 0   Neuro (WDL) WDL   Desmond Coma Scale   Eye Opening 4   Best Verbal Response 5   Best Motor Response 6   [place] Coma Scale Score 15   HEENT (Head, Ears, Eyes, Nose, & Throat)   HEENT (WDL) X   Teeth Dentures lower;Dentures upper   Respiratory   Respiratory (WDL) WDL   Respiratory Interventions Cough & deep breathe;H.O.B. elevated   Cardiac   Cardiac Rhythm Sinus rhythm   Cardiac (WDL) X   Cardiac Monitor   Cardiac/Telemetry Monitor On Portable telemetry pack applied   Telemetry Box Number [id number]   Alarm Audible Yes   Telemetry Monitor Alarm Parameters 50/120   Pacemaker   Pacemaker Yes   Gastrointestinal   Abdominal (WDL) X   Abdomen Inspection Rounded   RUQ Bowel Sounds Active   LUQ Bowel Sounds Active   RLQ Bowel Sounds Active   LLQ Bowel Sounds Active   Genitourinary   Genitourinary (WDL) X   Dysuria (Pain/Burning w/Urination) Yes   Peripheral Vascular   Peripheral Vascular (WDL) WDL   Edema None   Anti-Embolism   Anti-Embolism Intervention Medication   Skin Integumentary    Skin Color Pale   Skin Condition/Temp Warm;Dry   Skin Integrity Ecchymosis   Location bue   Skin Integumentary (WDL) X   Musculoskeletal   Musculoskeletal (WDL) WDL   Care Plan - Chronic Conditions and Co-Morbidity   Care Plan - Patient's Chronic Conditions and Co-Morbidity Symptoms are Monitored and Maintained or Improved Monitor and assess patient's chronic conditions and comorbid symptoms for stability, deterioration, or improvement;Collaborate with multidisciplinary team to address chronic and comorbid conditions and prevent exacerbation or deterioration;Update acute care plan with appropriate goals if chronic or comorbid symptoms are exacerbated and prevent overall improvement and discharge   Care Plan - Discharge Planning Goals   Discharge to home or other facility with appropriate resources  Identify barriers to discharge with patient and caregiver

## 2025-03-14 NOTE — PROGRESS NOTES
Progress Note      Subjective:   Chief complaint: weakness, n/v/d    Interval History:   Sitting up in bed. Feeling better. No vomiting or diarrhea. Had formed BM yesterday. Renal function improving. Hypoglycemic at 65 around 430 AM. Working with pt/ot and feels her strength is improving. She hopes to go home tomorrow.    Review of systems:   Constitutional:  Denies fever or chills. Positive for generalized weakness.  Eyes:  Denies eye pain or redness  HENT:  Denies nasal congestion or sore throat   Respiratory:  Denies cough or shortness of breath   Cardiovascular:  Denies chest pain or edema   GI:  Denies abdominal pain, bloody stools, nausea, vomiting, diarrhea   :  Denies dysuria or frequency  Musculoskeletal:  Denies acute neck pain or body aches  Integument:  Denies rash or itching  Neurologic:  Denies headache, dizziness, numbness, tingling or unilateral weakness  Psychiatric:  Denies acute depression or acute anxiety    Past medical history, surgical history, family history and social history reviewed and unchanged compared to H&P earlier this admission.    Medications:   Scheduled Meds:   insulin glargine  25 Units SubCUTAneous Nightly    cyanocobalamin  1,000 mcg IntraMUSCular Daily    vitamin D  50,000 Units Oral Weekly    doxepin  50 mg Oral Nightly    aspirin  81 mg Oral Daily    [Held by provider] atorvastatin  80 mg Oral Daily    vitamin B complex w/C  1 tablet Oral Daily    clopidogrel  75 mg Oral Daily    [Held by provider] empagliflozin  10 mg Oral Daily    ferrous sulfate  325 mg Oral Daily with breakfast    levothyroxine  75 mcg Oral Daily    linaclotide  145 mcg Oral QAM AC    [Held by provider] lisinopril  30 mg Oral Daily    pantoprazole  40 mg Oral QAM AC    polyethylene glycol  17 g Oral Daily    rOPINIRole  1 mg Oral TID    heparin (porcine)  5,000 Units SubCUTAneous 3 times per day    insulin lispro  0-4 Units SubCUTAneous 4x Daily AC & HS    meropenem  500 mg IntraVENous Q12H     and diarrhea [R11.2, R19.7]  Most exactly about etiology.  Treat any acute issues like UTI, hyperkalemia and acute renal failure.  Treat supportively and symptomatically and monitor response.  CT scan of the abdomen pelvis with benign findings.   03/13/2025    Generalized weakness [R53.1]  Treat any acute issues and try to improve other chronic medical problems as able.  Replace B12 and vitamin D.  Treat UTI, hyperkalemia and acute renal failure.   03/13/2025    Hyperkalemia [E87.5]  Received Kayexalate and gentle hydration.  Monitor closely.   02/13/2021    Acute on chronic kidney failure [N17.9, N18.9]  Improving.  Fluctuating Cr, suspect baseline ~1.7-2.2.  2.3 today.  Try to avoid any nephrotoxic agent.  Continue to hold lisinopril and Jardiance for now.  Received 3L, Hold on additional IVFs for now and monitor daily labs.  Make sure blood pressure under good control.  Monitor renal function, electrolytes and urine output closely.   02/13/2021    Acute cystitis without hematuria [N30.00]  Continue on Merrem, hx of ESBL.  S/p Diflucan x 3 days.  Monitor response and culture result.   02/13/2021    Anemia [D64.9]  Workup showed B12 deficient.  Started on replacement.  Monitor hemoglobin level closely.  GI prophylaxis.  Could be related to chronic kidney disease.   02/13/2021    Other specified hypothyroidism [E03.8]  Continue current dose of thyroxine.  TSH within normal limit.   02/13/2021    Type 2 diabetes mellitus, with long-term current use of insulin ([facility]) [E11.9, Z79.4]  A1c is elevated at 8 but better compared to prior A1c.  Jardiance on hold with her acute on chronic renal failure.  Hypoglycemic today.  Report Lantus 70 units q a.m. and 20 units q hs.  Started 50 units q a.m., decreased to 25 units qhs.  Monitor fingersticks closely and adjust regimen prn, cover with/scale insulin if needed.   02/13/2021       Electronically signed by Jacquie Unger,  on 3/14/2025 at 9:56 PM

## 2025-03-14 NOTE — PROGRESS NOTES
Physical Therapy  Facility/Department: [facility] MED SURG  Daily Treatment Note  NAME: Kailee Cotton  : 1940  MRN: [id number]    Date of Service: 3/14/2025    Discharge Recommendations:  Home with assist PRN      Patient Diagnosis(es): The primary encounter diagnosis was Acute renal failure superimposed on chronic kidney disease, unspecified acute renal failure type, unspecified CKD stage. Diagnoses of Nausea vomiting and diarrhea, Acute cystitis without hematuria, Hyperkalemia, and Dehydration were also pertinent to this visit.    Assessment  Assessment: Patient completed bed mobility tasks with Mod I. Stood with supervision and ambulated with RW to the bathroom and toileted with SBA/supervision. Patient ambulated in the hallway 400 feet with RW and SBA and then transferred back to bed to try to take a nap.  Activity Tolerance: Patient tolerated treatment well    Plan  Physical Therapy Plan  General Plan: 3-5 times per week  Days Per Week: 5 Days  Current Treatment Recommendations: Strengthening;Functional mobility training;ADL/Self-care training;IADL training;Endurance training;Gait training;Pain management;Home exercise program;Patient/Caregiver education & training;Therapeutic activities;Co-Treatment    Restrictions  Restrictions/Precautions  Restrictions/Precautions: Fall Risk, General Precautions  Activity Level: Up as Tolerated  Required Braces or Orthoses?: No     Subjective   Subjective  Subjective: Patient presents awake in bed with IV infusing. States she's sleepy but ok otherwise.    Objective  Bed Mobility Training  Bed Mobility Training: No  Overall Level of Assistance: Modified independent  Balance  Sitting: Intact  Standing: Intact  Transfer Training  Transfer Training: Yes  Overall Level of Assistance: Supervision;Stand by assistance  Sit to Stand: Supervision  Stand to Sit: Supervision  Stand Pivot Transfers: Supervision;Stand by assistance  Bed to Chair: Supervision;Stand by

## 2025-03-15 VITALS
TEMPERATURE: 97.7 F | HEART RATE: 52 BPM | BODY MASS INDEX: 31.15 KG/M2 | OXYGEN SATURATION: 97 % | SYSTOLIC BLOOD PRESSURE: 105 MMHG | DIASTOLIC BLOOD PRESSURE: 46 MMHG | WEIGHT: 175.8 LBS | RESPIRATION RATE: 16 BRPM | HEIGHT: 63 IN

## 2025-03-15 LAB
ALBUMIN SERPL-MCNC: 2.8 G/DL (ref 3.4–4.8)
ALBUMIN/GLOB SERPL: 1 {RATIO} (ref 0.8–2)
ALP SERPL-CCNC: 39 U/L (ref 25–100)
ALT SERPL-CCNC: 38 U/L (ref 4–36)
ANION GAP SERPL CALCULATED.3IONS-SCNC: 10 MMOL/L (ref 3–16)
AST SERPL-CCNC: 61 U/L (ref 8–33)
BACTERIA UR CULT: ABNORMAL
BASOPHILS # BLD: 0 K/UL (ref 0–0.1)
BASOPHILS NFR BLD: 0.5 %
BILIRUB SERPL-MCNC: 0.3 MG/DL (ref 0.3–1.2)
BUN SERPL-MCNC: 33 MG/DL (ref 6–20)
CALCIUM SERPL-MCNC: 9.1 MG/DL (ref 8.3–10.6)
CHLORIDE SERPL-SCNC: 110 MMOL/L (ref 98–107)
CO2 SERPL-SCNC: 22 MMOL/L (ref 20–30)
CREAT SERPL-MCNC: 1.7 MG/DL (ref 0.4–1.2)
EOSINOPHIL # BLD: 0.4 K/UL (ref 0–0.4)
EOSINOPHIL NFR BLD: 6.5 %
ERYTHROCYTE [DISTWIDTH] IN BLOOD BY AUTOMATED COUNT: 14.2 % (ref 11–16)
GFR SERPLBLD CREATININE-BSD FMLA CKD-EPI: 29 ML/MIN/{1.73_M2}
GLOBULIN SER CALC-MCNC: 2.7 G/DL
GLUCOSE BLD-MCNC: 145 MG/DL (ref 74–106)
GLUCOSE BLD-MCNC: 88 MG/DL (ref 74–106)
GLUCOSE SERPL-MCNC: 87 MG/DL (ref 74–106)
HCT VFR BLD AUTO: 28.7 % (ref 37–47)
HGB BLD-MCNC: 8.9 G/DL (ref 11.5–16.5)
IMM GRANULOCYTES # BLD: 0 K/UL
IMM GRANULOCYTES NFR BLD: 0.3 % (ref 0–5)
LYMPHOCYTES # BLD: 1.8 K/UL (ref 1.5–4)
LYMPHOCYTES NFR BLD: 27.8 %
MCH RBC QN AUTO: 30.9 PG (ref 27–32)
MCHC RBC AUTO-ENTMCNC: 31 G/DL (ref 31–35)
MCV RBC AUTO: 99.7 FL (ref 80–100)
MONOCYTES # BLD: 0.5 K/UL (ref 0.2–0.8)
MONOCYTES NFR BLD: 7.2 %
NEUTROPHILS # BLD: 3.8 K/UL (ref 2–7.5)
NEUTS SEG NFR BLD: 57.7 %
ORGANISM: ABNORMAL
PERFORMED ON: ABNORMAL
PERFORMED ON: NORMAL
PLATELET # BLD AUTO: 260 K/UL (ref 150–400)
PMV BLD AUTO: 10.6 FL (ref 6–10)
POTASSIUM SERPL-SCNC: 4.5 MMOL/L (ref 3.4–5.1)
PROT SERPL-MCNC: 5.5 G/DL (ref 6.4–8.3)
RBC # BLD AUTO: 2.88 M/UL (ref 3.8–5.8)
SODIUM SERPL-SCNC: 142 MMOL/L (ref 136–145)
WBC # BLD AUTO: 6.5 K/UL (ref 4–11)

## 2025-03-15 PROCEDURE — 6360000002 HC RX W HCPCS: Performed by: PHYSICIAN ASSISTANT

## 2025-03-15 PROCEDURE — 85025 COMPLETE CBC W/AUTO DIFF WBC: CPT

## 2025-03-15 PROCEDURE — 36415 COLL VENOUS BLD VENIPUNCTURE: CPT

## 2025-03-15 PROCEDURE — 2580000003 HC RX 258: Performed by: PHYSICIAN ASSISTANT

## 2025-03-15 PROCEDURE — 80053 COMPREHEN METABOLIC PANEL: CPT

## 2025-03-15 PROCEDURE — 6370000000 HC RX 637 (ALT 250 FOR IP): Performed by: PHYSICIAN ASSISTANT

## 2025-03-15 PROCEDURE — 99238 HOSP IP/OBS DSCHRG MGMT 30/<: CPT | Performed by: PHYSICIAN ASSISTANT

## 2025-03-15 RX ORDER — INSULIN GLARGINE 100 [IU]/ML
25 INJECTION, SOLUTION SUBCUTANEOUS NIGHTLY
Qty: 10 ML | Refills: 0 | Status: SHIPPED
Start: 2025-03-15

## 2025-03-15 RX ORDER — ERGOCALCIFEROL 1.25 MG/1
50000 CAPSULE, LIQUID FILLED ORAL WEEKLY
Qty: 5 CAPSULE | Refills: 0 | Status: SHIPPED | OUTPATIENT
Start: 2025-03-15

## 2025-03-15 RX ORDER — LACTOBACILLUS RHAMNOSUS GG 10B CELL
1 CAPSULE ORAL
Qty: 30 CAPSULE | Refills: 0 | Status: SHIPPED | OUTPATIENT
Start: 2025-03-16

## 2025-03-15 RX ORDER — CEFDINIR 300 MG/1
300 CAPSULE ORAL DAILY
Qty: 2 CAPSULE | Refills: 0 | Status: SHIPPED | OUTPATIENT
Start: 2025-03-16 | End: 2025-03-18

## 2025-03-15 RX ORDER — NITROGLYCERIN 0.4 MG/1
TABLET SUBLINGUAL
Qty: 25 TABLET | Refills: 0 | Status: SHIPPED | OUTPATIENT
Start: 2025-03-15

## 2025-03-15 RX ORDER — CYANOCOBALAMIN 1000 UG/ML
1000 INJECTION, SOLUTION INTRAMUSCULAR; SUBCUTANEOUS
Qty: 4 ML | Refills: 0 | Status: SHIPPED | OUTPATIENT
Start: 2025-03-15 | End: 2025-04-14

## 2025-03-15 RX ORDER — INSULIN LISPRO 100 [IU]/ML
INJECTION, SOLUTION INTRAVENOUS; SUBCUTANEOUS
Qty: 5 ADJUSTABLE DOSE PRE-FILLED PEN SYRINGE | Refills: 0 | Status: SHIPPED | OUTPATIENT
Start: 2025-03-15

## 2025-03-15 RX ADMIN — CYANOCOBALAMIN 1000 MCG: 1000 INJECTION, SOLUTION INTRAMUSCULAR; SUBCUTANEOUS at 08:35

## 2025-03-15 RX ADMIN — LEVOTHYROXINE SODIUM 75 MCG: 75 TABLET ORAL at 06:10

## 2025-03-15 RX ADMIN — LINACLOTIDE 145 MCG: 145 CAPSULE, GELATIN COATED ORAL at 06:10

## 2025-03-15 RX ADMIN — B-COMPLEX W/ C & FOLIC ACID TAB 1 TABLET: TAB at 08:34

## 2025-03-15 RX ADMIN — FERROUS SULFATE TAB EC 324 MG (65 MG FE EQUIVALENT) 324 MG: 324 (65 FE) TABLET DELAYED RESPONSE at 08:34

## 2025-03-15 RX ADMIN — ACETAMINOPHEN 650 MG: 325 TABLET, FILM COATED ORAL at 06:09

## 2025-03-15 RX ADMIN — EMPAGLIFLOZIN 10 MG: 10 TABLET, FILM COATED ORAL at 08:34

## 2025-03-15 RX ADMIN — Medication 1 CAPSULE: at 08:34

## 2025-03-15 RX ADMIN — MEROPENEM 500 MG: 500 INJECTION INTRAVENOUS at 08:40

## 2025-03-15 RX ADMIN — HEPARIN SODIUM 5000 UNITS: 5000 INJECTION INTRAVENOUS; SUBCUTANEOUS at 06:10

## 2025-03-15 RX ADMIN — PANTOPRAZOLE SODIUM 40 MG: 40 TABLET, DELAYED RELEASE ORAL at 06:10

## 2025-03-15 RX ADMIN — ASPIRIN 81 MG CHEWABLE TABLET 81 MG: 81 TABLET CHEWABLE at 08:34

## 2025-03-15 RX ADMIN — ROPINIROLE HYDROCHLORIDE 1 MG: 1 TABLET, FILM COATED ORAL at 08:34

## 2025-03-15 RX ADMIN — ONDANSETRON 4 MG: 2 INJECTION INTRAMUSCULAR; INTRAVENOUS at 01:08

## 2025-03-15 RX ADMIN — EPOETIN ALFA-EPBX 10000 UNITS: 10000 INJECTION, SOLUTION INTRAVENOUS; SUBCUTANEOUS at 08:35

## 2025-03-15 RX ADMIN — CLOPIDOGREL BISULFATE 75 MG: 75 TABLET ORAL at 08:34

## 2025-03-15 ASSESSMENT — PAIN DESCRIPTION - DESCRIPTORS: DESCRIPTORS: ACHING;THROBBING

## 2025-03-15 ASSESSMENT — PAIN DESCRIPTION - LOCATION: LOCATION: HEAD

## 2025-03-15 ASSESSMENT — PAIN SCALES - GENERAL: PAINLEVEL_OUTOF10: 6

## 2025-03-15 NOTE — FLOWSHEET NOTE
03/15/25 0835   Assessment   Charting Type Shift assessment   Psychosocial   Psychosocial (WDL) WDL   Neurological   Level of Consciousness 0   Neuro (WDL) WDL   [place] Coma Scale   Eye Opening 4   Best Verbal Response 5   Best Motor Response 6   [place] Coma Scale Score 15   HEENT (Head, Ears, Eyes, Nose, & Throat)   HEENT (WDL) X   Teeth Dentures lower;Dentures upper   Respiratory   Respiratory (WDL) WDL   Cardiac   Cardiac Rhythm Sinus rhythm   Cardiac (WDL) X   Cardiac Monitor   Telemetry Box Number [id number]   Alarm Audible Yes   Telemetry Monitor Alarm Parameters 50/120   Pacemaker   Pacemaker Yes   Gastrointestinal   Abdominal (WDL) X   Abdomen Inspection Rounded   RUQ Bowel Sounds Active   LUQ Bowel Sounds Active   RLQ Bowel Sounds Active   LLQ Bowel Sounds Active   Genitourinary   Genitourinary (WDL) X   Dysuria (Pain/Burning w/Urination) Yes   Peripheral Vascular   Peripheral Vascular (WDL) WDL   Edema None   Anti-Embolism   Anti-Embolism Intervention Medication   Skin Integumentary    Skin Color Pale   Skin Condition/Temp Warm;Dry   Skin Integumentary (WDL) X   Musculoskeletal   Musculoskeletal (WDL) WDL

## 2025-03-15 NOTE — DISCHARGE INSTRUCTIONS
Disposition: home  Discharged Condition: Stable  Activity: activity as tolerated  Diet: diabetic diet  Follow Up: Primary Care Provider in 1 week. Follow up with nephrology as scheduled.   Patient to proceed with the following lab (cbc, cmp) in 2 weeks  Check your blood sugar 3 times daily and write down readings to take to pcp so insulin regimen can continue to be adjusted. Can also dose low dose sliding scale insulin as needed when you check your sugar throughout the day. Your insulin dose was changed due to hypoglycemia.

## 2025-03-15 NOTE — DISCHARGE SUMMARY
appropriate pen needles  Associated Diagnoses: Type 2 diabetes mellitus with stage 3 chronic kidney disease, with long-term current use of insulin, unspecified whether stage 3a or 3b CKD (HCC)      nitroGLYCERIN (NITROSTAT) 0.4 MG SL tablet Take with onset of chest pain, up to max of 3 total doses 5 min apart. If no relief after 3 doses, call 911 or go to the Emergency Room  Qty: 25 tablet, Refills: 0      vitamin D (ERGOCALCIFEROL) 1.25 MG (81909 UT) CAPS capsule Take 1 capsule by mouth once a week  Qty: 5 capsule, Refills: 0                Details   oxyCODONE-acetaminophen (PERCOCET) 7.5-325 MG per tablet Take 1 tablet by mouth every 8 hours as needed.      allopurinol (ZYLOPRIM) 100 MG tablet Take 0.5 tablets by mouth daily      linaclotide (LINZESS) 145 MCG capsule Take 1 capsule by mouth every morning (before breakfast)      empagliflozin (JARDIANCE) 25 MG tablet Take 1 tablet by mouth daily      rOPINIRole (REQUIP) 1 MG tablet Take 1 tablet by mouth 3 times daily      polyethylene glycol (GLYCOLAX) 17 g packet Take 1 packet by mouth daily      b complex vitamins capsule Take 1 capsule by mouth daily      furosemide (LASIX) 20 MG tablet Take 1 tablet by mouth daily as needed (worsening swelling or weight gain > 3 lbs) Take 20mg by mouth every day for 4-5 days and stop for lower extremity edema  Qty: 60 tablet, Refills: 3      omeprazole (PRILOSEC) 40 MG delayed release capsule Take 1 capsule by mouth daily      ferrous sulfate (IRON 325) 325 (65 Fe) MG tablet Take 1 tablet by mouth daily (with breakfast)      fenofibrate (TRIGLIDE) 160 MG tablet Take 1 tablet by mouth daily      atorvastatin (LIPITOR) 80 MG tablet Take 1 tablet by mouth daily      clopidogrel (PLAVIX) 75 MG tablet Take 1 tablet by mouth daily      doxepin (SINEQUAN) 50 MG capsule Take 1 capsule by mouth nightly      mometasone-formoterol (DULERA) 200-5 MCG/ACT inhaler Inhale 2 puffs into the lungs 2 times daily      montelukast (SINGULAIR) 10  MG tablet Take 10 mg by mouth nightly.      levothyroxine (SYNTHROID) 75 MCG tablet Take 1 tablet by mouth Daily      aspirin 81 MG EC tablet Take 1 tablet by mouth daily             14 minutes spent discharging this patient.     The case was discussed with Dr. Michelle by phone and plan of care reviewed.    Signed:  Electronically signed by MAGGIE Nichols on 3/15/2025 at 10:33 AM       Thank you Xi Silva APRN - THERON for the opportunity to be involved in this patient's care. If you have any questions or concerns please feel free to contact me at [phone].

## 2025-03-15 NOTE — PROGRESS NOTES
DC instructions reviewed with pt at bedside. Pt verbalizes understanding. Pt awaiting for daughter to arrive. Pt instructed to call out with any additional questions.

## 2025-03-17 LAB — BACTERIA BLD CULT: NORMAL

## 2025-05-25 ENCOUNTER — APPOINTMENT (OUTPATIENT)
Dept: GENERAL RADIOLOGY | Facility: HOSPITAL | Age: 85
DRG: 313 | End: 2025-05-25
Attending: EMERGENCY MEDICINE
Payer: MEDICARE

## 2025-05-25 ENCOUNTER — HOSPITAL ENCOUNTER (INPATIENT)
Facility: HOSPITAL | Age: 85
LOS: 1 days | Discharge: HOME | DRG: 313 | End: 2025-05-26
Attending: EMERGENCY MEDICINE | Admitting: INTERNAL MEDICINE
Payer: MEDICARE

## 2025-05-25 DIAGNOSIS — N30.00 ACUTE CYSTITIS WITHOUT HEMATURIA: ICD-10-CM

## 2025-05-25 DIAGNOSIS — R07.9 CHEST PAIN, UNSPECIFIED TYPE: ICD-10-CM

## 2025-05-25 DIAGNOSIS — N17.9 ACUTE RENAL FAILURE, UNSPECIFIED ACUTE RENAL FAILURE TYPE: Primary | ICD-10-CM

## 2025-05-25 DIAGNOSIS — B37.49 CANDIDURIA: ICD-10-CM

## 2025-05-25 LAB
BACTERIA URNS QL MICRO: ABNORMAL /HPF
BASOPHILS # BLD: 0 K/UL (ref 0–0.1)
BASOPHILS NFR BLD: 0.5 %
BILIRUB UR QL STRIP.AUTO: NEGATIVE
CLARITY UR: ABNORMAL
COLOR UR: YELLOW
EOSINOPHIL # BLD: 0.2 K/UL (ref 0–0.4)
EOSINOPHIL NFR BLD: 2.2 %
EPI CELLS #/AREA URNS HPF: ABNORMAL /HPF (ref 0–5)
ERYTHROCYTE [DISTWIDTH] IN BLOOD BY AUTOMATED COUNT: 13.2 % (ref 11–16)
GLUCOSE UR STRIP.AUTO-MCNC: 500 MG/DL
HCT VFR BLD AUTO: 37.6 % (ref 37–47)
HGB BLD-MCNC: 12 G/DL (ref 11.5–16.5)
HGB UR QL STRIP.AUTO: ABNORMAL
IMM GRANULOCYTES # BLD: 0 K/UL
IMM GRANULOCYTES NFR BLD: 0.2 % (ref 0–5)
KETONES UR STRIP.AUTO-MCNC: NEGATIVE MG/DL
LEUKOCYTE ESTERASE UR QL STRIP.AUTO: ABNORMAL
LYMPHOCYTES # BLD: 2.6 K/UL (ref 1.5–4)
LYMPHOCYTES NFR BLD: 32 %
MCH RBC QN AUTO: 31.3 PG (ref 27–32)
MCHC RBC AUTO-ENTMCNC: 31.9 G/DL (ref 31–35)
MCV RBC AUTO: 97.9 FL (ref 80–100)
MONOCYTES # BLD: 0.6 K/UL (ref 0.2–0.8)
MONOCYTES NFR BLD: 6.8 %
NEUTROPHILS # BLD: 4.7 K/UL (ref 2–7.5)
NEUTS SEG NFR BLD: 58.3 %
NITRITE UR QL STRIP.AUTO: NEGATIVE
PH UR STRIP.AUTO: 5.5 [PH] (ref 5–8)
PLATELET # BLD AUTO: 291 K/UL (ref 150–400)
PMV BLD AUTO: 11.3 FL (ref 6–10)
PROT UR STRIP.AUTO-MCNC: NEGATIVE MG/DL
RBC # BLD AUTO: 3.84 M/UL (ref 3.8–5.8)
RBC #/AREA URNS HPF: ABNORMAL /HPF (ref 0–4)
SP GR UR STRIP.AUTO: 1.01 (ref 1–1.03)
UA COMPLETE W REFLEX CULTURE PNL UR: YES
UA DIPSTICK W REFLEX MICRO PNL UR: YES
URN SPEC COLLECT METH UR: ABNORMAL
UROBILINOGEN UR STRIP-ACNC: 0.2 E.U./DL
WBC # BLD AUTO: 8.1 K/UL (ref 4–11)
WBC #/AREA URNS HPF: ABNORMAL /HPF (ref 0–5)
YEAST URNS QL MICRO: PRESENT /HPF

## 2025-05-25 PROCEDURE — 87186 SC STD MICRODIL/AGAR DIL: CPT

## 2025-05-25 PROCEDURE — 87040 BLOOD CULTURE FOR BACTERIA: CPT

## 2025-05-25 PROCEDURE — 71045 X-RAY EXAM CHEST 1 VIEW: CPT

## 2025-05-25 PROCEDURE — 83605 ASSAY OF LACTIC ACID: CPT

## 2025-05-25 PROCEDURE — 85025 COMPLETE CBC W/AUTO DIFF WBC: CPT

## 2025-05-25 PROCEDURE — 99285 EMERGENCY DEPT VISIT HI MDM: CPT

## 2025-05-25 PROCEDURE — 87088 URINE BACTERIA CULTURE: CPT

## 2025-05-25 PROCEDURE — 84484 ASSAY OF TROPONIN QUANT: CPT

## 2025-05-25 PROCEDURE — 36415 COLL VENOUS BLD VENIPUNCTURE: CPT

## 2025-05-25 PROCEDURE — 80053 COMPREHEN METABOLIC PANEL: CPT

## 2025-05-25 PROCEDURE — 87086 URINE CULTURE/COLONY COUNT: CPT

## 2025-05-25 PROCEDURE — 87635 SARS-COV-2 COVID-19 AMP PRB: CPT

## 2025-05-25 PROCEDURE — 81001 URINALYSIS AUTO W/SCOPE: CPT

## 2025-05-25 PROCEDURE — 93005 ELECTROCARDIOGRAM TRACING: CPT

## 2025-05-25 ASSESSMENT — PAIN - FUNCTIONAL ASSESSMENT: PAIN_FUNCTIONAL_ASSESSMENT: NONE - DENIES PAIN

## 2025-05-26 VITALS
WEIGHT: 177.1 LBS | DIASTOLIC BLOOD PRESSURE: 55 MMHG | OXYGEN SATURATION: 99 % | RESPIRATION RATE: 16 BRPM | BODY MASS INDEX: 33.44 KG/M2 | TEMPERATURE: 98.2 F | HEART RATE: 69 BPM | SYSTOLIC BLOOD PRESSURE: 157 MMHG | HEIGHT: 61 IN

## 2025-05-26 PROBLEM — N17.9 ACUTE RENAL FAILURE: Status: RESOLVED | Noted: 2025-03-12 | Resolved: 2025-05-26

## 2025-05-26 PROBLEM — G93.40 ACUTE ENCEPHALOPATHY: Status: RESOLVED | Noted: 2023-01-23 | Resolved: 2025-05-26

## 2025-05-26 PROBLEM — N30.00 ACUTE CYSTITIS WITHOUT HEMATURIA: Status: RESOLVED | Noted: 2021-02-13 | Resolved: 2025-05-26

## 2025-05-26 PROBLEM — N17.9 ACUTE RENAL FAILURE (ARF): Status: RESOLVED | Noted: 2021-02-12 | Resolved: 2025-05-26

## 2025-05-26 PROBLEM — N18.4 CKD (CHRONIC KIDNEY DISEASE) STAGE 4, GFR 15-29 ML/MIN (HCC): Status: ACTIVE | Noted: 2025-05-26

## 2025-05-26 PROBLEM — Z95.5 HISTORY OF HEART ARTERY STENT: Status: ACTIVE | Noted: 2025-05-26

## 2025-05-26 PROBLEM — R07.9 CHEST PAIN: Status: ACTIVE | Noted: 2025-05-26

## 2025-05-26 PROBLEM — N39.0 UTI (URINARY TRACT INFECTION): Status: ACTIVE | Noted: 2025-05-26

## 2025-05-26 LAB
ALBUMIN SERPL-MCNC: 3.9 G/DL (ref 3.4–4.8)
ALBUMIN/GLOB SERPL: 1.1 {RATIO} (ref 0.8–2)
ALP SERPL-CCNC: 73 U/L (ref 25–100)
ALT SERPL-CCNC: 23 U/L (ref 4–36)
ANION GAP SERPL CALCULATED.3IONS-SCNC: 15 MMOL/L (ref 3–16)
AST SERPL-CCNC: 36 U/L (ref 8–33)
BILIRUB SERPL-MCNC: 0.3 MG/DL (ref 0.3–1.2)
BUN SERPL-MCNC: 44 MG/DL (ref 6–20)
CALCIUM SERPL-MCNC: 9.6 MG/DL (ref 8.3–10.6)
CHLORIDE SERPL-SCNC: 102 MMOL/L (ref 98–107)
CO2 SERPL-SCNC: 22 MMOL/L (ref 20–30)
CREAT SERPL-MCNC: 1.8 MG/DL (ref 0.6–1.2)
D DIMER PPP DDU-MCNC: 0.59 UG/ML FEU (ref 0–0.6)
GFR SERPLBLD CREATININE-BSD FMLA CKD-EPI: 27 ML/MIN/{1.73_M2}
GLOBULIN SER CALC-MCNC: 3.5 G/DL
GLUCOSE SERPL-MCNC: 274 MG/DL (ref 74–106)
LACTATE BLDV-SCNC: 1.8 MMOL/L (ref 0.4–2)
POTASSIUM SERPL-SCNC: 4.5 MMOL/L (ref 3.4–5.1)
PROT SERPL-MCNC: 7.4 G/DL (ref 6.4–8.3)
SARS-COV-2 RDRP RESP QL NAA+PROBE: NOT DETECTED
SODIUM SERPL-SCNC: 139 MMOL/L (ref 136–145)
TROPONIN, HIGH SENSITIVITY: 27 NG/L (ref 0–14)
TROPONIN, HIGH SENSITIVITY: 29 NG/L (ref 0–14)

## 2025-05-26 PROCEDURE — 36415 COLL VENOUS BLD VENIPUNCTURE: CPT

## 2025-05-26 PROCEDURE — 96365 THER/PROPH/DIAG IV INF INIT: CPT

## 2025-05-26 PROCEDURE — 1200000000 HC SEMI PRIVATE

## 2025-05-26 PROCEDURE — 6370000000 HC RX 637 (ALT 250 FOR IP): Performed by: PHYSICIAN ASSISTANT

## 2025-05-26 PROCEDURE — 2580000003 HC RX 258: Performed by: EMERGENCY MEDICINE

## 2025-05-26 PROCEDURE — 85379 FIBRIN DEGRADATION QUANT: CPT

## 2025-05-26 PROCEDURE — 84484 ASSAY OF TROPONIN QUANT: CPT

## 2025-05-26 PROCEDURE — 6360000002 HC RX W HCPCS: Performed by: EMERGENCY MEDICINE

## 2025-05-26 PROCEDURE — 99235 HOSP IP/OBS SAME DATE MOD 70: CPT | Performed by: INTERNAL MEDICINE

## 2025-05-26 PROCEDURE — G0378 HOSPITAL OBSERVATION PER HR: HCPCS

## 2025-05-26 RX ORDER — VITAMIN B COMPLEX
2000 TABLET ORAL DAILY
Status: DISCONTINUED | OUTPATIENT
Start: 2025-05-26 | End: 2025-05-26 | Stop reason: HOSPADM

## 2025-05-26 RX ORDER — INSULIN GLARGINE 100 [IU]/ML
25 INJECTION, SOLUTION SUBCUTANEOUS NIGHTLY
Status: DISCONTINUED | OUTPATIENT
Start: 2025-05-26 | End: 2025-05-26 | Stop reason: HOSPADM

## 2025-05-26 RX ORDER — LISINOPRIL 30 MG/1
30 TABLET ORAL DAILY
COMMUNITY

## 2025-05-26 RX ORDER — CLOPIDOGREL BISULFATE 75 MG/1
75 TABLET ORAL DAILY
Status: DISCONTINUED | OUTPATIENT
Start: 2025-05-26 | End: 2025-05-26 | Stop reason: HOSPADM

## 2025-05-26 RX ORDER — ASPIRIN 81 MG/1
81 TABLET ORAL DAILY
Status: DISCONTINUED | OUTPATIENT
Start: 2025-05-26 | End: 2025-05-26 | Stop reason: HOSPADM

## 2025-05-26 RX ORDER — PANTOPRAZOLE SODIUM 40 MG/1
40 TABLET, DELAYED RELEASE ORAL
Status: DISCONTINUED | OUTPATIENT
Start: 2025-05-27 | End: 2025-05-26 | Stop reason: HOSPADM

## 2025-05-26 RX ORDER — FENOFIBRATE 160 MG/1
160 TABLET ORAL DAILY
Status: DISCONTINUED | OUTPATIENT
Start: 2025-05-26 | End: 2025-05-26 | Stop reason: HOSPADM

## 2025-05-26 RX ORDER — FERROUS SULFATE 325(65) MG
325 TABLET ORAL
Status: DISCONTINUED | OUTPATIENT
Start: 2025-05-27 | End: 2025-05-26 | Stop reason: HOSPADM

## 2025-05-26 RX ORDER — ATORVASTATIN CALCIUM 80 MG/1
80 TABLET, FILM COATED ORAL DAILY
Status: DISCONTINUED | OUTPATIENT
Start: 2025-05-26 | End: 2025-05-26 | Stop reason: HOSPADM

## 2025-05-26 RX ORDER — CEFDINIR 300 MG/1
300 CAPSULE ORAL DAILY
Qty: 7 CAPSULE | Refills: 0 | Status: SHIPPED | OUTPATIENT
Start: 2025-05-26 | End: 2025-06-02

## 2025-05-26 RX ORDER — ROPINIROLE 1 MG/1
1 TABLET, FILM COATED ORAL 3 TIMES DAILY
Status: DISCONTINUED | OUTPATIENT
Start: 2025-05-26 | End: 2025-05-26 | Stop reason: HOSPADM

## 2025-05-26 RX ORDER — ERGOCALCIFEROL 1.25 MG/1
50000 CAPSULE, LIQUID FILLED ORAL WEEKLY
Status: DISCONTINUED | OUTPATIENT
Start: 2025-05-26 | End: 2025-05-26 | Stop reason: HOSPADM

## 2025-05-26 RX ORDER — FLUCONAZOLE 200 MG/1
200 TABLET ORAL DAILY
Qty: 3 TABLET | Refills: 0 | Status: SHIPPED | OUTPATIENT
Start: 2025-05-26 | End: 2025-05-29

## 2025-05-26 RX ORDER — POLYETHYLENE GLYCOL 3350 17 G/17G
17 POWDER, FOR SOLUTION ORAL DAILY
Status: DISCONTINUED | OUTPATIENT
Start: 2025-05-26 | End: 2025-05-26 | Stop reason: HOSPADM

## 2025-05-26 RX ORDER — ALBUTEROL SULFATE 90 UG/1
INHALANT RESPIRATORY (INHALATION)
COMMUNITY

## 2025-05-26 RX ORDER — DOXEPIN HYDROCHLORIDE 25 MG/1
50 CAPSULE ORAL NIGHTLY
Status: DISCONTINUED | OUTPATIENT
Start: 2025-05-26 | End: 2025-05-26 | Stop reason: HOSPADM

## 2025-05-26 RX ORDER — FUROSEMIDE 20 MG/1
20 TABLET ORAL DAILY PRN
Qty: 30 TABLET | Refills: 0 | Status: SHIPPED | OUTPATIENT
Start: 2025-05-26

## 2025-05-26 RX ORDER — OXYCODONE AND ACETAMINOPHEN 7.5; 325 MG/1; MG/1
1 TABLET ORAL EVERY 8 HOURS PRN
Refills: 0 | Status: DISCONTINUED | OUTPATIENT
Start: 2025-05-26 | End: 2025-05-26 | Stop reason: HOSPADM

## 2025-05-26 RX ORDER — LEVOTHYROXINE SODIUM 75 UG/1
75 TABLET ORAL DAILY
Status: DISCONTINUED | OUTPATIENT
Start: 2025-05-26 | End: 2025-05-26 | Stop reason: HOSPADM

## 2025-05-26 RX ADMIN — Medication 2000 UNITS: at 12:09

## 2025-05-26 RX ADMIN — CEFTRIAXONE 1000 MG: 1 INJECTION, POWDER, FOR SOLUTION INTRAMUSCULAR; INTRAVENOUS at 02:15

## 2025-05-26 RX ADMIN — ASPIRIN 81 MG: 81 TABLET, COATED ORAL at 12:09

## 2025-05-26 RX ADMIN — FENOFIBRATE 160 MG: 160 TABLET ORAL at 12:09

## 2025-05-26 RX ADMIN — ROPINIROLE HYDROCHLORIDE 1 MG: 1 TABLET, FILM COATED ORAL at 12:09

## 2025-05-26 RX ADMIN — CLOPIDOGREL BISULFATE 75 MG: 75 TABLET ORAL at 12:09

## 2025-05-26 RX ADMIN — POLYETHYLENE GLYCOL 3350 17 G: 17 POWDER, FOR SOLUTION ORAL at 12:10

## 2025-05-26 RX ADMIN — ATORVASTATIN CALCIUM 80 MG: 80 TABLET, FILM COATED ORAL at 12:09

## 2025-05-26 RX ADMIN — ERGOCALCIFEROL 50000 UNITS: 1.25 CAPSULE ORAL at 12:09

## 2025-05-26 RX ADMIN — LEVOTHYROXINE SODIUM 75 MCG: 75 TABLET ORAL at 12:09

## 2025-05-26 RX ADMIN — B-COMPLEX W/ C & FOLIC ACID TAB 1 TABLET: TAB at 12:08

## 2025-05-26 RX ADMIN — EMPAGLIFLOZIN 25 MG: 10 TABLET, FILM COATED ORAL at 12:09

## 2025-05-26 RX ADMIN — LISINOPRIL 30 MG: 20 TABLET ORAL at 12:09

## 2025-05-26 SDOH — ECONOMIC STABILITY: INCOME INSECURITY: HOW HARD IS IT FOR YOU TO PAY FOR THE VERY BASICS LIKE FOOD, HOUSING, MEDICAL CARE, AND HEATING?: NOT HARD AT ALL

## 2025-05-26 SDOH — ECONOMIC STABILITY: FOOD INSECURITY: WITHIN THE PAST 12 MONTHS, YOU WORRIED THAT YOUR FOOD WOULD RUN OUT BEFORE YOU GOT MONEY TO BUY MORE.: NEVER TRUE

## 2025-05-26 SDOH — ECONOMIC STABILITY: INCOME INSECURITY: IN THE PAST 12 MONTHS, HAS THE ELECTRIC, GAS, OIL, OR WATER COMPANY THREATENED TO SHUT OFF SERVICE IN YOUR HOME?: NO

## 2025-05-26 ASSESSMENT — PATIENT HEALTH QUESTIONNAIRE - PHQ9
SUM OF ALL RESPONSES TO PHQ QUESTIONS 1-9: 0
SUM OF ALL RESPONSES TO PHQ QUESTIONS 1-9: 0
1. LITTLE INTEREST OR PLEASURE IN DOING THINGS: NOT AT ALL
SUM OF ALL RESPONSES TO PHQ QUESTIONS 1-9: 0
SUM OF ALL RESPONSES TO PHQ QUESTIONS 1-9: 0
2. FEELING DOWN, DEPRESSED OR HOPELESS: NOT AT ALL

## 2025-05-26 ASSESSMENT — HEART SCORE: ECG: NORMAL

## 2025-05-26 NOTE — PLAN OF CARE
Problem: Chronic Conditions and Co-morbidities  Goal: Patient's chronic conditions and co-morbidity symptoms are monitored and maintained or improved  5/26/2025 1223 by Micheline Grullon RN  Outcome: Adequate for Discharge  5/26/2025 1041 by Micheline Grullon RN  Outcome: Progressing     Problem: Discharge Planning  Goal: Discharge to home or other facility with appropriate resources  5/26/2025 1223 by Micheline Grullon RN  Outcome: Adequate for Discharge  5/26/2025 1041 by Micheline Grullon RN  Outcome: Progressing  Flowsheets (Taken 5/26/2025 0828)  Discharge to home or other facility with appropriate resources:   Identify barriers to discharge with patient and caregiver   Identify discharge learning needs (meds, wound care, etc)     Problem: Safety - Adult  Goal: Free from fall injury  5/26/2025 1223 by Micheline Grullon RN  Outcome: Adequate for Discharge  5/26/2025 1041 by Micheline Grullon RN  Outcome: Progressing     Problem: ABCDS Injury Assessment  Goal: Absence of physical injury  5/26/2025 1223 by Micheline Grullon RN  Outcome: Adequate for Discharge  5/26/2025 1041 by Micheline Grullon RN  Outcome: Progressing

## 2025-05-26 NOTE — ED PROVIDER NOTES
[facility]  EMERGENCY DEPARTMENT ENCOUNTER        Pt Name: Kailee Cotton  MRN: [id number]  Birthdate 1940  Date of evaluation: 5/25/2025  Provider: Jay Villasenor MD  PCP: Xi Silva APRN - NP  Note Started: 12:02 AM EDT 5/26/25    CHIEF COMPLAINT       Chief Complaint   Patient presents with    Chest Pain       HISTORY OF PRESENT ILLNESS: 1 or more Elements     History from : Patient and EMS    Limitations to history : None    Kailee Cotton is a 84 y.o. female who was brought to the emergency department by the EMS for having chest pain and shortness of breath complaint.  Patient states she was having some degree of discomfort since yesterday.  She describes the pain as being localized to the left lower chest wall and left upper quadrant of the abdomen area.  The pain was sharp in nature.  It was at times radiating to the upper part of the chest on the left.  There was no actual exacerbating or relieving factors.  It has been about 4-6 out of 10.  The pain seems to be slightly worse this evening for the last 2 hours.  It was radiating to the right and to the back so she decided to call 911.  Complains of some degree of shortness of breath associated with chest pain.  Denies having any fever or chills.  Denies having any nausea or vomiting.  Denies having any trauma or injury.      Nursing Notes were all reviewed and agreed with or any disagreements were addressed in the HPI.    REVIEW OF SYSTEMS :       systems reviewed and negative except as in HPI/MDM    PAST MEDICAL HISTORY     Past Medical History:   Diagnosis Date    Arthritis     CAD (coronary artery disease)     Chronic kidney disease     ? low functioning per pt    Diabetes mellitus (HCC)     Hyperlipidemia     Hypertension     Thyroid disease        SURGICAL HISTORY     Past Surgical History:   Procedure Laterality Date    CARDIAC SURGERY      3 stents in  June 2018    CAROTID ENDARTERECTOMY Right         Height: 1.549 m (5' 1\")          Patient was given the following medications:  Medications - No data to display         Is this patient to be included in the SEP-1 Core Measure due to severe sepsis or septic shock?   No   Exclusion criteria - the patient is NOT to be included for SEP-1 Core Measure due to:  2+ SIRS criteria are not met    PAST MEDICAL HISTORY      has a past medical history of Arthritis, CAD (coronary artery disease), Chronic kidney disease, Diabetes mellitus (HCC), Hyperlipidemia, Hypertension, and Thyroid disease.     CC/HPI Summary, DDx, ED Course, and Reassessment:   84-year-old female presenting with chest pain and chest discomfort with shortness of breath since yesterday.  Differential diagnosis include:  Acute coronary syndrome  Pulmonary embolism  Costochondritis  Constipation  Pneumonitis       CBC-within normal range  CMP-elevated BUN/creatinine and elevated glucose level  Urinalysis-urinary tract infection with yeast    EKG-  EKG shows normal sinus rhythm with a heart rate of 61 bpm, normal P waves, normal AR interval, narrow QRS complex, normal axis, no acute ST-T changes.    Patient has been stable in the emergency department.  Discussed the labs and the x-ray findings with the patient.  Plan to admit the patient for IV hydration and urinary tract infection and further cardiac evaluation as needed.      CONSULTS: (Who and What was discussed)  0135hr, D/W MAGGIE Morris, regarding the pt, and planned to get the pt admitted to the floor.         Chronic Conditions: Reviewed    Records Reviewed : Outpatient Notes      Disposition Considerations (include 1 Tests not done, Shared Decision Making, Pt Expectation of Test or Tx.):   0135hr, D/W MAGGIE Morris, regarding the pt, and planned to get the pt admitted to the floor.       I am the Primary Clinician of Record.    FINAL IMPRESSION    Chest pain  Urinary tract infection  Yeast infection in urine  Acute on chronic renal

## 2025-05-26 NOTE — FLOWSHEET NOTE
05/26/25 0828   Assessment   Charting Type Shift assessment   Psychosocial   Psychosocial (WDL) WDL   Neurological   Level of Consciousness 0   Neuro (WDL) WDL   [place] Coma Scale   Eye Opening 4   Best Verbal Response 5   Best Motor Response 6   [place] Coma Scale Score 15   HEENT (Head, Ears, Eyes, Nose, & Throat)   HEENT (WDL) X   Right Eye Impaired vision   Left Eye Impaired vision   Teeth Dentures upper;Dentures lower   Respiratory   Respiratory (WDL) WDL   Cardiac   Cardiac (WDL) WDL   Cardiac Monitor   Cardiac/Telemetry Monitor On Portable telemetry pack applied   Telemetry Box Number [id number]   Alarm Audible Yes   Alarms Set Yes   Electrodes Replaced Yes   Telemetry Monitor Alarm Parameters 50/120   Gastrointestinal   Abdominal (WDL) WDL   Abdomen Inspection Soft   Last BM (including prior to admit) 05/25/25   RUQ Bowel Sounds Active   LUQ Bowel Sounds Active   RLQ Bowel Sounds Active   LLQ Bowel Sounds Active   Genitourinary   Genitourinary (WDL) X   Urine Assessment   Urinary Status Voiding   Peripheral Vascular   Peripheral Vascular (WDL) WDL   Edema None   Skin Integumentary    Skin Color Pale   Skin Condition/Temp Dry;Warm   Skin Integrity Ecchymosis   Location scattered   Skin Integumentary (WDL) X   Musculoskeletal   RL Extremity Weakness   LL Extremity Weakness   Musculoskeletal (WDL) X   Care Plan - Discharge Planning Goals   Discharge to home or other facility with appropriate resources Identify barriers to discharge with patient and caregiver;Identify discharge learning needs (meds, wound care, etc)     Pt is alert and oriented.  Pt is resting in bed and appears to have no acute distress. Pt currently on room air. Pt currently on telemetry monitoring showing NSR. Pt's lung sounds are clear/diminished. Pt encouraged to cough and deep breathe. Pt call bell and bedside table within reach.

## 2025-05-26 NOTE — PROGRESS NOTES
Pt is stable and discharged at this time. IV was removed without complication, tip intact.  Pt educated on new medications, next dose time and on pharmacy  or delivery. Pt educated on follow up appointments. Pt verbalized understanding and had no further questions. Pt taken by wheelchair to parking lot and left via private vehicle with family.

## 2025-05-26 NOTE — PROGRESS NOTES
4 Eyes Skin Assessment     NAME:  Kailee Cotton  YOB: 1940  MEDICAL RECORD NUMBER:  [id number]    The patient is being assessed for  Admission    I agree that at least one RN has performed a thorough Head to Toe Skin Assessment on the patient. ALL assessment sites listed below have been assessed.      Areas assessed by both nurses:    Head, Face, Ears, Shoulders, Back, Chest, Arms, Elbows, Hands, Sacrum. Buttock, Coccyx, Ischium, and Legs. Feet and Heels        Does the Patient have a Wound? No noted wound(s)       Deny Prevention initiated by RN: No  Wound Care Orders initiated by RN: No    Pressure Injury (Stage 3,4, Unstageable, DTI, NWPT, and Complex wounds) if present, place Wound referral order by RN under : No    New Ostomies, if present place, Ostomy referral order under : No     Nurse 1 eSignature: Electronically signed by Maya Nunn RN on 5/26/25 at 6:58 AM EDT    **SHARE this note so that the co-signing nurse can place an eSignature**    Nurse 2 eSignature: Electronically signed by Arturo Quiroz RN on 5/26/25 at 7:21 AM EDT

## 2025-05-26 NOTE — PLAN OF CARE
Problem: Chronic Conditions and Co-morbidities  Goal: Patient's chronic conditions and co-morbidity symptoms are monitored and maintained or improved  Outcome: Progressing     Problem: Discharge Planning  Goal: Discharge to home or other facility with appropriate resources  Outcome: Progressing  Flowsheets (Taken 5/26/2025 3575)  Discharge to home or other facility with appropriate resources:   Identify barriers to discharge with patient and caregiver   Identify discharge learning needs (meds, wound care, etc)     Problem: Safety - Adult  Goal: Free from fall injury  Outcome: Progressing     Problem: ABCDS Injury Assessment  Goal: Absence of physical injury  Outcome: Progressing

## 2025-05-26 NOTE — DISCHARGE SUMMARY
Short Stay Summary      Patient ID: Kailee Cotton      Patient's PCP: Xi Silva APRN - NP    Admit Date: 5/25/2025     Discharge Date:   5/26/2025    Admitting Physician: Kenyatta Michelle MD    Discharge Physician: MAGGIE Gregory     Reason for this admission:   Chest pain    Discharge Diagnoses:     Active Hospital Problems    Diagnosis Date Noted    Chest pain [R07.9] 05/26/2025     Priority: High    UTI (urinary tract infection) [N39.0] 05/26/2025    History of heart artery stent [Z95.5] 05/26/2025    CKD (chronic kidney disease) stage 4, GFR 15-29 ml/min (HCC) [N18.4] 05/26/2025    Benign essential HTN [I10] 02/13/2021    Type 2 diabetes mellitus, with long-term current use of insulin (HCC) [E11.9, Z79.4] 02/13/2021       Procedures:  XR CHEST PORTABLE   Final Result      1.  No acute disease.      Electronically signed by Otoniel Cates            Consults:   Case management  PT OT    HISTORY OF PRESENT ILLNESS:   The patient is a 84 y.o. female with PMH of CAD s/p PCI, recurrent UTIs, CKD stage IV, HTN, diabetes mellitus type 2, vitamin D deficiency, secondary hyperparathyroidism, peripheral edema, obesity, anemia and others who presented to the emergency department with complaints of left lower chest pain with radiation into the left upper quadrant of the abdomen.  Pain described as sharp.  Patient denied any exacerbating factors.  She took 2 nitroglycerin with resolution of chest pain; however, pain returned and patient elected to be evaluated in the ER.  She took another nitroglycerin prior to arrival in the emergency department with resolution of chest pain.  Patient routinely follows with cardiologist Dr. Bolaños.  She states she was due a stress test several months ago but it was postponed by Dr. Bolaños's office because of her CKD 4.    Review of system  Constitutional:  Denies fever or chills.   Eyes:  Denies change in visual acuity or discharge.  HENT:  Denies nasal congestion or sore

## 2025-05-26 NOTE — H&P
Short Stay Summary      Patient ID: Kailee Cotton      Patient's PCP: Xi Silva APRN - NP    Admit Date: 5/25/2025     Discharge Date:   5/26/2025    Admitting Physician: Kenyatta Michelle MD    Discharge Physician: MAGGIE Gregory     Reason for this admission:   Chest pain    Discharge Diagnoses:     Active Hospital Problems    Diagnosis Date Noted    Chest pain [R07.9] 05/26/2025     Priority: High    UTI (urinary tract infection) [N39.0] 05/26/2025    History of heart artery stent [Z95.5] 05/26/2025    CKD (chronic kidney disease) stage 4, GFR 15-29 ml/min (HCC) [N18.4] 05/26/2025    Benign essential HTN [I10] 02/13/2021    Type 2 diabetes mellitus, with long-term current use of insulin (HCC) [E11.9, Z79.4] 02/13/2021       Procedures:  XR CHEST PORTABLE   Final Result      1.  No acute disease.      Electronically signed by Otoniel Cates            Consults:   Case management  PT OT    HISTORY OF PRESENT ILLNESS:   The patient is a 84 y.o. female with PMH of CAD s/p PCI, recurrent UTIs, CKD stage IV, HTN, diabetes mellitus type 2, vitamin D deficiency, secondary hyperparathyroidism, peripheral edema, obesity, anemia and others who presented to the emergency department with complaints of left lower chest pain with radiation into the left upper quadrant of the abdomen.  Pain described as sharp.  Patient denied any exacerbating factors.  She took 2 nitroglycerin with resolution of chest pain; however, pain returned and patient elected to be evaluated in the ER.  She took another nitroglycerin prior to arrival in the emergency department with resolution of chest pain.  Patient routinely follows with cardiologist Dr. Bolaños.  She states she was due a stress test several months ago but it was postponed by Dr. Bolaños's office because of her CKD 4.    Review of system  Constitutional:  Denies fever or chills.   Eyes:  Denies change in visual acuity or discharge.  HENT:  Denies nasal congestion or sore  daily   Yes Radha Paniagua MD   doxepin (SINEQUAN) 50 MG capsule Take 1 capsule by mouth nightly   Yes Radha Paniagua MD   levothyroxine (SYNTHROID) 75 MCG tablet Take 1 tablet by mouth Daily   Yes Radha Paniagua MD   aspirin 81 MG EC tablet Take 1 tablet by mouth daily   Yes Radha Paniagua MD   oxyCODONE-acetaminophen (PERCOCET) 7.5-325 MG per tablet Take 1 tablet by mouth every 8 hours as needed.   Yes Radha Paniagua MD   vitamin D (CHOLECALCIFEROL) 50 MCG (2000 UT) CAPS capsule     Radha Paniagua MD   vitamin D (ERGOCALCIFEROL) 1.25 MG (86682 UT) CAPS capsule Take 1 capsule by mouth once a week 3/15/25   Maya Hardin PA   cyanocobalamin 1000 MCG/ML injection Inject 1 mL into the muscle every 7 days 3/15/25 4/14/25  Maya Hardin PA       Vital Signs  Temp: 97.9 °F (36.6 °C)  Pulse: 68  Respirations: 18  BP: (!) 148/53  SpO2: 99 %  O2 Device: None (Room air)       Vital signs reviewed in electronic chart.    Physical exam  Constitutional:  Well developed, well nourished, obese, elderly female lying in bed in no acute distress.  Eyes:  PERRL, conjunctiva normal, EOMI.  HENT:  Atraumatic, external ears normal, external nose/nares normal, oropharynx moist, no pharyngeal exudates.   Neck:  Supple. No JVD or thyromegaly.  Respiratory:  No respiratory distress, normal breath sounds, no rales, no wheezing.   Cardiovascular:  Normal rate, normal rhythm, no murmurs, no gallops, no rubs.  GI:  Soft, nondistended, normal bowel sounds, nontender, no organomegaly, no mass.  :  No costovertebral angle tenderness.  Musculoskeletal:  No edema, no tenderness, no obvious deformities. Patient is moving all extremities.   Integument:  Well hydrated, no rash.   Lymphatic:  No cervical or axillary lymphadenopathy noted.   Neurologic:  Alert & oriented x 3,  no focal deficits noted. Strength is equal throughout.  Psychiatric:  Speech and behavior appropriate.      Lab Results   Component

## 2025-05-26 NOTE — ED TRIAGE NOTES
PT has a cardiac hx, pt comes in tonight complaining of chest pain that radiates to her back. Pt states that it sharp and pain is intermittently. PT takes an ASA daily.   EMS est a 20GLAC and gave pt 324 ASA enroute.

## 2025-05-27 NOTE — CARE COORDINATION
Patient states she is feeling better. She said her medicines were explained prior to discharge and she has everything she needs to stay at her home. She has a follow-up with CORAZON Nicolas on  6/5/2025 at 11:30 AM and Dr. Bolaños 5/28/2025 at 10:30 AM. She had no questions at the time.

## 2025-05-28 LAB
BACTERIA UR CULT: ABNORMAL
ORGANISM: ABNORMAL

## 2025-05-30 LAB
BACTERIA BLD CULT ORG #2: NORMAL
BACTERIA BLD CULT: NORMAL

## 2025-06-25 ENCOUNTER — HOSPITAL ENCOUNTER (OUTPATIENT)
Dept: ULTRASOUND IMAGING | Facility: HOSPITAL | Age: 85
Discharge: HOME | End: 2025-06-25
Payer: MEDICARE

## 2025-06-25 DIAGNOSIS — N39.0 RECURRENT URINARY TRACT INFECTION: ICD-10-CM

## 2025-06-25 PROCEDURE — 76775 US EXAM ABDO BACK WALL LIM: CPT | Performed by: NURSE PRACTITIONER

## 2025-07-15 ENCOUNTER — HOSPITAL ENCOUNTER (OUTPATIENT)
Facility: HOSPITAL | Age: 85
Discharge: HOME | End: 2025-07-15
Payer: MEDICARE

## 2025-07-15 LAB
25(OH)D3 SERPL-MCNC: 22.6 NG/ML (ref 32–100)
ALBUMIN SERPL-MCNC: 3.8 G/DL (ref 3.4–4.8)
ANION GAP SERPL CALCULATED.3IONS-SCNC: 13 MMOL/L (ref 3–16)
BACTERIA URNS QL MICRO: ABNORMAL /HPF
BILIRUB UR QL STRIP.AUTO: NEGATIVE
BUN SERPL-MCNC: 55 MG/DL (ref 6–20)
CALCIUM SERPL-MCNC: 9.2 MG/DL (ref 8.3–10.6)
CHLORIDE SERPL-SCNC: 104 MMOL/L (ref 98–107)
CLARITY UR: CLEAR
CO2 SERPL-SCNC: 24 MMOL/L (ref 20–30)
COLOR UR: YELLOW
CREAT SERPL-MCNC: 1.8 MG/DL (ref 0.6–1.2)
EPI CELLS #/AREA URNS HPF: ABNORMAL /HPF (ref 0–5)
ERYTHROCYTE [DISTWIDTH] IN BLOOD BY AUTOMATED COUNT: 13.2 % (ref 11–16)
FERRITIN SERPL IA-MCNC: 465 NG/ML (ref 22–322)
GFR SERPLBLD CREATININE-BSD FMLA CKD-EPI: 27 ML/MIN/{1.73_M2}
GLUCOSE SERPL-MCNC: 153 MG/DL (ref 74–106)
GLUCOSE UR STRIP.AUTO-MCNC: NEGATIVE MG/DL
HCT VFR BLD AUTO: 32.4 % (ref 37–47)
HGB BLD-MCNC: 10.3 G/DL (ref 11.5–16.5)
HGB UR QL STRIP.AUTO: ABNORMAL
IRON SATN MFR SERPL: 39 % (ref 15–50)
IRON SERPL-MCNC: 133 UG/DL (ref 37–145)
KETONES UR STRIP.AUTO-MCNC: NEGATIVE MG/DL
LEUKOCYTE ESTERASE UR QL STRIP.AUTO: ABNORMAL
MCH RBC QN AUTO: 30.9 PG (ref 27–32)
MCHC RBC AUTO-ENTMCNC: 31.8 G/DL (ref 31–35)
MCV RBC AUTO: 97.3 FL (ref 80–100)
NITRITE UR QL STRIP.AUTO: NEGATIVE
PH UR STRIP.AUTO: 5.5 [PH] (ref 5–8)
PHOSPHATE SERPL-MCNC: 3.8 MG/DL (ref 2.5–4.5)
PLATELET # BLD AUTO: 313 K/UL (ref 150–400)
PMV BLD AUTO: 10.7 FL (ref 6–10)
POTASSIUM SERPL-SCNC: 4.6 MMOL/L (ref 3.4–5.1)
PROT UR STRIP.AUTO-MCNC: ABNORMAL MG/DL
PTH-INTACT SERPL-MCNC: 68 PG/ML (ref 14–72)
RBC # BLD AUTO: 3.33 M/UL (ref 3.8–5.8)
RBC #/AREA URNS HPF: ABNORMAL /HPF (ref 0–4)
SODIUM SERPL-SCNC: 141 MMOL/L (ref 136–145)
SP GR UR STRIP.AUTO: 1.02 (ref 1–1.03)
TIBC SERPL-MCNC: 344 UG/DL (ref 250–450)
UA COMPLETE W REFLEX CULTURE PNL UR: YES
UA DIPSTICK W REFLEX MICRO PNL UR: YES
URATE SERPL-MCNC: 6.6 MG/DL (ref 2.5–7.1)
URN SPEC COLLECT METH UR: ABNORMAL
UROBILINOGEN UR STRIP-ACNC: 1 E.U./DL
WBC # BLD AUTO: 7.2 K/UL (ref 4–11)
WBC #/AREA URNS HPF: >100 /HPF (ref 0–5)

## 2025-07-15 PROCEDURE — 83540 ASSAY OF IRON: CPT

## 2025-07-15 PROCEDURE — 84550 ASSAY OF BLOOD/URIC ACID: CPT

## 2025-07-15 PROCEDURE — 87086 URINE CULTURE/COLONY COUNT: CPT

## 2025-07-15 PROCEDURE — 81001 URINALYSIS AUTO W/SCOPE: CPT

## 2025-07-15 PROCEDURE — 36415 COLL VENOUS BLD VENIPUNCTURE: CPT

## 2025-07-15 PROCEDURE — 82728 ASSAY OF FERRITIN: CPT

## 2025-07-15 PROCEDURE — 82306 VITAMIN D 25 HYDROXY: CPT

## 2025-07-15 PROCEDURE — 83970 ASSAY OF PARATHORMONE: CPT

## 2025-07-15 PROCEDURE — 85027 COMPLETE CBC AUTOMATED: CPT

## 2025-07-15 PROCEDURE — 80069 RENAL FUNCTION PANEL: CPT

## 2025-07-15 PROCEDURE — 83550 IRON BINDING TEST: CPT

## 2025-07-16 LAB — BACTERIA UR CULT: NORMAL

## (undated) DEVICE — MARKR UTIL W/RULR W/LBL REGTP STRL

## (undated) DEVICE — FLEXIBLE YANKAUER,MEDIUM TIP, NO VACUUM CONTROL: Brand: ARGYLE

## (undated) DEVICE — NDL ART WING 18G 7CM

## (undated) DEVICE — Device

## (undated) DEVICE — TP UMB COTN 30IN U11T

## (undated) DEVICE — DBD-PACK,EENT,SIRUS,PK II: Brand: MEDLINE

## (undated) DEVICE — ANGIO-SEAL VIP VASCULAR CLOSURE DEVICE: Brand: ANGIO-SEAL

## (undated) DEVICE — PENCL E/S HNDSWCH PUSHBTN HOLSTR 10FT

## (undated) DEVICE — PINNACLE INTRODUCER SHEATH: Brand: PINNACLE

## (undated) DEVICE — DRSNG WND GZ PAD BORDERED LF 4X5IN STRL

## (undated) DEVICE — GLV SURG TRIUMPH MICRO PF LTX 7.5 STRL

## (undated) DEVICE — TUBING, SUCTION, 1/4" X 12', STRAIGHT: Brand: MEDLINE

## (undated) DEVICE — GW COUGAR XT HYDROTRACK STR TP 190CM

## (undated) DEVICE — STRIP,CLOSURE,WOUND,MEDI-STRIP,1/2X4: Brand: MEDLINE

## (undated) DEVICE — ELECTRD PAD DEFIB A/

## (undated) DEVICE — SUT PROLENE 6-0 VV1 18IN 8806H

## (undated) DEVICE — CUFF SCD HEMOFORCE SEQ CALF STD MD

## (undated) DEVICE — GOWN,SIRUS,NON REINFRCD,LARGE,SET IN SL: Brand: MEDLINE

## (undated) DEVICE — SUT SILK PERMA HAND 4/0 12-30IN BRAID BLK A303H

## (undated) DEVICE — NAVVUS CATHETER, P/N 701481-002: Brand: ACIST NAVVUS® CATHETER

## (undated) DEVICE — SUT PROLN SURGILENE 5.0 24IN BLU 9702H

## (undated) DEVICE — UNDYED BRAIDED (POLYGLACTIN 910), SYNTHETIC ABSORBABLE SUTURE: Brand: COATED VICRYL

## (undated) DEVICE — DECANT BG O JET

## (undated) DEVICE — CATH URETRL AP 18F

## (undated) DEVICE — PTFE COATED BLADE 2.5': Brand: MEDLINE

## (undated) DEVICE — CATH F6 ST JL 4 100CM: Brand: SUPERTORQUE

## (undated) DEVICE — CVR HNDL LIGHT RIGID

## (undated) DEVICE — NDL HYPO ECLPS SFTY 25G 1 1/2IN

## (undated) DEVICE — NC TREK CORONARY DILATATION CATHETER 3.0 MM X 15 MM / RAPID-EXCHANGE: Brand: NC TREK

## (undated) DEVICE — GLIDESHEATH ACCESS KIT HYDROPHILIC COATED INTRODUCER SHEATH: Brand: GLIDESHEATH

## (undated) DEVICE — GAUZE,SPONGE,4"X4",16PLY,XRAY,STRL,LF: Brand: MEDLINE

## (undated) DEVICE — INSTRUMENT PAD: Brand: DEROYAL

## (undated) DEVICE — SYR LL TP 10ML STRL

## (undated) DEVICE — NDL HYPO REG/BVL 30G 1IN LF

## (undated) DEVICE — IRRIGATOR BULB ASEPTO 60CC STRL

## (undated) DEVICE — GW EMR FIX EXCHG J STD .035 3MM 260CM

## (undated) DEVICE — TREK CORONARY DILATATION CATHETER 2.75 MM X 15 MM / RAPID-EXCHANGE: Brand: TREK

## (undated) DEVICE — RADIFOCUS OPTITORQUE ANGIOGRAPHIC CATHETER: Brand: OPTITORQUE

## (undated) DEVICE — ANGIOGRAPHIC CATHETER: Brand: EXPO™

## (undated) DEVICE — SYR LL 3CC

## (undated) DEVICE — ST INF PRI SMRTSTE 20DRP 2VLV 24ML 117

## (undated) DEVICE — INFLATION DEVICE: Brand: ENCORE™ 26

## (undated) DEVICE — SYR LUERLOK 20CC

## (undated) DEVICE — COUNT NDL FOAM STRIP W/MAG 60CT

## (undated) DEVICE — SUT SILK 2/0 SUTUPAK TIES 24IN SA75H

## (undated) DEVICE — 1000 S-DRAPE TOWEL DRAPE 10/BX: Brand: STERI-DRAPE™

## (undated) DEVICE — TOWEL,OR,DSP,ST,BLUE,STD,4/PK,20PK/CS: Brand: MEDLINE

## (undated) DEVICE — GOWN,PREVENTION PLUS,XLARGE,STERILE: Brand: MEDLINE

## (undated) DEVICE — STERILE COTTON BALLS LARGE 5/P: Brand: MEDLINE

## (undated) DEVICE — SUTURE BOOT: Brand: DEROYAL

## (undated) DEVICE — INTENDED FOR TISSUE SEPARATION, AND OTHER PROCEDURES THAT REQUIRE A SHARP SURGICAL BLADE TO PUNCTURE OR CUT.: Brand: BARD-PARKER ® CARBON RIB-BACK BLADES

## (undated) DEVICE — VESSEL LOOPS X-RAY DETECTABLE: Brand: DEROYAL

## (undated) DEVICE — TOTAL TRAY, 16FR 10ML SIL FOLEY, URN: Brand: MEDLINE

## (undated) DEVICE — SPNG GZ WOVN 4X4IN 12PLY 10/BX STRL

## (undated) DEVICE — CATH F6 ST JR 4 100CM: Brand: SUPERTORQUE

## (undated) DEVICE — SPNG LAP 18X18IN LF STRL PK/5

## (undated) DEVICE — SUT VIC 3/0 SH 27IN J416H

## (undated) DEVICE — GUIDE CATHETER: Brand: RUNWAY™

## (undated) DEVICE — SHEET,DRAPE,70X100,STERILE: Brand: MEDLINE

## (undated) DEVICE — NC TREK CORONARY DILATATION CATHETER 2.5 MM X 15 MM / RAPID-EXCHANGE: Brand: NC TREK

## (undated) DEVICE — GUIDE CATHETER: Brand: MACH1™

## (undated) DEVICE — TR BAND RADIAL ARTERY COMPRESSION DEVICE: Brand: TR BAND